# Patient Record
Sex: FEMALE | Race: WHITE | Employment: FULL TIME | URBAN - METROPOLITAN AREA
[De-identification: names, ages, dates, MRNs, and addresses within clinical notes are randomized per-mention and may not be internally consistent; named-entity substitution may affect disease eponyms.]

---

## 2017-05-03 PROBLEM — E78.5 DYSLIPIDEMIA: Status: ACTIVE | Noted: 2017-05-03

## 2017-10-28 ENCOUNTER — HOSPITAL ENCOUNTER (INPATIENT)
Age: 52
LOS: 3 days | Discharge: HOME | DRG: 176 | End: 2017-10-31
Attending: HOSPITALIST | Admitting: INTERNAL MEDICINE
Payer: COMMERCIAL

## 2017-10-28 PROBLEM — I26.99 ACUTE PULMONARY EMBOLISM (HCC): Status: ACTIVE | Noted: 2017-10-28

## 2017-10-28 PROBLEM — N80.9 ENDOMETRIOSIS: Chronic | Status: ACTIVE | Noted: 2017-10-28

## 2017-10-28 LAB
ALBUMIN SERPL-MCNC: 3.3 G/DL (ref 3.5–5)
ALBUMIN/GLOB SERPL: 0.8 {RATIO} (ref 1.2–3.5)
ALP SERPL-CCNC: 88 U/L (ref 50–136)
ALT SERPL-CCNC: 30 U/L (ref 12–65)
ANION GAP SERPL CALC-SCNC: 10 MMOL/L (ref 7–16)
APTT PPP: 29.4 SEC (ref 23.5–31.7)
ARTERIAL PATENCY WRIST A: POSITIVE
AST SERPL-CCNC: 22 U/L (ref 15–37)
BASE EXCESS BLDA CALC-SCNC: 2.5 MMOL/L (ref 0–3)
BASOPHILS # BLD: 0 K/UL (ref 0–0.2)
BASOPHILS NFR BLD: 0 % (ref 0–2)
BDY SITE: ABNORMAL
BILIRUB SERPL-MCNC: 0.3 MG/DL (ref 0.2–1.1)
BUN SERPL-MCNC: 9 MG/DL (ref 6–23)
CALCIUM SERPL-MCNC: 8.8 MG/DL (ref 8.3–10.4)
CHLORIDE SERPL-SCNC: 104 MMOL/L (ref 98–107)
CO2 SERPL-SCNC: 27 MMOL/L (ref 21–32)
COHGB MFR BLD: 0.9 % (ref 0.5–1.5)
CREAT SERPL-MCNC: 0.77 MG/DL (ref 0.6–1)
DIFFERENTIAL METHOD BLD: ABNORMAL
DO-HGB BLD-MCNC: 7 % (ref 0–5)
EOSINOPHIL # BLD: 0.1 K/UL (ref 0–0.8)
EOSINOPHIL NFR BLD: 1 % (ref 0.5–7.8)
ERYTHROCYTE [DISTWIDTH] IN BLOOD BY AUTOMATED COUNT: 13.1 % (ref 11.9–14.6)
GLOBULIN SER CALC-MCNC: 4.3 G/DL (ref 2.3–3.5)
GLUCOSE SERPL-MCNC: 124 MG/DL (ref 65–100)
HCO3 BLDA-SCNC: 24 MMOL/L (ref 22–26)
HCT VFR BLD AUTO: 38.4 % (ref 35.8–46.3)
HGB BLD-MCNC: 13.4 G/DL (ref 11.7–15.4)
HGB BLDMV-MCNC: 13.3 GM/DL (ref 11.7–15)
IMM GRANULOCYTES # BLD: 0 K/UL (ref 0–0.5)
IMM GRANULOCYTES NFR BLD: 0 % (ref 0–5)
INR PPP: 1 (ref 0.9–1.2)
LYMPHOCYTES # BLD: 3.5 K/UL (ref 0.5–4.6)
LYMPHOCYTES NFR BLD: 32 % (ref 13–44)
MCH RBC QN AUTO: 31 PG (ref 26.1–32.9)
MCHC RBC AUTO-ENTMCNC: 34.9 G/DL (ref 31.4–35)
MCV RBC AUTO: 88.9 FL (ref 79.6–97.8)
METHGB MFR BLD: 0 % (ref 0–1.5)
MONOCYTES # BLD: 0.8 K/UL (ref 0.1–1.3)
MONOCYTES NFR BLD: 7 % (ref 4–12)
NEUTS SEG # BLD: 6.4 K/UL (ref 1.7–8.2)
NEUTS SEG NFR BLD: 60 % (ref 43–78)
OXYHGB MFR BLDA: 92.4 % (ref 94–97)
PCO2 BLDA: 29 MMHG (ref 35–45)
PH BLDA: 7.54 [PH] (ref 7.35–7.45)
PLATELET # BLD AUTO: 292 K/UL (ref 150–450)
PMV BLD AUTO: 9.8 FL (ref 10.8–14.1)
PO2 BLDA: 59 MMHG (ref 80–105)
POTASSIUM SERPL-SCNC: 3.1 MMOL/L (ref 3.5–5.1)
PROT SERPL-MCNC: 7.6 G/DL (ref 6.3–8.2)
PROTHROMBIN TIME: 11 SEC (ref 9.6–12)
RBC # BLD AUTO: 4.32 M/UL (ref 4.05–5.25)
SAO2 % BLD: 93 % (ref 92–98.5)
SODIUM SERPL-SCNC: 141 MMOL/L (ref 136–145)
VENTILATION MODE VENT: ABNORMAL
WBC # BLD AUTO: 10.7 K/UL (ref 4.3–11.1)

## 2017-10-28 PROCEDURE — 74011250637 HC RX REV CODE- 250/637: Performed by: INTERNAL MEDICINE

## 2017-10-28 PROCEDURE — 36415 COLL VENOUS BLD VENIPUNCTURE: CPT | Performed by: INTERNAL MEDICINE

## 2017-10-28 PROCEDURE — 80053 COMPREHEN METABOLIC PANEL: CPT | Performed by: INTERNAL MEDICINE

## 2017-10-28 PROCEDURE — 74011636637 HC RX REV CODE- 636/637: Performed by: INTERNAL MEDICINE

## 2017-10-28 PROCEDURE — 85025 COMPLETE CBC W/AUTO DIFF WBC: CPT | Performed by: INTERNAL MEDICINE

## 2017-10-28 PROCEDURE — 82803 BLOOD GASES ANY COMBINATION: CPT

## 2017-10-28 PROCEDURE — 99218 HC RM OBSERVATION: CPT

## 2017-10-28 PROCEDURE — 74011000250 HC RX REV CODE- 250: Performed by: INTERNAL MEDICINE

## 2017-10-28 PROCEDURE — 36600 WITHDRAWAL OF ARTERIAL BLOOD: CPT

## 2017-10-28 PROCEDURE — 94640 AIRWAY INHALATION TREATMENT: CPT

## 2017-10-28 PROCEDURE — 85730 THROMBOPLASTIN TIME PARTIAL: CPT | Performed by: INTERNAL MEDICINE

## 2017-10-28 PROCEDURE — 85610 PROTHROMBIN TIME: CPT | Performed by: INTERNAL MEDICINE

## 2017-10-28 PROCEDURE — 93005 ELECTROCARDIOGRAM TRACING: CPT | Performed by: INTERNAL MEDICINE

## 2017-10-28 PROCEDURE — 65270000029 HC RM PRIVATE

## 2017-10-28 RX ORDER — SODIUM CHLORIDE 0.9 % (FLUSH) 0.9 %
5-10 SYRINGE (ML) INJECTION AS NEEDED
Status: DISCONTINUED | OUTPATIENT
Start: 2017-10-28 | End: 2017-10-31 | Stop reason: HOSPADM

## 2017-10-28 RX ORDER — PREDNISONE 20 MG/1
20 TABLET ORAL
Status: DISCONTINUED | OUTPATIENT
Start: 2017-10-29 | End: 2017-10-29

## 2017-10-28 RX ORDER — MONTELUKAST SODIUM 10 MG/1
10 TABLET ORAL
Status: DISCONTINUED | OUTPATIENT
Start: 2017-10-28 | End: 2017-10-31 | Stop reason: HOSPADM

## 2017-10-28 RX ORDER — PREDNISONE 10 MG/1
10 TABLET ORAL
Status: DISCONTINUED | OUTPATIENT
Start: 2017-10-29 | End: 2017-10-29

## 2017-10-28 RX ORDER — PREDNISONE 20 MG/1
20 TABLET ORAL
Status: DISPENSED | OUTPATIENT
Start: 2017-10-28 | End: 2017-10-29

## 2017-10-28 RX ORDER — BACLOFEN 10 MG/1
10 TABLET ORAL 3 TIMES DAILY
Status: DISCONTINUED | OUTPATIENT
Start: 2017-10-28 | End: 2017-10-31 | Stop reason: HOSPADM

## 2017-10-28 RX ORDER — ONDANSETRON 4 MG/1
8 TABLET, ORALLY DISINTEGRATING ORAL
Status: DISCONTINUED | OUTPATIENT
Start: 2017-10-28 | End: 2017-10-31 | Stop reason: HOSPADM

## 2017-10-28 RX ORDER — PREDNISONE 10 MG/1
10 TABLET ORAL
Status: COMPLETED | OUTPATIENT
Start: 2017-10-28 | End: 2017-10-28

## 2017-10-28 RX ORDER — BUDESONIDE 0.5 MG/2ML
500 INHALANT ORAL
Status: DISCONTINUED | OUTPATIENT
Start: 2017-10-28 | End: 2017-10-29

## 2017-10-28 RX ORDER — PREDNISONE 20 MG/1
20 TABLET ORAL
Status: COMPLETED | OUTPATIENT
Start: 2017-10-28 | End: 2017-10-28

## 2017-10-28 RX ORDER — FLUTICASONE PROPIONATE 50 MCG
2 SPRAY, SUSPENSION (ML) NASAL DAILY
Status: DISCONTINUED | OUTPATIENT
Start: 2017-10-29 | End: 2017-10-29 | Stop reason: SDUPTHER

## 2017-10-28 RX ORDER — ESCITALOPRAM OXALATE 10 MG/1
10 TABLET ORAL DAILY
Status: DISCONTINUED | OUTPATIENT
Start: 2017-10-29 | End: 2017-10-31 | Stop reason: HOSPADM

## 2017-10-28 RX ORDER — PREDNISONE 10 MG/1
10 TABLET ORAL
Status: DISCONTINUED | OUTPATIENT
Start: 2017-11-01 | End: 2017-10-29

## 2017-10-28 RX ORDER — NAPROXEN 250 MG/1
500 TABLET ORAL 2 TIMES DAILY WITH MEALS
Status: DISCONTINUED | OUTPATIENT
Start: 2017-10-28 | End: 2017-10-29

## 2017-10-28 RX ORDER — PREDNISONE 10 MG/1
10 TABLET ORAL
Status: DISCONTINUED | OUTPATIENT
Start: 2017-11-05 | End: 2017-10-29

## 2017-10-28 RX ORDER — ALBUTEROL SULFATE 90 UG/1
2 AEROSOL, METERED RESPIRATORY (INHALATION)
Status: DISCONTINUED | OUTPATIENT
Start: 2017-10-28 | End: 2017-10-29

## 2017-10-28 RX ORDER — SODIUM CHLORIDE 0.9 % (FLUSH) 0.9 %
5-10 SYRINGE (ML) INJECTION EVERY 8 HOURS
Status: DISCONTINUED | OUTPATIENT
Start: 2017-10-28 | End: 2017-10-31 | Stop reason: HOSPADM

## 2017-10-28 RX ORDER — LORATADINE 10 MG/1
10 TABLET ORAL DAILY
Status: DISCONTINUED | OUTPATIENT
Start: 2017-10-29 | End: 2017-10-29

## 2017-10-28 RX ADMIN — BACLOFEN 10 MG: 10 TABLET ORAL at 19:02

## 2017-10-28 RX ADMIN — Medication 5 ML: at 21:18

## 2017-10-28 RX ADMIN — PREDNISONE 10 MG: 10 TABLET ORAL at 19:03

## 2017-10-28 RX ADMIN — NAPROXEN 500 MG: 250 TABLET ORAL at 19:01

## 2017-10-28 RX ADMIN — BUDESONIDE 500 MCG: 0.5 INHALANT RESPIRATORY (INHALATION) at 19:55

## 2017-10-28 RX ADMIN — BACLOFEN 10 MG: 10 TABLET ORAL at 21:18

## 2017-10-28 RX ADMIN — PREDNISONE 20 MG: 20 TABLET ORAL at 19:15

## 2017-10-28 RX ADMIN — MONTELUKAST SODIUM 10 MG: 10 TABLET, FILM COATED ORAL at 21:18

## 2017-10-28 RX ADMIN — PREDNISONE 20 MG: 20 TABLET ORAL at 19:02

## 2017-10-28 RX ADMIN — APIXABAN 10 MG: 5 TABLET, FILM COATED ORAL at 19:02

## 2017-10-28 RX ADMIN — Medication 10 ML: at 19:04

## 2017-10-28 NOTE — PROGRESS NOTES
Reviewed notes of patient for spiritual concerns. Staff caring for patient. Family present.     Luis Craig,  Staff   C: [phone] /  [email or web address]

## 2017-10-28 NOTE — IP AVS SNAPSHOT
[facility] 
 
 
 [address] [facility] [address] [facility] 
[phone] Patient: Quinton Jones MRN: [id number] :1965 About your hospitalization You were admitted on:  2017 You last received care in the:  [facility] 8 MED SURG You were discharged on:  2017 Why you were hospitalized Your primary diagnosis was:  Not on File Your diagnoses also included:  Acute Pulmonary Embolism (Hcc), Dyslipidemia, Htn (Hypertension), Endometriosis Things You Need To Do (next 8 weeks) Follow up with Halley Javed MD  
[facility] to see to if appt time was available. Phone:  [phone] Where:  [address] [address] [address] Follow up with [facility] Message left they will call patient back with new patient appointment Phone:  [phone] Where:  [address] [address]  Extended Office Visit with Celena Vargas MD at  2:15 PM  
Where:  [address] [facility] ([facility])  New Patient with Halley Javed MD at  9:00 AM  
Where: [facility]) Discharge Orders None A check vasquez indicates which time of day the medication should be taken. My Medications STOP taking these medications   
 azilsartan med-chlorthalidone 40-12.5 mg Tab Commonly known as:  EDARBYCLOR  
   
  
 loratadine 10 mg tablet Commonly known as:  CLARITIN  
   
  
 MICROGESTIN FE  (28) 1 mg-20 mcg (21)/75 mg (7) Tab Generic drug:  norethindrone-ethinyl estradiol  
   
  
 naproxen 500 mg tablet Commonly known as:  NAPROSYN  
   
  
 norethindrone-ethinyl estradiol 1-20 mg-mcg Tab Commonly known as:  MICROGESTIN /20 TAKE these medications as instructed Instructions Each Dose to Equal  
 Morning Noon Evening Bedtime  
 albuterol 90 mcg/actuation inhaler Commonly known as:  PROVENTIL HFA, VENTOLIN HFA, PROAIR HFA Your next dose is: Take on as needed schedule Take 2 Puffs by inhalation every six (6) hours as needed for Wheezing. 2 Puff * apixaban 5 mg tablet Commonly known as:  Blaine Francesco Your next dose is: This evening Take 2 Tabs by mouth two (2) times a day for 8 doses. On 11/4/17 take 10 mg in the morning, switch to 5 mg in the evening 10 mg  
    
  
   
   
  
   
  
 * apixaban 5 mg tablet Commonly known as:  Blainedemar Maya Start taking on:  11/4/2017 Take 1 Tab by mouth two (2) times a day. Start using this dose on 11/4/17 at evening 5 mg  
    
   
   
   
  
 atenolol 50 mg tablet Commonly known as:  TENORMIN Your next dose is:  Tomorrow Morning Take 1 Tab by mouth daily. 50 mg  
    
  
   
   
   
  
 baclofen 10 mg tablet Commonly known as:  LIORESAL  
   
 1-2 po TID prn muscle spasm - causes sleepiness  
     
   
   
   
  
 escitalopram oxalate 10 mg tablet Commonly known as:  Ronnie Diamond Your next dose is:  Tomorrow Morning 
  
   
 take 1 tablet by mouth once daily  
     
  
   
   
   
  
 fexofenadine 180 mg tablet Commonly known as:  [place] Kingman Your next dose is:  Tomorrow Morning Take 1 Tab by mouth daily. 180 mg  
    
  
   
   
   
  
 fluticasone 50 mcg/actuation nasal spray Commonly known as:  Maryann Ferguson Your next dose is:  Tomorrow Morning 2 Sprays by Both Nostrils route daily. 2 Spray  
    
  
   
   
   
  
 losartan 25 mg tablet Commonly known as:  COZAAR Your next dose is:  Tomorrow Morning Take 1 Tab by mouth daily. 25 mg  
    
  
   
   
   
  
 montelukast 10 mg tablet Commonly known as:  SINGULAIR Your next dose is:  Tomorrow Morning 1 po qd for allergies and asthma ondansetron hcl 8 mg tablet Commonly known as:  ZOFRAN (AS HYDROCHLORIDE) Take 8 mg by mouth every eight (8) hours as needed for Nausea. raNITIdine 300 mg tablet Commonly known as:  ZANTAC Your next dose is: This evening 1 po qhs - bid * Notice: This list has 2 medication(s) that are the same as other medications prescribed for you. Read the directions carefully, and ask your doctor or other care provider to review them with you. Where to Get Your Medications Information on where to get these meds will be given to you by the nurse or doctor. ! Ask your nurse or doctor about these medications  
  apixaban 5 mg tablet  
 apixaban 5 mg tablet  
 atenolol 50 mg tablet  
 losartan 25 mg tablet Discharge Instructions Pulmonary Embolism: Care Instructions Your Care Instructions Pulmonary embolism is the sudden blockage of an artery in the lung. Blood clots in the deep veins of the leg or pelvis (deep vein thrombosis, or DVT) are the most common cause. These blood clots can travel to the lungs. Pulmonary embolism can be very serious. Because you have had one pulmonary embolism, you are at greater risk for having another one. But you can take steps to prevent another pulmonary embolism by following your doctor's instructions. You will probably take a prescription blood-thinning medicine to prevent blood clots. A blood thinner can stop a blood clot from growing larger and prevent new clots from forming. Follow-up care is a key part of your treatment and safety. Be sure to make and go to all appointments, and call your doctor if you are having problems. It's also a good idea to know your test results and keep a list of the medicines you take. How can you care for yourself at home? · Take your medicines exactly as prescribed.  Call your doctor if you think you are having a problem with your medicine. You will get more details on the specific medicines your doctor prescribes. · If you are taking a blood thinner, be sure you get instructions about how to take your medicine safely. Blood thinners can cause serious bleeding problems. Preventing future pulmonary embolisms · Exercise. Keep blood moving in your legs to keep clots from forming. If you are traveling by car, stop every hour or so. Get out and walk around for a few minutes. If you are traveling by bus, train, or plane, get out of your seat and walk up and down the aisles every hour or so. You also can do leg exercises while you are seated. Pump your feet up and down by pulling your toes up toward your knees then pointing them down. · Get up out of bed as soon as possible after an illness or surgery. · Do not smoke. If you need help quitting, talk to your doctor about stop-smoking programs and medicines. These can increase your chances of quitting for good. · Check with your doctor before taking hormone or birth control pills. These may increase your risk of blot clots. · Ask your doctor about wearing compression stockings to help prevent blood clots in your legs. There are different types of stockings, and they need to fit right. So your doctor will recommend what you need. When should you call for help? Call 911 anytime you think you may need emergency care. For example, call if: 
? · You have shortness of breath. ? · You have chest pain. ? · You passed out (lost consciousness). ? · You cough up blood. ?Call your doctor now or seek immediate medical care if: 
? · You have new or worsening pain or swelling in your leg. ? Watch closely for changes in your health, and be sure to contact your doctor if: 
? · You do not get better as expected. Where can you learn more? Go to http://jack-khloe.info/.  
Enter P396 in the search box to learn more about \"Pulmonary Embolism: Care Instructions. \" Current as of: March 20, 2017 Content Version: 11.4 © 2594-9608 [employer]. Care instructions adapted under license by [employer] (which disclaims liability or warranty for this information). If you have questions about a medical condition or this instruction, always ask your healthcare professional. [address] any warranty or liability for your use of this information. Apixaban (By mouth) Apixaban (a-PIX-a-ban) Treats and prevents blood clots. This medicine is a blood thinner. Brand Name(s): Eliquis There may be other brand names for this medicine. When This Medicine Should Not Be Used: This medicine is not right for everyone. Do not use it if you had an allergic reaction to apixaban or you have active bleeding. How to Use This Medicine:  
Tablet · Your doctor will tell you how much medicine to use. Do not use more than directed. · If you are not able to swallow the tablets whole, they may be crushed and mixed in water, 5% dextrose in water (D5W), apple juice, or applesauce. The crushed tablets may be mixed with 60 mL of water or D5W dose and given through a nasogastric tube (NGT). · This medicine should come with a Medication Guide. Ask your pharmacist for a copy if you do not have one. · Missed dose: Take a dose as soon as you remember. If it is almost time for your next dose, wait until then and take a regular dose. Do not take extra medicine to make up for a missed dose. · Store the medicine in a closed container at room temperature, away from heat, moisture, and direct light. Drugs and Foods to Avoid: Ask your doctor or pharmacist before using any other medicine, including over-the-counter medicines, vitamins, and herbal products. · Some medicines can affect how apixaban works. Tell your doctor if you are using any of the following: ¨ Carbamazepine, clarithromycin, itraconazole, ketoconazole, phenytoin, rifampin, ritonavir, Michel's wort ¨ Blood thinner (including clopidogrel, heparin, prasugrel, warfarin) ¨ Medicine to treat depression ¨ NSAID pain or arthritis medicine (including aspirin, celecoxib, diclofenac, ibuprofen, naproxen) Warnings While Using This Medicine: · Tell your doctor if you are pregnant or breastfeeding, or if you have kidney disease, liver disease, bleeding problems, or an artificial heart valve. · Do not stop using this medicine suddenly without asking your doctor. You might have a higher risk of stroke for a short time after you stop using this medicine. · This medicine increases your risk for bleeding that can become serious if not controlled. You may also bruise easily, and it may take longer than usual for bleeding to stop. · This medicine may increase your risk for blood clots in your spine or back if you undergo an epidural or spinal puncture. This could lead to paralysis. Tell your doctor if you ever had spine problems or back surgery. · Tell any doctor or dentist who treats you that you are using this medicine. With your doctor's supervision, you may need to stop using this medicine several days before you have surgery or medical tests. · Your doctor will do lab tests at regular visits to check on the effects of this medicine. Keep all appointments. · Keep all medicine out of the reach of children. Never share your medicine with anyone. Possible Side Effects While Using This Medicine:  
Call your doctor right away if you notice any of these side effects: · Allergic reaction: Itching or hives, swelling in your face or hands, swelling or tingling in your mouth or throat, chest tightness, trouble breathing · Change in how much or how often you urinate, red or pink urine · Chest pain, trouble breathing · Coughing up blood, vomiting blood or material that looks like coffee grounds · Numbness, tingling, or muscle weakness in your legs or feet · Red or black, tarry stools · Unusual bleeding, bruising, or weakness If you notice other side effects that you think are caused by this medicine, tell your doctor. Call your doctor for medical advice about side effects. You may report side effects to FDA at 6-348-FDA-0994 © 2017 [address] Information is for End User's use only and may not be sold, redistributed or otherwise used for commercial purposes. The above information is an  only. It is not intended as medical advice for individual conditions or treatments. Talk to your doctor, nurse or pharmacist before following any medical regimen to see if it is safe and effective for you. DISCHARGE SUMMARY from Nurse PATIENT INSTRUCTIONS: 
 
 
F-face looks uneven A-arms unable to move or move unevenly S-speech slurred or non-existent T-time-call 911 as soon as signs and symptoms begin-DO NOT go Back to bed or wait to see if you get better-TIME IS BRAIN. Warning Signs of HEART ATTACK Call 911 if you have these symptoms: 
? Chest discomfort. Most heart attacks involve discomfort in the center of the chest that lasts more than a few minutes, or that goes away and comes back. It can feel like uncomfortable pressure, squeezing, fullness, or pain. ? Discomfort in other areas of the upper body. Symptoms can include pain or discomfort in one or both arms, the back, neck, jaw, or stomach. ? Shortness of breath with or without chest discomfort. ? Other signs may include breaking out in a cold sweat, nausea, or lightheadedness. Don't wait more than five minutes to call [address]! Fast action can save your life. Calling 911 is almost always the fastest way to get lifesaving treatment.  Emergency Medical Services staff can begin treatment when they arrive  up to an hour sooner than if someone gets to the hospital by car. The discharge information has been reviewed with the patient. The patient verbalized understanding. Discharge medications reviewed with the patient and appropriate educational materials and side effects teaching were provided. ___________________________________________________________________________________________________________________________________ Introducing [facility]! Chrissy Richardson introduces "[employer]" patient portal. Now you can access parts of your medical record, email your doctor's office, and request medication refills online. 1. In your internet browser, go to [email or web address] 2. Click on the First Time User? Click Here link in the Sign In box. You will see the New Member Sign Up page. 3. Enter your "[employer]" Access Code exactly as it appears below. You will not need to use this code after youve completed the sign-up process. If you do not sign up before the expiration date, you must request a new code. · "[employer]" Access Code: [email or web address] Expires: 11/1/2017 11:58 AM 
 
4. Enter the last four digits of your Social Security Number (xxxx) and Date of Birth (mm/dd/yyyy) as indicated and click Submit. You will be taken to the next sign-up page. 5. Create a Cryptmint[employer] ID. This will be your "[employer]" login ID and cannot be changed, so think of one that is secure and easy to remember. 6. Create a [employer] password. You can change your password at any time. 7. Enter your Password Reset Question and Answer. This can be used at a later time if you forget your password. 8. Enter your e-mail address. You will receive e-mail notification when new information is available in [address]. 9. Click Sign Up. You can now view and download portions of your medical record. 10. Click the Download Summary menu link to download a portable copy of your medical information. If you have questions, please visit the Frequently Asked Questions section of the MyChart website. Remember, [employer] is NOT to be used for urgent needs. For medical emergencies, dial 911. Now available from your iPhone and Android! Unresulted Labs-Please follow up with your PCP about these lab tests Order Current Status CARDIOLIPIN AB, IGA In process FACTOR V LEIDEN MUTATION Preliminary result LUPUS ANTICOAG PANEL Preliminary result PROTHROMBIN [id number] MUTATION Preliminary result Providers Seen During Your Hospitalization Provider Specialty Primary office phone Guru Melgar MD Internal Medicine [phone] Immunizations Administered for This Admission Name Date Influenza Vaccine (Quad) PF  Deferred () Your Primary Care Physician (PCP) Primary Care Physician Office Phone Office Fax Pastor Lozano [phone] You are allergic to the following No active allergies Recent Documentation Height Weight Breastfeeding? BMI OB Status Smoking Status 1.626 m 90.2 kg No 34.12 kg/m2 Medically Induced Former Smoker Emergency Contacts Name Discharge Info Relation Home Work Mobile Galindo Fisher  Life Partner [7] [phone] Patient Belongings The following personal items are in your possession at time of discharge: 
  Dental Appliances: None  Visual Aid: None      Home Medications: Kept at bedside   Jewelry: None  Clothing: At bedside, Pajamas, Pants, Shirt    Other Valuables: Cell Phone  Personal Items Sent to Safe: none Please provide this summary of care documentation to your next provider. Signatures-by signing, you are acknowledging that this After Visit Summary has been reviewed with you and you have received a copy. Patient Signature:  ____________________________________________________________ Date:  ____________________________________________________________  
  
Althia Kras Provider Signature:  ____________________________________________________________ Date:  ____________________________________________________________

## 2017-10-28 NOTE — PROGRESS NOTES
Patient direct admit to room 823 from Emergency MD. Patient ambulated to room without difficulty or distress. Assessment completed and documented, see docflow. Patient A/Ox3, oriented to room, staff and surroundings. Patient resting quietly in bed. Family at bedside. NAD noted at present. Respirations even and non labored on RA. Verbalized understanding to call for needs. Call light within reach. Will continue to monitor.

## 2017-10-28 NOTE — H&P
HOSPITALIST H&P/CONSULT  NAME:  Megan Cooper   Age:  50 y.o.  :   1965   MRN:   [id number]  PCP: Tejinder Armendariz MD  Consulting MD:  Treatment Team: Attending Provider: Daisy Beckman MD  HPI:   Chief complaint :  ALCALA    53yo presented to the Hospital as direct admission with increased ALCALA. PT states that this began severas month prior to admissin . Dillan Ridgely Dyspnea was intermittent lasting minutes. Last week flew to [facility] hr flight  And returned home went bed then developed severe dyspnea with movement. Was seen at urgent care had  CTA with dx of B/L PE given lovenox    Complete ROS done and is as stated in HPI or otherwise negative  Past Medical History:   Diagnosis Date    Gallstones 12    Hypertension     controlled with meds      Past Surgical History:   Procedure Laterality Date    HX BREAST LUMPECTOMY      benign    HX GYN      laparoscopies for endometriosis- x 4    HX LAP CHOLECYSTECTOMY        Prior to Admission Medications   Prescriptions Last Dose Informant Patient Reported? Taking? MICROGESTIN FE , 28, 1-20 mg-mcg per tablet   Yes No   albuterol (PROVENTIL HFA, VENTOLIN HFA, PROAIR HFA) 90 mcg/actuation inhaler   No No   Sig: Take 2 Puffs by inhalation every six (6) hours as needed for Wheezing. azilsartan med-chlorthalidone (EDARBYCLOR) 40-12.5 mg tab   No No   Sig: Take 1 Tab by mouth daily. azithromycin (ZITHROMAX Z-ALEKSANDR) 250 mg tablet   No No   Sig: Take two tablets today then one tablet daily   baclofen (LIORESAL) 10 mg tablet   No No   Si-2 po TID prn muscle spasm - causes sleepiness   ciprofloxacin HCl (CIPRO) 500 mg tablet   No No   Sig: Take 1 Tab by mouth two (2) times a day. escitalopram oxalate (LEXAPRO) 10 mg tablet   No No   Sig: take 1 tablet by mouth once daily   fexofenadine (ALLEGRA) 180 mg tablet   No No   Sig: Take 1 Tab by mouth daily. fluticasone (FLONASE) 50 mcg/actuation nasal spray   No No   Si Sprays by Both Nostrils route daily. loratadine (CLARITIN) 10 mg tablet   No No   Sig: Take 1 Tab by mouth daily. For allergies   mometasone (ASMANEX TWISTHALER) 110 mcg (30 doses) aepb inhaler   No No   Sig: Take 1 Puff by inhalation daily. mometasone (ASMANEX TWISTHALER) 220 mcg (120 doses) aepb inhaler   No No   Sig: Take 1 Puff by inhalation every evening.   montelukast (SINGULAIR) 10 mg tablet   No No   Si po qd for allergies and asthma   norethindrone-ethinyl estradiol (MICROGESTIN ) 1-20 mg-mcg tab   Yes No   ondansetron hcl (ZOFRAN, AS HYDROCHLORIDE,) 8 mg tablet   No No   Sig: Take 8 mg by mouth every eight (8) hours as needed for Nausea. predniSONE (DELTASONE) 10 mg tablet   No No   Si po every day x4d then 1 po QD   raNITIdine (ZANTAC) 300 mg tablet   No No   Si po qhs - bid   ranitidine (ZANTAC) 150 mg tablet   No No   Sig: take 1 to 2 tablets by mouth twice a day      Facility-Administered Medications: None     No Known Allergies   Social History   Substance Use Topics    Smoking status: Former Smoker    Smokeless tobacco: Never Used    Alcohol use 1.0 oz/week     2 Glasses of wine per week      Family History   Problem Relation Age of Onset    Hypertension Mother     Diabetes Father     Other Maternal Grandfather      lymphoma    Other Paternal Grandfather      brain tumor      Objective:     Visit Vitals    BP (!) 149/92 (BP 1 Location: Left arm, BP Patient Position: At rest)    Pulse (!) 110    Temp 99.4 °F (37.4 °C)    Resp 18    SpO2 96%      Temp (24hrs), Av.4 °F (37.4 °C), Min:99.4 °F (37.4 °C), Max:99.4 °F (37.4 °C)    Oxygen Therapy  O2 Sat (%): 96 % (10/28/17 1600)  Physical Exam:  General:    Alert, cooperative, no distress, appears stated age. Head:   Normocephalic, without obvious abnormality, atraumatic. Nose:  Nares normal. No drainage or sinus tenderness. Lungs:   Clear to auscultation bilaterally. No Wheezing or Rhonchi. No rales.   Heart:   Regular rate and rhythm,  no murmur, rub or gallop. Abdomen:   Soft, non-tender. Not distended. Bowel sounds normal.   Extremities: No cyanosis. No edema. No clubbing  Skin:     Texture, turgor normal. No rashes or lesions. Not Jaundiced  Neurologic: Alert and oriented x 3, no focal deficits   Data Review:   No results found for this or any previous visit (from the past 24 hour(s)). Imaging /Procedures /Studies     Assessment and Plan:      Active Hospital Problems    Diagnosis Date Noted    Acute pulmonary embolism ([facility] [address] 10/28/2017    Endometriosis 10/28/2017    Dyslipidemia 05/03/2017    HTN (hypertension) 04/18/2013       PLAN  · eliquis 10mg po BID x 7 days then 5mg po BiD x 6 mos  · Radiology to read disc   · ABG,CMP CBC    · D/c birth control pills  · Consult gyn   · Resume home meds    Code Status: full    Anticipated discharge:     Signed By: Manish Velasco MD     October 28, 2017

## 2017-10-29 ENCOUNTER — APPOINTMENT (OUTPATIENT)
Dept: ULTRASOUND IMAGING | Age: 52
DRG: 176 | End: 2017-10-29
Attending: HOSPITALIST
Payer: COMMERCIAL

## 2017-10-29 LAB
ATRIAL RATE: 96 BPM
CALCULATED P AXIS, ECG09: 65 DEGREES
CALCULATED R AXIS, ECG10: -5 DEGREES
CALCULATED T AXIS, ECG11: 26 DEGREES
DIAGNOSIS, 93000: NORMAL
P-R INTERVAL, ECG05: 166 MS
Q-T INTERVAL, ECG07: 404 MS
QRS DURATION, ECG06: 86 MS
QTC CALCULATION (BEZET), ECG08: 510 MS
VENTRICULAR RATE, ECG03: 96 BPM

## 2017-10-29 PROCEDURE — 74011000250 HC RX REV CODE- 250: Performed by: INTERNAL MEDICINE

## 2017-10-29 PROCEDURE — 74011250637 HC RX REV CODE- 250/637: Performed by: HOSPITALIST

## 2017-10-29 PROCEDURE — 74011250637 HC RX REV CODE- 250/637: Performed by: INTERNAL MEDICINE

## 2017-10-29 PROCEDURE — 94640 AIRWAY INHALATION TREATMENT: CPT

## 2017-10-29 PROCEDURE — 65660000000 HC RM CCU STEPDOWN

## 2017-10-29 PROCEDURE — 74011636637 HC RX REV CODE- 636/637: Performed by: HOSPITALIST

## 2017-10-29 PROCEDURE — 74011636637 HC RX REV CODE- 636/637: Performed by: INTERNAL MEDICINE

## 2017-10-29 PROCEDURE — 36415 COLL VENOUS BLD VENIPUNCTURE: CPT | Performed by: HOSPITALIST

## 2017-10-29 PROCEDURE — 81240 F2 GENE: CPT | Performed by: HOSPITALIST

## 2017-10-29 PROCEDURE — 81241 F5 GENE: CPT | Performed by: HOSPITALIST

## 2017-10-29 PROCEDURE — 86147 CARDIOLIPIN ANTIBODY EA IG: CPT | Performed by: HOSPITALIST

## 2017-10-29 PROCEDURE — 85610 PROTHROMBIN TIME: CPT | Performed by: HOSPITALIST

## 2017-10-29 RX ORDER — PREDNISONE 20 MG/1
20 TABLET ORAL DAILY
Status: DISCONTINUED | OUTPATIENT
Start: 2017-10-29 | End: 2017-10-31 | Stop reason: HOSPADM

## 2017-10-29 RX ORDER — POTASSIUM CHLORIDE 1.5 G/1.77G
40 POWDER, FOR SOLUTION ORAL
Status: COMPLETED | OUTPATIENT
Start: 2017-10-29 | End: 2017-10-29

## 2017-10-29 RX ORDER — FLUTICASONE PROPIONATE 50 MCG
2 SPRAY, SUSPENSION (ML) NASAL DAILY
Status: DISCONTINUED | OUTPATIENT
Start: 2017-10-29 | End: 2017-10-31 | Stop reason: HOSPADM

## 2017-10-29 RX ADMIN — BUDESONIDE 500 MCG: 0.5 INHALANT RESPIRATORY (INHALATION) at 08:25

## 2017-10-29 RX ADMIN — APIXABAN 10 MG: 5 TABLET, FILM COATED ORAL at 08:55

## 2017-10-29 RX ADMIN — NAPROXEN 500 MG: 250 TABLET ORAL at 08:56

## 2017-10-29 RX ADMIN — Medication 10 ML: at 17:30

## 2017-10-29 RX ADMIN — MONTELUKAST SODIUM 10 MG: 10 TABLET, FILM COATED ORAL at 22:03

## 2017-10-29 RX ADMIN — APIXABAN 10 MG: 5 TABLET, FILM COATED ORAL at 22:03

## 2017-10-29 RX ADMIN — BACLOFEN 10 MG: 10 TABLET ORAL at 08:55

## 2017-10-29 RX ADMIN — BACLOFEN 10 MG: 10 TABLET ORAL at 22:03

## 2017-10-29 RX ADMIN — BACLOFEN 10 MG: 10 TABLET ORAL at 17:29

## 2017-10-29 RX ADMIN — PREDNISONE 20 MG: 20 TABLET ORAL at 08:56

## 2017-10-29 RX ADMIN — Medication 2 SPRAY: at 12:46

## 2017-10-29 RX ADMIN — Medication 5 ML: at 22:08

## 2017-10-29 RX ADMIN — POTASSIUM CHLORIDE 40 MEQ: 1.5 POWDER, FOR SOLUTION ORAL at 17:30

## 2017-10-29 RX ADMIN — PREDNISONE 20 MG: 20 TABLET ORAL at 17:29

## 2017-10-29 RX ADMIN — ESCITALOPRAM OXALATE 10 MG: 10 TABLET ORAL at 08:55

## 2017-10-29 RX ADMIN — Medication 5 ML: at 06:45

## 2017-10-29 RX ADMIN — PREDNISONE 10 MG: 10 TABLET ORAL at 12:44

## 2017-10-29 RX ADMIN — LORATADINE 10 MG: 10 TABLET ORAL at 08:55

## 2017-10-29 NOTE — PROGRESS NOTES
HOSPITALIST  NAME:  Quinton Jones   Age:  50 y.o.  :   1965   MRN:   [id number]  PCP: Celena Vargas MD  Consulting MD:  Treatment Team: Attending Provider: Khadijah Maciel MD; Utilization Review: Clarisa Avery; Consulting Provider: Halley Javed MD  SUBJ     Chief complaint :  ALCALA  53yo presented to the Hospital as direct admission with increased ALCALA. PT states that this began severas month prior to admissin . Rohit Chela Dyspnea was intermittent lasting minutes. Last week flew to [facility] hr flight  And returned home went bed then developed severe dyspnea with movement. Was seen at urgent care had  CTA with dx of B/L PE given lovenox    Today, occasional mild chest pain and cough    Past Medical History:   Diagnosis Date    Gallstones 12    Hypertension     controlled with meds      Past Surgical History:   Procedure Laterality Date    HX BREAST LUMPECTOMY      benign    HX GYN      laparoscopies for endometriosis- x 4    HX LAP CHOLECYSTECTOMY        Prior to Admission Medications   Prescriptions Last Dose Informant Patient Reported? Taking? MICROGESTIN FE , 28, 1-20 mg-mcg per tablet   Yes No   albuterol (PROVENTIL HFA, VENTOLIN HFA, PROAIR HFA) 90 mcg/actuation inhaler   No No   Sig: Take 2 Puffs by inhalation every six (6) hours as needed for Wheezing. azilsartan med-chlorthalidone (EDARBYCLOR) 40-12.5 mg tab   No No   Sig: Take 1 Tab by mouth daily. azithromycin (ZITHROMAX Z-ALEKSANDR) 250 mg tablet   No No   Sig: Take two tablets today then one tablet daily   baclofen (LIORESAL) 10 mg tablet   No No   Si-2 po TID prn muscle spasm - causes sleepiness   ciprofloxacin HCl (CIPRO) 500 mg tablet   No No   Sig: Take 1 Tab by mouth two (2) times a day. escitalopram oxalate (LEXAPRO) 10 mg tablet   No No   Sig: take 1 tablet by mouth once daily   fexofenadine (ALLEGRA) 180 mg tablet   No No   Sig: Take 1 Tab by mouth daily.    fluticasone (FLONASE) 50 mcg/actuation nasal spray   No No   Sig: 2 Sprays by Both Nostrils route daily. loratadine (CLARITIN) 10 mg tablet   No No   Sig: Take 1 Tab by mouth daily. For allergies   mometasone (ASMANEX TWISTHALER) 110 mcg (30 doses) aepb inhaler   No No   Sig: Take 1 Puff by inhalation daily. mometasone (ASMANEX TWISTHALER) 220 mcg (120 doses) aepb inhaler   No No   Sig: Take 1 Puff by inhalation every evening.   montelukast (SINGULAIR) 10 mg tablet   No No   Si po qd for allergies and asthma   norethindrone-ethinyl estradiol (MICROGESTIN ) 1-20 mg-mcg tab   Yes No   ondansetron hcl (ZOFRAN, AS HYDROCHLORIDE,) 8 mg tablet   No No   Sig: Take 8 mg by mouth every eight (8) hours as needed for Nausea. predniSONE (DELTASONE) 10 mg tablet   No No   Si po every day x4d then 1 po QD   raNITIdine (ZANTAC) 300 mg tablet   No No   Si po qhs - bid   ranitidine (ZANTAC) 150 mg tablet   No No   Sig: take 1 to 2 tablets by mouth twice a day      Facility-Administered Medications: None     No Known Allergies   Social History   Substance Use Topics    Smoking status: Former Smoker    Smokeless tobacco: Never Used    Alcohol use 1.0 oz/week     2 Glasses of wine per week      Family History   Problem Relation Age of Onset    Hypertension Mother     Diabetes Father     Other Maternal Grandfather      lymphoma    Other Paternal Grandfather      brain tumor      Objective:     Visit Vitals    /68    Pulse 60    Temp 98.8 °F (37.1 °C)    Resp 18    Ht 5' 4\" (1.626 m)    Wt 90.2 kg (198 lb 12.8 oz)    SpO2 92%    BMI 34.12 kg/m2      Temp (24hrs), Av.6 °F (37 °C), Min:97.9 °F (36.6 °C), Max:99.4 °F (37.4 °C)    Oxygen Therapy  O2 Sat (%): 92 % (10/29/17 1156)  O2 Device: Room air (10/29/17 0858)  Physical Exam:  General:    Alert, cooperative, no distress, appears stated age. Lungs:   Clear to auscultation bilaterally. No Wheezing or Rhonchi. No rales. Heart:   Regular rate and rhythm,  no murmur, rub or gallop.   Abdomen:   Soft, non-tender. Not distended. Bowel sounds normal.   Extremities: No cyanosis. No edema. No clubbing  Skin:     Texture, turgor normal. No rashes or lesions. Not Jaundiced  Neurologic: Intact    Data Review:   Recent Results (from the past 24 hour(s))   METABOLIC PANEL, COMPREHENSIVE    Collection Time: 10/28/17  6:03 PM   Result Value Ref Range    Sodium 141 136 - 145 mmol/L    Potassium 3.1 (L) 3.5 - 5.1 mmol/L    Chloride 104 98 - 107 mmol/L    CO2 27 21 - 32 mmol/L    Anion gap 10 7 - 16 mmol/L    Glucose 124 (H) 65 - 100 mg/dL    BUN 9 6 - 23 MG/DL    Creatinine 0.77 0.6 - 1.0 MG/DL    GFR est AA >60 >60 ml/min/1.73m2    GFR est non-AA >60 >60 ml/min/1.73m2    Calcium 8.8 8.3 - 10.4 MG/DL    Bilirubin, total 0.3 0.2 - 1.1 MG/DL    ALT (SGPT) 30 12 - 65 U/L    AST (SGOT) 22 15 - 37 U/L    Alk. phosphatase 88 50 - 136 U/L    Protein, total 7.6 6.3 - 8.2 g/dL    Albumin 3.3 (L) 3.5 - 5.0 g/dL    Globulin 4.3 (H) 2.3 - 3.5 g/dL    A-G Ratio 0.8 (L) 1.2 - 3.5     CBC WITH AUTOMATED DIFF    Collection Time: 10/28/17  6:03 PM   Result Value Ref Range    WBC 10.7 4.3 - 11.1 K/uL    RBC 4.32 4.05 - 5.25 M/uL    HGB 13.4 11.7 - 15.4 g/dL    HCT 38.4 35.8 - 46.3 %    MCV 88.9 79.6 - 97.8 FL    MCH 31.0 26.1 - 32.9 PG    MCHC 34.9 31.4 - 35.0 g/dL    RDW 13.1 11.9 - 14.6 %    PLATELET 316 243 - 081 K/uL    MPV 9.8 (L) 10.8 - 14.1 FL    DF AUTOMATED      NEUTROPHILS 60 43 - 78 %    LYMPHOCYTES 32 13 - 44 %    MONOCYTES 7 4.0 - 12.0 %    EOSINOPHILS 1 0.5 - 7.8 %    BASOPHILS 0 0.0 - 2.0 %    IMMATURE GRANULOCYTES 0 0.0 - 5.0 %    ABS. NEUTROPHILS 6.4 1.7 - 8.2 K/UL    ABS. LYMPHOCYTES 3.5 0.5 - 4.6 K/UL    ABS. MONOCYTES 0.8 0.1 - 1.3 K/UL    ABS. EOSINOPHILS 0.1 0.0 - 0.8 K/UL    ABS. BASOPHILS 0.0 0.0 - 0.2 K/UL    ABS. IMM.  GRANS. 0.0 0.0 - 0.5 K/UL   PROTHROMBIN TIME + INR    Collection Time: 10/28/17  6:03 PM   Result Value Ref Range    Prothrombin time 11.0 9.6 - 12.0 sec    INR 1.0 0.9 - 1.2     PTT    Collection Time: 10/28/17  6:03 PM   Result Value Ref Range    aPTT 29.4 23.5 - 31.7 SEC   BLOOD GAS, ARTERIAL    Collection Time: 10/28/17  7:30 PM   Result Value Ref Range    pH 7.54 (H) 7.35 - 7.45      PCO2 29 (L) 35 - 45 mmHg    PO2 59 (L) 80 - 105 mmHg    BICARBONATE 24 22 - 26 mmol/L    BASE EXCESS 2.5 0 - 3 mmol/L    TOTAL HEMOGLOBIN 13.3 11.7 - 15.0 GM/DL    O2 SAT 93 92 - 98.5 %    Arterial O2 Hgb 92.4 (L) 94 - 97 %    CARBOXYHEMOGLOBIN 0.9 0.5 - 1.5 %    METHEMOGLOBIN 0.0 0.0 - 1.5 %    DEOXYHEMOGLOBIN 7 (H) 0.0 - 5.0 %    SITE RR     ALLENS TEST POSITIVE      MODE RA    EKG, 12 LEAD, INITIAL    Collection Time: 10/29/17  7:11 AM   Result Value Ref Range    Ventricular Rate 96 BPM    Atrial Rate 96 BPM    P-R Interval 166 ms    QRS Duration 86 ms    Q-T Interval 404 ms    QTC Calculation (Bezet) 510 ms    Calculated P Axis 65 degrees    Calculated R Axis -5 degrees    Calculated T Axis 26 degrees    Diagnosis       Sinus rhythm with occasional Premature ventricular complexes  Minimal voltage criteria for LVH, may be normal variant  Nonspecific T wave abnormality  Prolonged QT  Abnormal ECG  When compared with ECG of 29-OCT-2017 07:10,  ECG not available  Confirmed by BRINA OLSON ([id number]) on 10/29/2017 10:26:19 AM       Imaging Linsey Schulz /Studies     Assessment and Plan: Active Hospital Problems    Diagnosis Date Noted    Acute pulmonary embolism ([facility] [address] 10/28/2017    Endometriosis 10/28/2017    Dyslipidemia 05/03/2017    HTN (hypertension) 04/18/2013       PLAN  · eliquis 10mg po BID x 7 days then 5mg po BiD x 6 mos  Risk factors: Pt has been on hormonal pills for endometriosis and has recent travel to [place] during which she had calves pain. Follow Telemetry, Echo, legs doppler. Follow genetic work up (pt has cousin w/ Brett Padilla)  She is on prednisone apparently for wheezing on admission, she has hx of seasonal allergies and asthmatic bronchitis.     Signed By: Dino Wall MD     October 29, 2017

## 2017-10-29 NOTE — PROGRESS NOTES
Patient alert and oriented X4, on RA, admits to SOB and pain when lying flat and occasionally with exertion, patient RR even and unlabored, no needs voiced @ this time, call light within reach.

## 2017-10-29 NOTE — PROGRESS NOTES
Problem: Interdisciplinary Rounds  Goal: Interdisciplinary Rounds  Outcome: Progressing Towards Goal  Interdisciplinary team rounds were held 10/29/2017 with the following team members:Nursing and the patient. Plan of care discussed. See clinical pathway and/or care plan for interventions and desired outcomes.

## 2017-10-29 NOTE — PROGRESS NOTES
AM assessment completed and documented, see docflow. Patient awake in bed, Patient A/Ox4. NAD noted. Respirations even and non labored on RA. Family at bedside, call light within reach. Will continue to monitor.

## 2017-10-29 NOTE — PROGRESS NOTES
Patient resting quietly in bed without c/o pain or discomfort, on RA, RR even and unlabored, no needs voiced, call light within reach.

## 2017-10-29 NOTE — PROGRESS NOTES
Patient resting in bed with eyes closed, RR even and unlabored, no needs voiced, call light within reach.

## 2017-10-29 NOTE — PROGRESS NOTES
Reassessment changed. Patient now on remote tele. Patient resting in bed, family at bedside. NAD noted. Will continue to monitor.

## 2017-10-29 NOTE — PROGRESS NOTES
Patient had uneventful day. Patient states \"I am feeling better\". Patient less ALCALA when ambulating to bathroom. Patient currently resting in bed, watching TV. NAD noted at present. BSR to be given to oncoming RN.

## 2017-10-30 ENCOUNTER — APPOINTMENT (OUTPATIENT)
Dept: ULTRASOUND IMAGING | Age: 52
DRG: 176 | End: 2017-10-30
Attending: HOSPITALIST
Payer: COMMERCIAL

## 2017-10-30 LAB
ALBUMIN SERPL-MCNC: 3.2 G/DL (ref 3.5–5)
ALBUMIN/GLOB SERPL: 0.7 {RATIO} (ref 1.2–3.5)
ALP SERPL-CCNC: 88 U/L (ref 50–136)
ALT SERPL-CCNC: 36 U/L (ref 12–65)
ANION GAP SERPL CALC-SCNC: 10 MMOL/L (ref 7–16)
AST SERPL-CCNC: 27 U/L (ref 15–37)
BILIRUB SERPL-MCNC: 0.3 MG/DL (ref 0.2–1.1)
BUN SERPL-MCNC: 14 MG/DL (ref 6–23)
CALCIUM SERPL-MCNC: 9.1 MG/DL (ref 8.3–10.4)
CHLORIDE SERPL-SCNC: 107 MMOL/L (ref 98–107)
CO2 SERPL-SCNC: 26 MMOL/L (ref 21–32)
CREAT SERPL-MCNC: 0.73 MG/DL (ref 0.6–1)
ERYTHROCYTE [DISTWIDTH] IN BLOOD BY AUTOMATED COUNT: 13.2 % (ref 11.9–14.6)
GLOBULIN SER CALC-MCNC: 4.4 G/DL (ref 2.3–3.5)
GLUCOSE BLD STRIP.AUTO-MCNC: 122 MG/DL (ref 65–100)
GLUCOSE BLD STRIP.AUTO-MCNC: 126 MG/DL (ref 65–100)
GLUCOSE BLD STRIP.AUTO-MCNC: 143 MG/DL (ref 65–100)
GLUCOSE SERPL-MCNC: 126 MG/DL (ref 65–100)
HCT VFR BLD AUTO: 40.7 % (ref 35.8–46.3)
HGB BLD-MCNC: 13.8 G/DL (ref 11.7–15.4)
MCH RBC QN AUTO: 30.7 PG (ref 26.1–32.9)
MCHC RBC AUTO-ENTMCNC: 33.9 G/DL (ref 31.4–35)
MCV RBC AUTO: 90.6 FL (ref 79.6–97.8)
PLATELET # BLD AUTO: 368 K/UL (ref 150–450)
PMV BLD AUTO: 9.9 FL (ref 10.8–14.1)
POTASSIUM SERPL-SCNC: 3.6 MMOL/L (ref 3.5–5.1)
PROT SERPL-MCNC: 7.6 G/DL (ref 6.3–8.2)
RBC # BLD AUTO: 4.49 M/UL (ref 4.05–5.25)
SODIUM SERPL-SCNC: 143 MMOL/L (ref 136–145)
WBC # BLD AUTO: 13.9 K/UL (ref 4.3–11.1)

## 2017-10-30 PROCEDURE — 74011250637 HC RX REV CODE- 250/637: Performed by: INTERNAL MEDICINE

## 2017-10-30 PROCEDURE — 80053 COMPREHEN METABOLIC PANEL: CPT | Performed by: INTERNAL MEDICINE

## 2017-10-30 PROCEDURE — 36415 COLL VENOUS BLD VENIPUNCTURE: CPT | Performed by: INTERNAL MEDICINE

## 2017-10-30 PROCEDURE — 85027 COMPLETE CBC AUTOMATED: CPT | Performed by: INTERNAL MEDICINE

## 2017-10-30 PROCEDURE — 82962 GLUCOSE BLOOD TEST: CPT

## 2017-10-30 PROCEDURE — 93970 EXTREMITY STUDY: CPT

## 2017-10-30 PROCEDURE — 74011250636 HC RX REV CODE- 250/636: Performed by: HOSPITALIST

## 2017-10-30 PROCEDURE — 74011000250 HC RX REV CODE- 250: Performed by: HOSPITALIST

## 2017-10-30 PROCEDURE — 74011636637 HC RX REV CODE- 636/637: Performed by: HOSPITALIST

## 2017-10-30 PROCEDURE — 65660000000 HC RM CCU STEPDOWN

## 2017-10-30 PROCEDURE — C8929 TTE W OR WO FOL WCON,DOPPLER: HCPCS

## 2017-10-30 RX ADMIN — Medication 5 ML: at 16:55

## 2017-10-30 RX ADMIN — BACLOFEN 10 MG: 10 TABLET ORAL at 09:54

## 2017-10-30 RX ADMIN — Medication 2 SPRAY: at 09:54

## 2017-10-30 RX ADMIN — BACLOFEN 10 MG: 10 TABLET ORAL at 22:09

## 2017-10-30 RX ADMIN — APIXABAN 10 MG: 5 TABLET, FILM COATED ORAL at 09:54

## 2017-10-30 RX ADMIN — APIXABAN 10 MG: 5 TABLET, FILM COATED ORAL at 22:09

## 2017-10-30 RX ADMIN — ESCITALOPRAM OXALATE 10 MG: 10 TABLET ORAL at 09:54

## 2017-10-30 RX ADMIN — PREDNISONE 20 MG: 20 TABLET ORAL at 09:54

## 2017-10-30 RX ADMIN — MONTELUKAST SODIUM 10 MG: 10 TABLET, FILM COATED ORAL at 22:09

## 2017-10-30 RX ADMIN — Medication 10 ML: at 06:02

## 2017-10-30 RX ADMIN — Medication 10 ML: at 22:10

## 2017-10-30 RX ADMIN — PERFLUTREN 1 ML: 6.52 INJECTION, SUSPENSION INTRAVENOUS at 09:00

## 2017-10-30 RX ADMIN — BACLOFEN 10 MG: 10 TABLET ORAL at 16:54

## 2017-10-30 NOTE — PROGRESS NOTES
Patient resting quietly throughout night, RR even and unlabored, no needs voiced, call light within reach.

## 2017-10-30 NOTE — PROGRESS NOTES
Patient sitting up in bed in room, alert and oriented X4, admits to SOB with exertion however states it has improved, patient without c/o pain or discomfort @ this time, call light within reach.

## 2017-10-30 NOTE — PROGRESS NOTES
Reviewed case with hospitalist--none cancer issuea reviewed in chart, will plan follow up visit in office to review clinical status, hx endometriosis and multiple procedures.     Discontinue ocps    Cont with pcp    anticoag per hospitalist

## 2017-10-30 NOTE — PROGRESS NOTES
Alert and oriented  Denies discomfort  Says still a little short of breath at rest  Room air sats in 90s

## 2017-10-30 NOTE — PROGRESS NOTES
HOSPITALIST  NAME:  Damon Chan   Age:  50 y.o.  :   1965   MRN:   [id number]  PCP: Jacque Prado MD  Consulting MD:  Treatment Team: Attending Provider: Josie Robin MD; Consulting Provider: Griffin Philip MD  SUBJ     Female patient admitted with bilateral PE. Stable. Still tachycardic. ECHO done this morning. Normal LVEF with no evidence of R ventricular strain. US of the lower extremities showed DVT of the R posterior tibial vein. Past Medical History:   Diagnosis Date    Gallstones 12    Hypertension     controlled with meds      Past Surgical History:   Procedure Laterality Date    HX BREAST LUMPECTOMY      benign    HX GYN      laparoscopies for endometriosis- x 4    HX LAP CHOLECYSTECTOMY        Prior to Admission Medications   Prescriptions Last Dose Informant Patient Reported? Taking? MICROGESTIN FE , , 1-20 mg-mcg per tablet   Yes No   albuterol (PROVENTIL HFA, VENTOLIN HFA, PROAIR HFA) 90 mcg/actuation inhaler   No No   Sig: Take 2 Puffs by inhalation every six (6) hours as needed for Wheezing. azilsartan med-chlorthalidone (EDARBYCLOR) 40-12.5 mg tab   No No   Sig: Take 1 Tab by mouth daily. azithromycin (ZITHROMAX Z-ALEKSANDR) 250 mg tablet   No No   Sig: Take two tablets today then one tablet daily   baclofen (LIORESAL) 10 mg tablet   No No   Si-2 po TID prn muscle spasm - causes sleepiness   ciprofloxacin HCl (CIPRO) 500 mg tablet   No No   Sig: Take 1 Tab by mouth two (2) times a day. escitalopram oxalate (LEXAPRO) 10 mg tablet   No No   Sig: take 1 tablet by mouth once daily   fexofenadine (ALLEGRA) 180 mg tablet   No No   Sig: Take 1 Tab by mouth daily. fluticasone (FLONASE) 50 mcg/actuation nasal spray   No No   Si Sprays by Both Nostrils route daily. loratadine (CLARITIN) 10 mg tablet   No No   Sig: Take 1 Tab by mouth daily.  For allergies   mometasone (ASMANEX TWISTHALER) 110 mcg (30 doses) aepb inhaler   No No   Sig: Take 1 Puff by inhalation daily. mometasone (ASMANEX TWISTHALER) 220 mcg (120 doses) aepb inhaler   No No   Sig: Take 1 Puff by inhalation every evening.   montelukast (SINGULAIR) 10 mg tablet   No No   Si po qd for allergies and asthma   norethindrone-ethinyl estradiol (MICROGESTIN ) 1-20 mg-mcg tab   Yes No   ondansetron hcl (ZOFRAN, AS HYDROCHLORIDE,) 8 mg tablet   No No   Sig: Take 8 mg by mouth every eight (8) hours as needed for Nausea. predniSONE (DELTASONE) 10 mg tablet   No No   Si po every day x4d then 1 po QD   raNITIdine (ZANTAC) 300 mg tablet   No No   Si po qhs - bid   ranitidine (ZANTAC) 150 mg tablet   No No   Sig: take 1 to 2 tablets by mouth twice a day      Facility-Administered Medications: None     No Known Allergies   Social History   Substance Use Topics    Smoking status: Former Smoker    Smokeless tobacco: Never Used    Alcohol use 1.0 oz/week     2 Glasses of wine per week      Family History   Problem Relation Age of Onset    Hypertension Mother     Diabetes Father     Other Maternal Grandfather      lymphoma    Other Paternal Grandfather      brain tumor      Objective:     Visit Vitals    /74    Pulse 77    Temp 98.9 °F (37.2 °C)    Resp 17    Ht 5' 4\" (1.626 m)    Wt 90.2 kg (198 lb 12.8 oz)    LMP 2017 (LMP Unknown)    SpO2 94%    Breastfeeding No    BMI 34.12 kg/m2      Temp (24hrs), Av.3 °F (36.8 °C), Min:97.7 °F (36.5 °C), Max:98.9 °F (37.2 °C)    Oxygen Therapy  O2 Sat (%): 94 % (10/30/17 1100)  O2 Device: Room air (10/29/17 0825)  Physical Exam:  General:    Alert, cooperative, no distress, appears stated age. Lungs:   Clear to auscultation bilaterally. No Wheezing or Rhonchi. No rales. Heart:   Regular rate and rhythm,  no murmur, rub or gallop. Abdomen:   Soft, non-tender. Not distended. Bowel sounds normal.   Extremities: No cyanosis. No edema. No clubbing  Skin:     Texture, turgor normal. No rashes or lesions.   Not Jaundiced  Neurologic: Intact    Data Review:   Recent Results (from the past 24 hour(s))   LUPUS ANTICOAG PANEL    Collection Time: 10/29/17  9:35 PM   Result Value Ref Range    Lupus Anticoag Panel PENDING    FACTOR V LEIDEN MUTATION    Collection Time: 10/29/17  9:35 PM   Result Value Ref Range    Factor V Leiden Mutation PENDING %   PROTHROMBIN K06520H MUTATION    Collection Time: 10/29/17  9:35 PM   Result Value Ref Range    Prothrombin T9089K Mutation PENDING    GLUCOSE, POC    Collection Time: 10/30/17  5:32 AM   Result Value Ref Range    Glucose (POC) 126 (H) 65 - 100 mg/dL   CBC W/O DIFF    Collection Time: 10/30/17 10:25 AM   Result Value Ref Range    WBC 13.9 (H) 4.3 - 11.1 K/uL    RBC 4.49 4.05 - 5.25 M/uL    HGB 13.8 11.7 - 15.4 g/dL    HCT 40.7 35.8 - 46.3 %    MCV 90.6 79.6 - 97.8 FL    MCH 30.7 26.1 - 32.9 PG    MCHC 33.9 31.4 - 35.0 g/dL    RDW 13.2 11.9 - 14.6 %    PLATELET 685 421 - 826 K/uL    MPV 9.9 (L) 10.8 - 88.4 FL   METABOLIC PANEL, COMPREHENSIVE    Collection Time: 10/30/17 10:25 AM   Result Value Ref Range    Sodium 143 136 - 145 mmol/L    Potassium 3.6 3.5 - 5.1 mmol/L    Chloride 107 98 - 107 mmol/L    CO2 26 21 - 32 mmol/L    Anion gap 10 7 - 16 mmol/L    Glucose 126 (H) 65 - 100 mg/dL    BUN 14 6 - 23 MG/DL    Creatinine 0.73 0.6 - 1.0 MG/DL    GFR est AA >60 >60 ml/min/1.73m2    GFR est non-AA >60 >60 ml/min/1.73m2    Calcium 9.1 8.3 - 10.4 MG/DL    Bilirubin, total 0.3 0.2 - 1.1 MG/DL    ALT (SGPT) 36 12 - 65 U/L    AST (SGOT) 27 15 - 37 U/L    Alk. phosphatase 88 50 - 136 U/L    Protein, total 7.6 6.3 - 8.2 g/dL    Albumin 3.2 (L) 3.5 - 5.0 g/dL    Globulin 4.4 (H) 2.3 - 3.5 g/dL    A-G Ratio 0.7 (L) 1.2 - 3.5     GLUCOSE, POC    Collection Time: 10/30/17 10:50 AM   Result Value Ref Range    Glucose (POC) 122 (H) 65 - 100 mg/dL     Imaging /Procedures /Studies     Assessment and Plan:      Active Hospital Problems    Diagnosis Date Noted    Acute pulmonary embolism ([facility] [address] 10/28/2017  Endometriosis 10/28/2017    Dyslipidemia 05/03/2017    HTN (hypertension) 04/18/2013       PLAN  · eliquis 10mg po BID x 7 days then 5mg po BiD x 6 mos  · Was using oral contraceptives for endometriosis. Case was discussed with oncology GYN today. Hormones stopped. She will be seen by GYN as an outpatient.    · ECHO with normal LVEF and no evidence of pulmonary hypertension   · Lower ext US showed DVT of the R lower extremity       Signed By: Christy Murillo MD     October 30, 2017

## 2017-10-31 VITALS
HEART RATE: 85 BPM | WEIGHT: 198.8 LBS | OXYGEN SATURATION: 95 % | BODY MASS INDEX: 33.94 KG/M2 | TEMPERATURE: 98 F | HEIGHT: 64 IN | RESPIRATION RATE: 20 BRPM | DIASTOLIC BLOOD PRESSURE: 74 MMHG | SYSTOLIC BLOOD PRESSURE: 127 MMHG

## 2017-10-31 LAB
ANION GAP SERPL CALC-SCNC: 10 MMOL/L (ref 7–16)
BUN SERPL-MCNC: 19 MG/DL (ref 6–23)
CALCIUM SERPL-MCNC: 8.7 MG/DL (ref 8.3–10.4)
CHLORIDE SERPL-SCNC: 106 MMOL/L (ref 98–107)
CO2 SERPL-SCNC: 25 MMOL/L (ref 21–32)
CREAT SERPL-MCNC: 0.79 MG/DL (ref 0.6–1)
ERYTHROCYTE [DISTWIDTH] IN BLOOD BY AUTOMATED COUNT: 13.3 % (ref 11.9–14.6)
GLUCOSE SERPL-MCNC: 71 MG/DL (ref 65–100)
HCT VFR BLD AUTO: 41.1 % (ref 35.8–46.3)
HGB BLD-MCNC: 13.6 G/DL (ref 11.7–15.4)
MCH RBC QN AUTO: 30.8 PG (ref 26.1–32.9)
MCHC RBC AUTO-ENTMCNC: 33.1 G/DL (ref 31.4–35)
MCV RBC AUTO: 93 FL (ref 79.6–97.8)
PLATELET # BLD AUTO: 361 K/UL (ref 150–450)
PMV BLD AUTO: 9.8 FL (ref 10.8–14.1)
POTASSIUM SERPL-SCNC: 3.7 MMOL/L (ref 3.5–5.1)
RBC # BLD AUTO: 4.42 M/UL (ref 4.05–5.25)
SODIUM SERPL-SCNC: 141 MMOL/L (ref 136–145)
WBC # BLD AUTO: 12.6 K/UL (ref 4.3–11.1)

## 2017-10-31 PROCEDURE — 74011636637 HC RX REV CODE- 636/637: Performed by: HOSPITALIST

## 2017-10-31 PROCEDURE — 36415 COLL VENOUS BLD VENIPUNCTURE: CPT | Performed by: INTERNAL MEDICINE

## 2017-10-31 PROCEDURE — 80048 BASIC METABOLIC PNL TOTAL CA: CPT | Performed by: INTERNAL MEDICINE

## 2017-10-31 PROCEDURE — 85027 COMPLETE CBC AUTOMATED: CPT | Performed by: INTERNAL MEDICINE

## 2017-10-31 PROCEDURE — 74011250637 HC RX REV CODE- 250/637: Performed by: INTERNAL MEDICINE

## 2017-10-31 RX ORDER — ATENOLOL 50 MG/1
50 TABLET ORAL DAILY
Qty: 30 TAB | Refills: 0 | Status: SHIPPED | OUTPATIENT
Start: 2017-10-31 | End: 2017-11-07 | Stop reason: SDUPTHER

## 2017-10-31 RX ORDER — LOSARTAN POTASSIUM 25 MG/1
25 TABLET ORAL DAILY
Qty: 30 TAB | Refills: 0 | Status: SHIPPED | OUTPATIENT
Start: 2017-10-31 | End: 2017-11-07 | Stop reason: SDUPTHER

## 2017-10-31 RX ADMIN — APIXABAN 10 MG: 5 TABLET, FILM COATED ORAL at 08:18

## 2017-10-31 RX ADMIN — Medication 2 SPRAY: at 08:20

## 2017-10-31 RX ADMIN — BACLOFEN 10 MG: 10 TABLET ORAL at 08:19

## 2017-10-31 RX ADMIN — PREDNISONE 20 MG: 20 TABLET ORAL at 08:19

## 2017-10-31 RX ADMIN — ESCITALOPRAM OXALATE 10 MG: 10 TABLET ORAL at 08:19

## 2017-10-31 RX ADMIN — Medication 10 ML: at 05:23

## 2017-10-31 NOTE — PROGRESS NOTES
Pt alert and oriented, resting in bed, family at bedside for support, breathing even and unlabored, denies pain or distress, call light within reach.

## 2017-10-31 NOTE — DISCHARGE SUMMARY
Discharge Summary     Patient: Romana Gleason MRN: [id number]  SSN: [id number]    YOB: 1965  Age: 50 y.o. Sex: female       Admit Date: 10/28/2017    Discharge Date: 10/31/2017      Admission Diagnoses: bi lateral PE  Acute pulmonary embolism [facility])    Discharge Diagnoses:   Problem List as of 10/31/2017  Date Reviewed: 8/1/2017          Codes Class Noted - Resolved    Acute pulmonary embolism ([address] ICD-10-CM: I26.99  ICD-9-CM: 415.19  10/28/2017 - Present        Endometriosis (Chronic) ICD-10-CM: N80.9  ICD-9-CM: 617.9  10/28/2017 - Present        Dyslipidemia ICD-10-CM: E78.5  ICD-9-CM: 272.4  5/3/2017 - Present        Allergic rhinitis ICD-10-CM: J30.9  ICD-9-CM: 477.9  3/31/2016 - Present        Esophagitis ICD-10-CM: K20.9  ICD-9-CM: 530.10  5/17/2014 - Present        Malaise and fatigue ICD-10-CM: R53.81, R53.83  ICD-9-CM: 780.79  5/17/2014 - Present        HTN (hypertension) ICD-10-CM: I10  ICD-9-CM: 401.9  4/18/2013 - Present               Discharge Condition: [place] Course: This is a 54yo female patient with a PMH of HTN and endometriosis who  presented to the Hospital as direct admission from an urgent care center where she presented with increased ALCALA. PT stated that this began several months ago but was exacerbated after she flew back and forth to [place]. She takes oral estrogens for a history of endometriosis. Was seen at urgent care had  CTA with dx of B/L PE given lovenox and transferred to our hospital. Her EKG showed frequent PVCs. A venous duplex of the lower extremities showed DVT of the R lower extremity. An ECHO showed Normal LVEF and No strain on the R ventricle. She kept having PVCs on the telemetry monitoring and was started on a BB ( atenolol). Oncology/GYN was consulted and recommended to stop the estrogens and Dr Rosa Alejandre will see the patient in the clinic once she is discharged. Genetic testing for any coagulation abnormality has been sent.  It will take several days for those results to be back. Patient will follow up with pulmonary and with her PCP. Her regular BP medication was switched to losartan 25 mg po daily + atenolol 50 mg po daily. She was discharged on oral anticoagulation with Eliquis. She was being treated with oral steroids for asthma/bronchitis before being admitted. Steroids stopped as it was considered SOB was due to PE. General:                    Alert, cooperative, no distress, appears stated age. Lungs:                       Clear to auscultation bilaterally. No Wheezing or Rhonchi. No rales. Heart:                                  Regular rate and rhythm,  no murmur, rub or gallop. Abdomen:                  Soft, non-tender. Not distended. Bowel sounds normal.   Extremities:               No cyanosis. No edema. No clubbing  Skin:                                    Texture, turgor normal. No rashes or lesions. Not Jaundiced  Neurologic:                Intact    Consults: None    Significant Diagnostic Studies: see hospital course     Disposition: home    Discharge Medications:   No current facility-administered medications for this encounter. Current Outpatient Prescriptions:     losartan (COZAAR) 25 mg tablet, Take 1 Tab by mouth daily. , Disp: 30 Tab, Rfl: 0    atenolol (TENORMIN) 50 mg tablet, Take 1 Tab by mouth daily. , Disp: 30 Tab, Rfl: 0    apixaban (ELIQUIS) 5 mg tablet, Take 2 Tabs by mouth two (2) times a day for 8 doses. On 11/4/17 take 10 mg in the morning, switch to 5 mg in the evening, Disp: 16 Tab, Rfl: 0    [START ON 11/4/2017] apixaban (ELIQUIS) 5 mg tablet, Take 1 Tab by mouth two (2) times a day.  Start using this dose on 11/4/17 at evening, Disp: 60 Tab, Rfl: 1    raNITIdine (ZANTAC) 300 mg tablet, 1 po qhs - bid, Disp: 180 Tab, Rfl: 3    escitalopram oxalate (LEXAPRO) 10 mg tablet, take 1 tablet by mouth once daily, Disp: 90 Tab, Rfl: 9    montelukast (SINGULAIR) 10 mg tablet, 1 po qd for allergies and asthma, Disp: 30 Tab, Rfl: 11    baclofen (LIORESAL) 10 mg tablet, 1-2 po TID prn muscle spasm - causes sleepiness, Disp: 100 Tab, Rfl: 5    fluticasone (FLONASE) 50 mcg/actuation nasal spray, 2 Sprays by Both Nostrils route daily. , Disp: 3 Bottle, Rfl: 11    fexofenadine (ALLEGRA) 180 mg tablet, Take 1 Tab by mouth daily. , Disp: 90 Tab, Rfl: 3    albuterol (PROVENTIL HFA, VENTOLIN HFA, PROAIR HFA) 90 mcg/actuation inhaler, Take 2 Puffs by inhalation every six (6) hours as needed for Wheezing., Disp: 3 Inhaler, Rfl: 3    ondansetron hcl (ZOFRAN, AS HYDROCHLORIDE,) 8 mg tablet, Take 8 mg by mouth every eight (8) hours as needed for Nausea., Disp: 35 Tab, Rfl: 5      Activity: Activity as tolerated  Diet: Regular Diet  Wound Care: None needed    Follow-up Appointments   Procedures    FOLLOW UP VISIT Appointment in: Two Weeks With dr Thais Julien -- my office should be calling with an appoinment time     With dr Thais Julien -- my office should be calling with an appoinment time     Standing Status:   Standing     Number of Occurrences:   1     Order Specific Question:   Appointment in     Answer: Two Weeks    FOLLOW UP VISIT Appointment in: Two Weeks Pulmonary ( case discussed with Dr Daniel Ovalles. Patient had PE. They will see pt as an outpatient)     Pulmonary ( case discussed with Dr Daniel Ovalles. Patient had PE. They will see pt as an outpatient)     Standing Status:   Standing     Number of Occurrences:   1     Standing Expiration Date:   11/1/2017     Order Specific Question:   Appointment in     Answer: Two Weeks    FOLLOW UP VISIT Appointment in: One Week PCP     PCP     Standing Status:   Standing     Number of Occurrences:   1     Standing Expiration Date:   11/1/2017     Order Specific Question:   Appointment in     Answer:    One Week    FOLLOW UP VISIT Appointment in: One Week Dr Humaira Kelley ( GYN/Oncology)     Dr Humaira Kelley ( GYN/Oncology)     Standing Status:   Standing     Number of Occurrences:   1     Standing Expiration Date:   11/1/2017     Order Specific Question:   Appointment in     Answer:    One Week       Signed By: Pati Velez MD     October 31, 2017

## 2017-10-31 NOTE — DISCHARGE INSTRUCTIONS
Pulmonary Embolism: Care Instructions  Your Care Instructions    Pulmonary embolism is the sudden blockage of an artery in the lung. Blood clots in the deep veins of the leg or pelvis (deep vein thrombosis, or DVT) are the most common cause. These blood clots can travel to the lungs. Pulmonary embolism can be very serious. Because you have had one pulmonary embolism, you are at greater risk for having another one. But you can take steps to prevent another pulmonary embolism by following your doctor's instructions. You will probably take a prescription blood-thinning medicine to prevent blood clots. A blood thinner can stop a blood clot from growing larger and prevent new clots from forming. Follow-up care is a key part of your treatment and safety. Be sure to make and go to all appointments, and call your doctor if you are having problems. It's also a good idea to know your test results and keep a list of the medicines you take. How can you care for yourself at home? · Take your medicines exactly as prescribed. Call your doctor if you think you are having a problem with your medicine. You will get more details on the specific medicines your doctor prescribes. · If you are taking a blood thinner, be sure you get instructions about how to take your medicine safely. Blood thinners can cause serious bleeding problems. Preventing future pulmonary embolisms  · Exercise. Keep blood moving in your legs to keep clots from forming. If you are traveling by car, stop every hour or so. Get out and walk around for a few minutes. If you are traveling by bus, train, or plane, get out of your seat and walk up and down the aisles every hour or so. You also can do leg exercises while you are seated. Pump your feet up and down by pulling your toes up toward your knees then pointing them down. · Get up out of bed as soon as possible after an illness or surgery. · Do not smoke.  If you need help quitting, talk to your doctor about stop-smoking programs and medicines. These can increase your chances of quitting for good. · Check with your doctor before taking hormone or birth control pills. These may increase your risk of blot clots. · Ask your doctor about wearing compression stockings to help prevent blood clots in your legs. There are different types of stockings, and they need to fit right. So your doctor will recommend what you need. When should you call for help? Call 911 anytime you think you may need emergency care. For example, call if:  ? · You have shortness of breath. ? · You have chest pain. ? · You passed out (lost consciousness). ? · You cough up blood. ?Call your doctor now or seek immediate medical care if:  ? · You have new or worsening pain or swelling in your leg. ? Watch closely for changes in your health, and be sure to contact your doctor if:  ? · You do not get better as expected. Where can you learn more? Go to http://jack-khloe.info/. Enter V873 in the search box to learn more about \"Pulmonary Embolism: Care Instructions. \"  Current as of: March 20, 2017  Content Version: 11.4  © 2202-6527 [employer]. Care instructions adapted under license by [employer] (which disclaims liability or warranty for this information). If you have questions about a medical condition or this instruction, always ask your healthcare professional. [address] any warranty or liability for your use of this information. Apixaban (By mouth)   Apixaban (a-PIX-a-ban)  Treats and prevents blood clots. This medicine is a blood thinner. Brand Name(s): Eliquis   There may be other brand names for this medicine. When This Medicine Should Not Be Used: This medicine is not right for everyone. Do not use it if you had an allergic reaction to apixaban or you have active bleeding.   How to Use This Medicine:   Tablet  · Your doctor will tell you how much medicine to use. Do not use more than directed. · If you are not able to swallow the tablets whole, they may be crushed and mixed in water, 5% dextrose in water (D5W), apple juice, or applesauce. The crushed tablets may be mixed with 60 mL of water or D5W dose and given through a nasogastric tube (NGT). · This medicine should come with a Medication Guide. Ask your pharmacist for a copy if you do not have one. · Missed dose: Take a dose as soon as you remember. If it is almost time for your next dose, wait until then and take a regular dose. Do not take extra medicine to make up for a missed dose. · Store the medicine in a closed container at room temperature, away from heat, moisture, and direct light. Drugs and Foods to Avoid:   Ask your doctor or pharmacist before using any other medicine, including over-the-counter medicines, vitamins, and herbal products. · Some medicines can affect how apixaban works. Tell your doctor if you are using any of the following:   ¨ Carbamazepine, clarithromycin, itraconazole, ketoconazole, phenytoin, rifampin, ritonavir, Michel's wort  ¨ Blood thinner (including clopidogrel, heparin, prasugrel, warfarin)  ¨ Medicine to treat depression  ¨ NSAID pain or arthritis medicine (including aspirin, celecoxib, diclofenac, ibuprofen, naproxen)  Warnings While Using This Medicine:   · Tell your doctor if you are pregnant or breastfeeding, or if you have kidney disease, liver disease, bleeding problems, or an artificial heart valve. · Do not stop using this medicine suddenly without asking your doctor. You might have a higher risk of stroke for a short time after you stop using this medicine. · This medicine increases your risk for bleeding that can become serious if not controlled. You may also bruise easily, and it may take longer than usual for bleeding to stop.   · This medicine may increase your risk for blood clots in your spine or back if you undergo an epidural or spinal puncture. This could lead to paralysis. Tell your doctor if you ever had spine problems or back surgery. · Tell any doctor or dentist who treats you that you are using this medicine. With your doctor's supervision, you may need to stop using this medicine several days before you have surgery or medical tests. · Your doctor will do lab tests at regular visits to check on the effects of this medicine. Keep all appointments. · Keep all medicine out of the reach of children. Never share your medicine with anyone. Possible Side Effects While Using This Medicine:   Call your doctor right away if you notice any of these side effects:  · Allergic reaction: Itching or hives, swelling in your face or hands, swelling or tingling in your mouth or throat, chest tightness, trouble breathing  · Change in how much or how often you urinate, red or pink urine  · Chest pain, trouble breathing  · Coughing up blood, vomiting blood or material that looks like coffee grounds  · Numbness, tingling, or muscle weakness in your legs or feet  · Red or black, tarry stools  · Unusual bleeding, bruising, or weakness  If you notice other side effects that you think are caused by this medicine, tell your doctor. Call your doctor for medical advice about side effects. You may report side effects to FDA at 5-676-FDA-3495  © 2017 [facility] Information is for End User's use only and may not be sold, redistributed or otherwise used for commercial purposes. The above information is an  only. It is not intended as medical advice for individual conditions or treatments. Talk to your doctor, nurse or pharmacist before following any medical regimen to see if it is safe and effective for you.     DISCHARGE SUMMARY from Nurse    PATIENT INSTRUCTIONS:    After general anesthesia or intravenous sedation, for 24 hours or while taking prescription Narcotics:  · Limit your activities  · Do not drive and operate hazardous machinery  · Do not make important personal or business decisions  · Do  not drink alcoholic beverages  · If you have not urinated within 8 hours after discharge, please contact your surgeon on call. Report the following to your surgeon:  · Excessive pain, swelling, redness or odor of or around the surgical area  · Temperature over 100.5  · Nausea and vomiting lasting longer than 4 hours or if unable to take medications  · Any signs of decreased circulation or nerve impairment to extremity: change in color, persistent  numbness, tingling, coldness or increase pain  · Any questions    What to do at Home:  Recommended activity: Activity as tolerated,     *  Please give a list of your current medications to your Primary Care Provider. *  Please update this list whenever your medications are discontinued, doses are      changed, or new medications (including over-the-counter products) are added. *  Please carry medication information at all times in case of emergency situations. These are general instructions for a healthy lifestyle:    No smoking/ No tobacco products/ Avoid exposure to second hand smoke  Surgeon General's Warning:  Quitting smoking now greatly reduces serious risk to your health. Obesity, smoking, and sedentary lifestyle greatly increases your risk for illness    A healthy diet, regular physical exercise & weight monitoring are important for maintaining a healthy lifestyle    You may be retaining fluid if you have a history of heart failure or if you experience any of the following symptoms:  Weight gain of 3 pounds or more overnight or 5 pounds in a week, increased swelling in our hands or feet or shortness of breath while lying flat in bed. Please call your doctor as soon as you notice any of these symptoms; do not wait until your next office visit.     Recognize signs and symptoms of STROKE:    F-face looks uneven    A-arms unable to move or move unevenly    S-speech slurred or non-existent    T-time-call 911 as soon as signs and symptoms begin-DO NOT go       Back to bed or wait to see if you get better-TIME IS BRAIN. Warning Signs of HEART ATTACK     Call 911 if you have these symptoms:   Chest discomfort. Most heart attacks involve discomfort in the center of the chest that lasts more than a few minutes, or that goes away and comes back. It can feel like uncomfortable pressure, squeezing, fullness, or pain.  Discomfort in other areas of the upper body. Symptoms can include pain or discomfort in one or both arms, the back, neck, jaw, or stomach.  Shortness of breath with or without chest discomfort.  Other signs may include breaking out in a cold sweat, nausea, or lightheadedness. Don't wait more than five minutes to call 911 - MINUTES MATTER! Fast action can save your life. Calling 911 is almost always the fastest way to get lifesaving treatment. Emergency Medical Services staff can begin treatment when they arrive -- up to an hour sooner than if someone gets to the hospital by car. The discharge information has been reviewed with the patient. The patient verbalized understanding. Discharge medications reviewed with the patient and appropriate educational materials and side effects teaching were provided.   ___________________________________________________________________________________________________________________________________

## 2017-11-01 LAB — CARDIOLIPIN IGA SER IA-ACNC: <9 APL U/ML (ref 0–11)

## 2017-11-02 LAB
FACTOR V LEIDEN MUTATION, XMF5LT: NORMAL %
LUPUS ANTICOAG PANEL, XMLUPT: NORMAL
PROTHROMBIN G2021A MUTATION, XMPTMT: NORMAL

## 2017-11-14 PROBLEM — R06.83 SNORING: Status: ACTIVE | Noted: 2017-11-14

## 2017-11-14 PROBLEM — I82.431 ACUTE DEEP VEIN THROMBOSIS (DVT) OF POPLITEAL VEIN OF RIGHT LOWER EXTREMITY (HCC): Chronic | Status: ACTIVE | Noted: 2017-11-14

## 2017-12-07 ENCOUNTER — HOSPITAL ENCOUNTER (OUTPATIENT)
Dept: SLEEP MEDICINE | Age: 52
Discharge: HOME | End: 2017-12-07
Payer: COMMERCIAL

## 2017-12-07 PROCEDURE — 95810 POLYSOM 6/> YRS 4/> PARAM: CPT

## 2018-02-08 ENCOUNTER — HOSPITAL ENCOUNTER (OUTPATIENT)
Dept: LAB | Age: 53
Discharge: HOME | End: 2018-02-08
Payer: COMMERCIAL

## 2018-02-08 DIAGNOSIS — N80.9 ENDOMETRIOSIS: ICD-10-CM

## 2018-02-08 LAB
ESTRADIOL SERPL-MCNC: 12.57 PG/ML
FSH SERPL-ACNC: 66 MIU/ML
LH SERPL-ACNC: 44.8 MIU/ML

## 2018-02-08 PROCEDURE — 83002 ASSAY OF GONADOTROPIN (LH): CPT | Performed by: OBSTETRICS & GYNECOLOGY

## 2018-02-08 PROCEDURE — 83001 ASSAY OF GONADOTROPIN (FSH): CPT | Performed by: OBSTETRICS & GYNECOLOGY

## 2018-02-08 PROCEDURE — 36415 COLL VENOUS BLD VENIPUNCTURE: CPT | Performed by: OBSTETRICS & GYNECOLOGY

## 2018-02-08 PROCEDURE — 82670 ASSAY OF TOTAL ESTRADIOL: CPT | Performed by: OBSTETRICS & GYNECOLOGY

## 2018-02-14 PROBLEM — G47.33 OSA (OBSTRUCTIVE SLEEP APNEA): Chronic | Status: ACTIVE | Noted: 2017-11-14

## 2018-09-06 ENCOUNTER — APPOINTMENT (RX ONLY)
Dept: URBAN - METROPOLITAN AREA CLINIC 349 | Facility: CLINIC | Age: 53
Setting detail: DERMATOLOGY
End: 2018-09-06

## 2018-09-06 DIAGNOSIS — Z80.8 FAMILY HISTORY OF MALIGNANT NEOPLASM OF OTHER ORGANS OR SYSTEMS: ICD-10-CM

## 2018-09-06 DIAGNOSIS — Z71.89 OTHER SPECIFIED COUNSELING: ICD-10-CM

## 2018-09-06 DIAGNOSIS — L82.1 OTHER SEBORRHEIC KERATOSIS: ICD-10-CM

## 2018-09-06 DIAGNOSIS — D22 MELANOCYTIC NEVI: ICD-10-CM

## 2018-09-06 PROBLEM — D22.5 MELANOCYTIC NEVI OF TRUNK: Status: ACTIVE | Noted: 2018-09-06

## 2018-09-06 PROBLEM — D22.72 MELANOCYTIC NEVI OF LEFT LOWER LIMB, INCLUDING HIP: Status: ACTIVE | Noted: 2018-09-06

## 2018-09-06 PROBLEM — D22.71 MELANOCYTIC NEVI OF RIGHT LOWER LIMB, INCLUDING HIP: Status: ACTIVE | Noted: 2018-09-06

## 2018-09-06 PROCEDURE — ? OTHER

## 2018-09-06 PROCEDURE — ? COUNSELING

## 2018-09-06 PROCEDURE — 99242 OFF/OP CONSLTJ NEW/EST SF 20: CPT

## 2018-09-06 ASSESSMENT — LOCATION SIMPLE DESCRIPTION DERM
LOCATION SIMPLE: RIGHT UPPER BACK
LOCATION SIMPLE: RIGHT THIGH
LOCATION SIMPLE: LEFT CALF
LOCATION SIMPLE: LEFT UPPER BACK
LOCATION SIMPLE: LEFT LOWER BACK
LOCATION SIMPLE: GROIN
LOCATION SIMPLE: LEFT THIGH
LOCATION SIMPLE: ABDOMEN

## 2018-09-06 ASSESSMENT — LOCATION DETAILED DESCRIPTION DERM
LOCATION DETAILED: RIGHT INFERIOR UPPER BACK
LOCATION DETAILED: RIGHT ANTERIOR DISTAL THIGH
LOCATION DETAILED: LEFT SUPRAPUBIC SKIN
LOCATION DETAILED: LEFT ANTERIOR PROXIMAL THIGH
LOCATION DETAILED: LEFT PROXIMAL CALF
LOCATION DETAILED: LEFT SUPERIOR MEDIAL LOWER BACK
LOCATION DETAILED: LEFT SUPERIOR UPPER BACK
LOCATION DETAILED: EPIGASTRIC SKIN

## 2018-09-06 ASSESSMENT — LOCATION ZONE DERM
LOCATION ZONE: LEG
LOCATION ZONE: TRUNK

## 2018-09-06 NOTE — PROCEDURE: OTHER
Other (Free Text): Grandfather
Note Text (......Xxx Chief Complaint.): This diagnosis correlates with the
Detail Level: Zone

## 2019-09-05 ENCOUNTER — APPOINTMENT (RX ONLY)
Dept: URBAN - METROPOLITAN AREA CLINIC 349 | Facility: CLINIC | Age: 54
Setting detail: DERMATOLOGY
End: 2019-09-05

## 2019-09-05 DIAGNOSIS — Z80.8 FAMILY HISTORY OF MALIGNANT NEOPLASM OF OTHER ORGANS OR SYSTEMS: ICD-10-CM

## 2019-09-05 DIAGNOSIS — L72.8 OTHER FOLLICULAR CYSTS OF THE SKIN AND SUBCUTANEOUS TISSUE: ICD-10-CM

## 2019-09-05 DIAGNOSIS — D22 MELANOCYTIC NEVI: ICD-10-CM

## 2019-09-05 DIAGNOSIS — Z71.89 OTHER SPECIFIED COUNSELING: ICD-10-CM

## 2019-09-05 DIAGNOSIS — L82.1 OTHER SEBORRHEIC KERATOSIS: ICD-10-CM

## 2019-09-05 DIAGNOSIS — L91.8 OTHER HYPERTROPHIC DISORDERS OF THE SKIN: ICD-10-CM

## 2019-09-05 PROBLEM — D22.71 MELANOCYTIC NEVI OF RIGHT LOWER LIMB, INCLUDING HIP: Status: ACTIVE | Noted: 2019-09-05

## 2019-09-05 PROBLEM — D22.72 MELANOCYTIC NEVI OF LEFT LOWER LIMB, INCLUDING HIP: Status: ACTIVE | Noted: 2019-09-05

## 2019-09-05 PROBLEM — D22.5 MELANOCYTIC NEVI OF TRUNK: Status: ACTIVE | Noted: 2019-09-05

## 2019-09-05 PROCEDURE — ? COUNSELING

## 2019-09-05 PROCEDURE — 99213 OFFICE O/P EST LOW 20 MIN: CPT | Mod: 25

## 2019-09-05 PROCEDURE — ? INTRALESIONAL KENALOG

## 2019-09-05 PROCEDURE — 11900 INJECT SKIN LESIONS </W 7: CPT

## 2019-09-05 PROCEDURE — ? OTHER

## 2019-09-05 PROCEDURE — ? PRESCRIPTION

## 2019-09-05 PROCEDURE — ? RECOMMENDATIONS

## 2019-09-05 RX ORDER — DOXYCYCLINE HYCLATE 100 MG/1
TABLET, COATED ORAL
Qty: 30 | Refills: 0 | Status: ERX | COMMUNITY
Start: 2019-09-05

## 2019-09-05 RX ADMIN — DOXYCYCLINE HYCLATE: 100 TABLET, COATED ORAL at 00:00

## 2019-09-05 ASSESSMENT — LOCATION DETAILED DESCRIPTION DERM
LOCATION DETAILED: UPPER STERNUM
LOCATION DETAILED: LEFT ANTERIOR PROXIMAL THIGH
LOCATION DETAILED: LEFT SUPRAPUBIC SKIN
LOCATION DETAILED: LEFT CENTRAL LATERAL NECK
LOCATION DETAILED: RIGHT LOWER CUTANEOUS LIP
LOCATION DETAILED: EPIGASTRIC SKIN
LOCATION DETAILED: LEFT PROXIMAL CALF
LOCATION DETAILED: RIGHT ANTERIOR DISTAL THIGH
LOCATION DETAILED: LEFT SUPERIOR MEDIAL LOWER BACK
LOCATION DETAILED: RIGHT INFERIOR UPPER BACK
LOCATION DETAILED: LEFT SUPERIOR UPPER BACK

## 2019-09-05 ASSESSMENT — LOCATION SIMPLE DESCRIPTION DERM
LOCATION SIMPLE: LEFT LOWER BACK
LOCATION SIMPLE: LEFT CALF
LOCATION SIMPLE: RIGHT LIP
LOCATION SIMPLE: NECK
LOCATION SIMPLE: CHEST
LOCATION SIMPLE: LEFT THIGH
LOCATION SIMPLE: RIGHT UPPER BACK
LOCATION SIMPLE: GROIN
LOCATION SIMPLE: LEFT UPPER BACK
LOCATION SIMPLE: RIGHT THIGH
LOCATION SIMPLE: ABDOMEN

## 2019-09-05 ASSESSMENT — LOCATION ZONE DERM
LOCATION ZONE: TRUNK
LOCATION ZONE: LIP
LOCATION ZONE: LEG
LOCATION ZONE: NECK

## 2019-09-05 NOTE — PROCEDURE: RECOMMENDATIONS
Detail Level: Zone
Recommendations (Free Text): Doxycyline 100 mg daily x 1 wk for flares\\nConsider Spironolactone in future if she continues to flare \\nPanoxyl wash daily

## 2019-09-05 NOTE — PROCEDURE: OTHER
Note Text (......Xxx Chief Complaint.): This diagnosis correlates with the
Detail Level: Zone
Other (Free Text): Grandfather

## 2019-09-05 NOTE — PROCEDURE: INTRALESIONAL KENALOG
Kenalog Preparation: Kenalog
Medical Necessity Clause: This procedure was medically necessary because the lesions that were treated were:
Total Volume Injected (Ccs- Only Use Numbers And Decimals): .2
X Size Of Lesion In Cm (Optional): 0
Include Z78.9 (Other Specified Conditions Influencing Health Status) As An Associated Diagnosis?: No
Consent: The risks of atrophy were reviewed with the patient.
Administered By (Optional): GUILLAUME Claire
Detail Level: Detailed
Concentration Of Solution Injected (Mg/Ml): 2.5

## 2020-06-14 ENCOUNTER — HOSPITAL ENCOUNTER (OUTPATIENT)
Age: 55
Setting detail: OBSERVATION
Discharge: HOME | End: 2020-06-15
Attending: INTERNAL MEDICINE | Admitting: INTERNAL MEDICINE
Payer: COMMERCIAL

## 2020-06-14 PROBLEM — R06.00 DYSPNEA: Status: ACTIVE | Noted: 2020-06-14

## 2020-06-14 LAB — TROPONIN-HIGH SENSITIVITY: 3 PG/ML (ref 0–14)

## 2020-06-14 PROCEDURE — 84484 ASSAY OF TROPONIN QUANT: CPT

## 2020-06-14 PROCEDURE — 99218 HC RM OBSERVATION: CPT

## 2020-06-14 PROCEDURE — 74011250637 HC RX REV CODE- 250/637: Performed by: NURSE PRACTITIONER

## 2020-06-14 PROCEDURE — 74011250636 HC RX REV CODE- 250/636: Performed by: NURSE PRACTITIONER

## 2020-06-14 PROCEDURE — 93005 ELECTROCARDIOGRAM TRACING: CPT | Performed by: NURSE PRACTITIONER

## 2020-06-14 PROCEDURE — 36591 DRAW BLOOD OFF VENOUS DEVICE: CPT

## 2020-06-14 RX ORDER — ESCITALOPRAM OXALATE 10 MG/1
10 TABLET ORAL DAILY
Status: DISCONTINUED | OUTPATIENT
Start: 2020-06-15 | End: 2020-06-15 | Stop reason: HOSPADM

## 2020-06-14 RX ORDER — MONTELUKAST SODIUM 10 MG/1
10 TABLET ORAL
Status: DISCONTINUED | OUTPATIENT
Start: 2020-06-14 | End: 2020-06-15 | Stop reason: HOSPADM

## 2020-06-14 RX ORDER — MAG HYDROX/ALUMINUM HYD/SIMETH 200-200-20
30 SUSPENSION, ORAL (FINAL DOSE FORM) ORAL
Status: DISCONTINUED | OUTPATIENT
Start: 2020-06-14 | End: 2020-06-15 | Stop reason: HOSPADM

## 2020-06-14 RX ORDER — IPRATROPIUM BROMIDE AND ALBUTEROL SULFATE 2.5; .5 MG/3ML; MG/3ML
3 SOLUTION RESPIRATORY (INHALATION)
Status: DISCONTINUED | OUTPATIENT
Start: 2020-06-14 | End: 2020-06-15 | Stop reason: HOSPADM

## 2020-06-14 RX ORDER — ACETAMINOPHEN 325 MG/1
650 TABLET ORAL
Status: DISCONTINUED | OUTPATIENT
Start: 2020-06-14 | End: 2020-06-15 | Stop reason: HOSPADM

## 2020-06-14 RX ORDER — BACLOFEN 10 MG/1
10 TABLET ORAL
Status: DISCONTINUED | OUTPATIENT
Start: 2020-06-14 | End: 2020-06-15 | Stop reason: HOSPADM

## 2020-06-14 RX ORDER — DIPHENHYDRAMINE HCL 25 MG
25 CAPSULE ORAL
Status: DISCONTINUED | OUTPATIENT
Start: 2020-06-14 | End: 2020-06-15 | Stop reason: HOSPADM

## 2020-06-14 RX ORDER — MORPHINE SULFATE 2 MG/ML
2 INJECTION, SOLUTION INTRAMUSCULAR; INTRAVENOUS
Status: DISCONTINUED | OUTPATIENT
Start: 2020-06-14 | End: 2020-06-15 | Stop reason: HOSPADM

## 2020-06-14 RX ORDER — SODIUM CHLORIDE 0.9 % (FLUSH) 0.9 %
5-40 SYRINGE (ML) INJECTION EVERY 8 HOURS
Status: DISCONTINUED | OUTPATIENT
Start: 2020-06-14 | End: 2020-06-15 | Stop reason: HOSPADM

## 2020-06-14 RX ORDER — LOSARTAN POTASSIUM 25 MG/1
25 TABLET ORAL DAILY
Status: DISCONTINUED | OUTPATIENT
Start: 2020-06-15 | End: 2020-06-15 | Stop reason: HOSPADM

## 2020-06-14 RX ORDER — SODIUM CHLORIDE 0.9 % (FLUSH) 0.9 %
5-40 SYRINGE (ML) INJECTION AS NEEDED
Status: DISCONTINUED | OUTPATIENT
Start: 2020-06-14 | End: 2020-06-15 | Stop reason: HOSPADM

## 2020-06-14 RX ORDER — ONDANSETRON 2 MG/ML
4 INJECTION INTRAMUSCULAR; INTRAVENOUS
Status: DISCONTINUED | OUTPATIENT
Start: 2020-06-14 | End: 2020-06-15 | Stop reason: HOSPADM

## 2020-06-14 RX ORDER — ATENOLOL 50 MG/1
50 TABLET ORAL DAILY
Status: DISCONTINUED | OUTPATIENT
Start: 2020-06-15 | End: 2020-06-15 | Stop reason: HOSPADM

## 2020-06-14 RX ORDER — NALOXONE HYDROCHLORIDE 0.4 MG/ML
0.4 INJECTION, SOLUTION INTRAMUSCULAR; INTRAVENOUS; SUBCUTANEOUS AS NEEDED
Status: DISCONTINUED | OUTPATIENT
Start: 2020-06-14 | End: 2020-06-15 | Stop reason: HOSPADM

## 2020-06-14 RX ORDER — FLUTICASONE PROPIONATE 50 MCG
2 SPRAY, SUSPENSION (ML) NASAL DAILY
Status: DISCONTINUED | OUTPATIENT
Start: 2020-06-15 | End: 2020-06-15 | Stop reason: HOSPADM

## 2020-06-14 RX ORDER — SODIUM CHLORIDE 9 MG/ML
75 INJECTION, SOLUTION INTRAVENOUS CONTINUOUS
Status: DISCONTINUED | OUTPATIENT
Start: 2020-06-14 | End: 2020-06-15 | Stop reason: HOSPADM

## 2020-06-14 RX ORDER — NITROGLYCERIN 0.4 MG/1
0.4 TABLET SUBLINGUAL
Status: DISCONTINUED | OUTPATIENT
Start: 2020-06-14 | End: 2020-06-15 | Stop reason: HOSPADM

## 2020-06-14 RX ORDER — GUAIFENESIN 100 MG/5ML
81 LIQUID (ML) ORAL DAILY
Status: DISCONTINUED | OUTPATIENT
Start: 2020-06-15 | End: 2020-06-15 | Stop reason: HOSPADM

## 2020-06-14 RX ADMIN — SODIUM CHLORIDE 75 ML/HR: 9 INJECTION, SOLUTION INTRAVENOUS at 21:25

## 2020-06-14 RX ADMIN — MONTELUKAST 10 MG: 10 TABLET, FILM COATED ORAL at 21:25

## 2020-06-14 RX ADMIN — Medication 10 ML: at 21:25

## 2020-06-15 VITALS
TEMPERATURE: 98.5 F | RESPIRATION RATE: 20 BRPM | BODY MASS INDEX: 32.97 KG/M2 | HEIGHT: 64 IN | HEART RATE: 76 BPM | OXYGEN SATURATION: 97 % | WEIGHT: 193.1 LBS | DIASTOLIC BLOOD PRESSURE: 68 MMHG | SYSTOLIC BLOOD PRESSURE: 123 MMHG

## 2020-06-15 LAB
25(OH)D3 SERPL-MCNC: 30.5 NG/ML (ref 30–100)
ANION GAP SERPL CALC-SCNC: 7 MMOL/L (ref 7–16)
ATRIAL RATE: 68 BPM
BUN SERPL-MCNC: 13 MG/DL (ref 6–23)
CALCIUM SERPL-MCNC: 8.1 MG/DL (ref 8.3–10.4)
CALCULATED P AXIS, ECG09: 66 DEGREES
CALCULATED R AXIS, ECG10: 14 DEGREES
CALCULATED T AXIS, ECG11: 58 DEGREES
CHLORIDE SERPL-SCNC: 113 MMOL/L (ref 98–107)
CHOLEST SERPL-MCNC: 150 MG/DL
CO2 SERPL-SCNC: 24 MMOL/L (ref 21–32)
CREAT SERPL-MCNC: 0.71 MG/DL (ref 0.6–1)
DIAGNOSIS, 93000: NORMAL
ERYTHROCYTE [DISTWIDTH] IN BLOOD BY AUTOMATED COUNT: 12.6 % (ref 11.9–14.6)
EST. AVERAGE GLUCOSE BLD GHB EST-MCNC: 103 MG/DL
GLUCOSE SERPL-MCNC: 88 MG/DL (ref 65–100)
HBA1C MFR BLD: 5.2 % (ref 4.8–6)
HCT VFR BLD AUTO: 37.2 % (ref 35.8–46.3)
HDLC SERPL-MCNC: 50 MG/DL (ref 40–60)
HDLC SERPL: 3 {RATIO}
HGB BLD-MCNC: 12.1 G/DL (ref 11.7–15.4)
LDLC SERPL CALC-MCNC: 85 MG/DL
LIPID PROFILE,FLP: NORMAL
MCH RBC QN AUTO: 30.6 PG (ref 26.1–32.9)
MCHC RBC AUTO-ENTMCNC: 32.5 G/DL (ref 31.4–35)
MCV RBC AUTO: 93.9 FL (ref 79.6–97.8)
NRBC # BLD: 0 K/UL (ref 0–0.2)
P-R INTERVAL, ECG05: 178 MS
PLATELET # BLD AUTO: 243 K/UL (ref 150–450)
PMV BLD AUTO: 10.2 FL (ref 9.4–12.3)
POTASSIUM SERPL-SCNC: 3.7 MMOL/L (ref 3.5–5.1)
Q-T INTERVAL, ECG07: 482 MS
QRS DURATION, ECG06: 84 MS
QTC CALCULATION (BEZET), ECG08: 512 MS
RBC # BLD AUTO: 3.96 M/UL (ref 4.05–5.2)
SODIUM SERPL-SCNC: 144 MMOL/L (ref 136–145)
TRIGL SERPL-MCNC: 75 MG/DL (ref 35–150)
TROPONIN-HIGH SENSITIVITY: 3.7 PG/ML (ref 0–14)
TSH SERPL DL<=0.005 MIU/L-ACNC: 1.21 UIU/ML (ref 0.36–3.74)
VENTRICULAR RATE, ECG03: 68 BPM
VLDLC SERPL CALC-MCNC: 15 MG/DL (ref 6–23)
WBC # BLD AUTO: 7.9 K/UL (ref 4.3–11.1)

## 2020-06-15 PROCEDURE — 74011250636 HC RX REV CODE- 250/636: Performed by: INTERNAL MEDICINE

## 2020-06-15 PROCEDURE — 80048 BASIC METABOLIC PNL TOTAL CA: CPT

## 2020-06-15 PROCEDURE — 77030016699 HC CATH ANGI DX INFN1 CARD -A

## 2020-06-15 PROCEDURE — 77030015766

## 2020-06-15 PROCEDURE — 77030029997 HC DEV COM RDL R BND TELE -B

## 2020-06-15 PROCEDURE — 80061 LIPID PANEL: CPT

## 2020-06-15 PROCEDURE — C1894 INTRO/SHEATH, NON-LASER: HCPCS

## 2020-06-15 PROCEDURE — C1769 GUIDE WIRE: HCPCS

## 2020-06-15 PROCEDURE — 83036 HEMOGLOBIN GLYCOSYLATED A1C: CPT

## 2020-06-15 PROCEDURE — 74011250637 HC RX REV CODE- 250/637: Performed by: NURSE PRACTITIONER

## 2020-06-15 PROCEDURE — 84484 ASSAY OF TROPONIN QUANT: CPT

## 2020-06-15 PROCEDURE — 85027 COMPLETE CBC AUTOMATED: CPT

## 2020-06-15 PROCEDURE — 99218 HC RM OBSERVATION: CPT

## 2020-06-15 PROCEDURE — 82306 VITAMIN D 25 HYDROXY: CPT

## 2020-06-15 PROCEDURE — 96374 THER/PROPH/DIAG INJ IV PUSH: CPT

## 2020-06-15 PROCEDURE — 74011250636 HC RX REV CODE- 250/636: Performed by: NURSE PRACTITIONER

## 2020-06-15 PROCEDURE — 84443 ASSAY THYROID STIM HORMONE: CPT

## 2020-06-15 PROCEDURE — 99152 MOD SED SAME PHYS/QHP 5/>YRS: CPT

## 2020-06-15 PROCEDURE — 36415 COLL VENOUS BLD VENIPUNCTURE: CPT

## 2020-06-15 PROCEDURE — 74011000250 HC RX REV CODE- 250: Performed by: INTERNAL MEDICINE

## 2020-06-15 PROCEDURE — 74011636320 HC RX REV CODE- 636/320: Performed by: INTERNAL MEDICINE

## 2020-06-15 PROCEDURE — 93458 L HRT ARTERY/VENTRICLE ANGIO: CPT

## 2020-06-15 RX ORDER — FENTANYL CITRATE 50 UG/ML
25-50 INJECTION, SOLUTION INTRAMUSCULAR; INTRAVENOUS
Status: DISCONTINUED | OUTPATIENT
Start: 2020-06-15 | End: 2020-06-15 | Stop reason: HOSPADM

## 2020-06-15 RX ORDER — HEPARIN SODIUM 10000 [USP'U]/ML
1000-10000 INJECTION, SOLUTION INTRAVENOUS; SUBCUTANEOUS
Status: DISCONTINUED | OUTPATIENT
Start: 2020-06-15 | End: 2020-06-15 | Stop reason: HOSPADM

## 2020-06-15 RX ORDER — LIDOCAINE HYDROCHLORIDE 10 MG/ML
10-30 INJECTION, SOLUTION EPIDURAL; INFILTRATION; INTRACAUDAL; PERINEURAL ONCE
Status: COMPLETED | OUTPATIENT
Start: 2020-06-15 | End: 2020-06-15

## 2020-06-15 RX ORDER — MIDAZOLAM HYDROCHLORIDE 1 MG/ML
.5-2 INJECTION, SOLUTION INTRAMUSCULAR; INTRAVENOUS
Status: DISCONTINUED | OUTPATIENT
Start: 2020-06-15 | End: 2020-06-15 | Stop reason: HOSPADM

## 2020-06-15 RX ORDER — HEPARIN SODIUM 200 [USP'U]/100ML
3 INJECTION, SOLUTION INTRAVENOUS CONTINUOUS
Status: DISCONTINUED | OUTPATIENT
Start: 2020-06-15 | End: 2020-06-15 | Stop reason: HOSPADM

## 2020-06-15 RX ADMIN — ESCITALOPRAM OXALATE 10 MG: 10 TABLET ORAL at 08:04

## 2020-06-15 RX ADMIN — NITROGLYCERIN 1 INCH: 20 OINTMENT TOPICAL at 12:40

## 2020-06-15 RX ADMIN — Medication 10 ML: at 06:37

## 2020-06-15 RX ADMIN — LIDOCAINE HYDROCHLORIDE 4 ML: 10 INJECTION, SOLUTION EPIDURAL; INFILTRATION; INTRACAUDAL; PERINEURAL at 15:53

## 2020-06-15 RX ADMIN — NITROGLYCERIN 1 INCH: 20 OINTMENT TOPICAL at 06:37

## 2020-06-15 RX ADMIN — HEPARIN SODIUM 3 UNITS/HR: 200 INJECTION, SOLUTION INTRAVENOUS at 15:53

## 2020-06-15 RX ADMIN — ATENOLOL 50 MG: 50 TABLET ORAL at 08:04

## 2020-06-15 RX ADMIN — MIDAZOLAM 2 MG: 1 INJECTION INTRAMUSCULAR; INTRAVENOUS at 15:53

## 2020-06-15 RX ADMIN — NITROGLYCERIN 1 INCH: 20 OINTMENT TOPICAL at 00:12

## 2020-06-15 RX ADMIN — FENTANYL CITRATE 25 MCG: 50 INJECTION, SOLUTION INTRAMUSCULAR; INTRAVENOUS at 15:53

## 2020-06-15 RX ADMIN — IOPAMIDOL 100 ML: 755 INJECTION, SOLUTION INTRAVENOUS at 16:09

## 2020-06-15 RX ADMIN — ACETAMINOPHEN 650 MG: 325 TABLET, FILM COATED ORAL at 12:43

## 2020-06-15 RX ADMIN — ASPIRIN 81 MG 81 MG: 81 TABLET ORAL at 08:04

## 2020-06-15 RX ADMIN — HEPARIN SODIUM 2 ML: 10000 INJECTION, SOLUTION INTRAVENOUS; SUBCUTANEOUS at 16:00

## 2020-06-15 RX ADMIN — SODIUM CHLORIDE 75 ML/HR: 9 INJECTION, SOLUTION INTRAVENOUS at 10:06

## 2020-06-15 RX ADMIN — LOSARTAN POTASSIUM 25 MG: 25 TABLET, FILM COATED ORAL at 08:04

## 2020-06-15 RX ADMIN — ONDANSETRON 4 MG: 2 INJECTION INTRAMUSCULAR; INTRAVENOUS at 12:43

## 2020-06-15 NOTE — PROCEDURES
Brief Cardiac Procedure Note    Patient: Quita Rosario MRN: [id number]  SSN: [id number]    YOB: 1965  Age: 50 y.o. Sex: female      Date of Procedure: 6/15/2020     Pre-procedure Diagnosis: Atypical Angina    Post-procedure Diagnosis: Non-cardiac Chest Pain    Reason for Procedure: ACS < or = 24 Hours    Procedure: Left Heart Catheterization    Brief Description of Procedure: lhc via right radial, tr    Performed By: Rahul Landeros MD     Assistants: none    Anesthesia: Moderate Sedation    Estimated Blood Loss: Less than 10 mL      Specimens: None    Implants: None    Findings: mild cad- nl lvef    Complications: None    Recommendations: Continue medical therapy.     Signed By: Rahul Landeros MD     Julieta 15, 2020

## 2020-06-15 NOTE — PROGRESS NOTES
Discharge instructions reviewed with patient. Prescriptions given for no new meds and med info sheets provided for all new medications. Opportunity for questions provided. patient voiced understanding of all discharge instructions.

## 2020-06-15 NOTE — PROGRESS NOTES
Radial compression band removed at 1820 after slowly reducing air from 12 cc to zero as per hospital protocol. No bleeding or hematoma noted. 2 x 2 gauze with tegaderm placed over puncture site. The affected extremity is warm and dry to the touch. Frequent vital signs printed and placed on bedside chart. Patient instructed to call if any bleeding noted on gauze. Patient verbalized understanding the nursing instructions.

## 2020-06-15 NOTE — PROGRESS NOTES
Care Management Interventions  PCP Verified by CM: Yes  Last Visit to PCP: 05/14/20  Mode of Transport at Discharge:  Other (see comment)([facility] [phone]  )  Transition of Care Consult (CM Consult): Discharge Planning  Discharge Durable Medical Equipment: No  Occupational Therapy Consult: No  Current Support Network: Lives with Spouse, Own Home  Confirm Follow Up Transport: Family  Discharge Location  Discharge Placement: Home

## 2020-06-15 NOTE — PROGRESS NOTES
TRANSFER - IN REPORT:    Verbal report received from [id number] on [employer] being received from [id number] [place] [address] for routine progression of care. Report consisted of patients Situation, Background, Assessment and Recommendations(SBAR). Information from the following report(s) SBAR and Recent Results was reviewed. Opportunity for questions and clarification was provided. Assessment completed upon patients arrival to unit and care assumed. Patient received to room 302. Patient connected to monitor and assessment completed. Plan of care reviewed. Patient oriented to room and call light. Patient aware to use call light to communicate any chest pain or needs. Admission skin assessment completed with second RN and reveals the following:   Heels and sacrum C/D/I   Scattered scars throughout. Slight BLE swelling   Overall skin is clear and intact.

## 2020-06-15 NOTE — DISCHARGE INSTRUCTIONS
Patient Education        Angiogram: What to Expect at Home  Your Recovery  An angiogram is an X-ray test that uses dye and a camera to take pictures of the blood flow in an artery or a vein. The doctor inserted a thin, flexible tube (catheter) into a blood vessel in your groin. In some cases, the catheter is placed in a blood vessel in the arm. An angiogram is done for many reasons. For example, you may have an angiogram to find the source of bleeding, such as an ulcer. Or it may be done to look for blocked blood vessels in your lungs. After an angiogram, your groin or arm may have a bruise and feel sore for a day or two. You can do light activities around the house but nothing strenuous for several days. Your doctor may give you specific instructions on when you can do your normal activities again, such as driving and going back to work. This care sheet gives you a general idea about how long it will take for you to recover. But each person recovers at a different pace. Follow the steps below to feel better as quickly as possible. How can you care for yourself at home? Activity  · Do not do strenuous exercise and do not lift, pull, or push anything heavy until your doctor says it is okay. This may be for a day or two. You can walk around the house and do light activity, such as cooking. · If the catheter was placed in your groin, try not to walk up stairs for the first couple of days. · If the catheter was placed in your arm near your wrist, do not bend your wrist deeply for the first couple of days. Be careful using your hand to get into and out of a chair or bed. · If your doctor recommends it, get more exercise. Walking is a good choice. Bit by bit, increase the amount you walk every day. Try for at least 30 minutes on most days of the week. Diet  · Drink plenty of fluids to help your body flush out the dye.  If you have kidney, heart, or liver disease and have to limit fluids, talk with your doctor before you increase the amount of fluids you drink. · You can eat your normal diet. If your stomach is upset, try bland, low-fat foods like plain rice, broiled chicken, toast, and yogurt. Medicines  · Be safe with medicines. Read and follow all instructions on the label. ? If the doctor gave you a prescription medicine for pain, take it as prescribed. ? If you are not taking a prescription pain medicine, ask your doctor if you can take an over-the-counter medicine. · If you take aspirin or some other blood thinner, ask your doctor if and when to start taking it again. Make sure that you understand exactly what your doctor wants you to do. · Your doctor will tell you if and when you can restart your medicines. He or she will also give you instructions about taking any new medicines. Care of the catheter site  · You will have a dressing over the cut (incision). A dressing helps the incision heal and protects it. Your doctor will tell you how to take care of this. · Put ice or a cold pack on the area for 10 to 20 minutes at a time to help with soreness or swelling. Put a thin cloth between the ice and your skin. · You may shower 24 to 48 hours after the procedure, if your doctor okays it. Pat the incision dry. · Do not soak the catheter site until it is healed. Don't take a bath for 1 week, or until your doctor tells you it is okay. · Watch for bleeding from the site. A small amount of blood (up to the size of a quarter) on the bandage can be normal.  · If you are bleeding, lie down and press on the area for 15 minutes to try to make it stop. If the bleeding does not stop, call your doctor or seek immediate medical care. Follow-up care is a key part of your treatment and safety. Be sure to make and go to all appointments, and call your doctor if you are having problems. It's also a good idea to know your test results and keep a list of the medicines you take. When should you call for help?    [id number] anytime you think you may need emergency care. For example, call if:  · You passed out (lost consciousness). · You have severe trouble breathing. · You have sudden chest pain and shortness of breath, or you cough up blood. Call your doctor now or seek immediate medical care if:  · You are bleeding from the area where the catheter was put in your artery. · You have a fast-growing, painful lump at the catheter site. · You have signs of infection, such as:  ? Increased pain, swelling, warmth, or redness. ? Red streaks leading from the incision. ? Pus draining from the incision. ? A fever. Watch closely for any changes in your health, and be sure to contact your doctor if:  · You don't get better as expected. Where can you learn more? Go to http://jack-khloe.info/  Enter [id number] in the search box to learn more about \"Angiogram: What to Expect at Home. \"  Current as of: December 9, 2019               Content Version: 12.5  © 8120-4346 [employer]. Care instructions adapted under license by [employer] (which disclaims liability or warranty for this information). If you have questions about a medical condition or this instruction, always ask your healthcare professional. [address] any warranty or liability for your use of this information.

## 2020-06-15 NOTE — ROUTINE PROCESS
Verbal bedside report given to Denny Whatley oncoming RN. Patient's situation, background, assessment and recommendations provided. Opportunity for questions provided. Oncoming RN assumed care of patient. Saline IV drip verified at bedside with oncoming RN.

## 2020-06-15 NOTE — PROGRESS NOTES
TRANSFER - OUT REPORT:    Verbal report given to tele RN(name) on [employer]  being transferred to tele(unit) for routine progression of care       Report consisted of patients Situation, Background, Assessment and   Recommendations(SBAR). Information from the following report(s) SBAR, Kardex, Procedure Summary and MAR was reviewed with the receiving nurse. Lines:   Peripheral IV 06/14/20 Left;Posterior Hand (Active)   Site Assessment Clean, dry, & intact 6/15/2020 12:37 PM   Phlebitis Assessment 0 6/15/2020 12:37 PM   Infiltration Assessment 0 6/15/2020 12:37 PM   Dressing Status Clean, dry, & intact 6/15/2020 12:37 PM   Dressing Type Transparent 6/15/2020 12:37 PM   Hub Color/Line Status Patent; Infusing 6/15/2020  4:41 AM   Alcohol Cap Used No 6/15/2020  4:41 AM       Peripheral IV 06/14/20 Anterior;Proximal;Right Forearm (Active)   Site Assessment Clean, dry, & intact 6/15/2020 12:37 PM   Phlebitis Assessment 0 6/15/2020 12:37 PM   Infiltration Assessment 0 6/15/2020 12:37 PM   Dressing Status Clean, dry, & intact 6/15/2020 12:37 PM   Dressing Type Transparent 6/15/2020 12:37 PM   Hub Color/Line Status Patent 6/15/2020  4:41 AM   Alcohol Cap Used No 6/15/2020  4:41 AM        Opportunity for questions and clarification was provided.       Patient transported with:   [facility] with Dr. Darcie Riggs  Right Radial Access  No intervention  Versed 2mg  Fentanyl 25mcg  TR Band in place @ 9043 with 12ml in band

## 2020-06-15 NOTE — DISCHARGE SUMMARY
[facility] Cardiology Discharge Summary     Patient ID:  Maegan Trinh  [id number]  51 y.o.  1965    Admit date: 6/14/2020    Discharge date:  06/15/20     Admitting Physician: Antonieta Buenrostro MD     Discharge Physician: Dragan Olguin NP/Dr. Jens Barone    Admission Diagnoses: Dyspnea [R06.00]    Discharge Diagnoses:   Patient Active Problem List    Diagnosis Date Noted    Dyspnea 06/14/2020    Acute deep vein thrombosis (DVT) of popliteal vein of right lower extremity ([facility] [address] 11/14/2017    LACHELLE (obstructive sleep apnea) 11/14/2017    Acute pulmonary embolism ([address] 10/28/2017    Endometriosis 10/28/2017    Dyslipidemia 05/03/2017    Allergic rhinitis 03/31/2016    Esophagitis 05/17/2014    Malaise and fatigue 05/17/2014    HTN (hypertension) 04/18/2013       Cardiology Procedures this admission:  Diagnostic left heart catheterization  Consults: None    Hospital Course: Patient was seen at  with  complaints of chest pain and was transferred to [facility] for further care. Patient underwent cardiac catheterization by Dr. Jens Barone on 6/15/2020. Patient was found to have mild CAD. Patient tolerated the procedure well and was taken to the telemetry floor for recovery. The afternoon post procedure, the  patient was up feeling well without any complaints of chest pain or shortness of breath. Patient's right radial cath site was clean, dry and intact without hematoma or bruit. Patient's labs were WNL. Patient was seen and examined by Dr. Jens Barone and determined stable and ready for discharge. Patient was instructed on the importance of medication compliance and follow up appointments. The patient will follow up with [facility] [address] Cardiology Dr. Jono Aldridge on  7/15/2020 at 200 am in [facility]. DISPOSITION: The patient is being discharged home in stable condition on a low saturated fat, low cholesterol and low salt diet.  The patient is instructed to advance activities as tolerated to the limit of fatigue or shortness of breath. The patient is instructed to avoid all heavy lifting for 5 days. The patient is instructed to watch the cath site for bleeding/oozing; if seen, the patient is instructed to apply firm pressure with a clean cloth and call [facility] Cardiology at [phone]. The patient is instructed to watch for signs of infection which include: increasing area of redness, fever/hot to touch or purulent drainage at the catheterization site. The patient is instructed not to soak in a bathtub for 7-10 days, but is cleared to shower. The patient is instructed to call the office or return to the ER for immediate evaluation for any shortness of breath or chest pain not relieved by NTG. Discharge Exam:   Visit Vitals  /72   Pulse 62   Temp 98.5 °F (36.9 °C)   Resp 16   Ht 5' 4\" (1.626 m)   Wt 87.6 kg (193 lb 1.6 oz)   SpO2 98%   BMI 33.15 kg/m²     Patient has been seen by Dr. Nesha Link: see his progress note for exam details.     Recent Results (from the past 24 hour(s))   TROPONIN-HIGH SENSITIVITY    Collection Time: 06/14/20  8:31 PM   Result Value Ref Range    Troponin-High Sensitivity 3.0 0 - 14 pg/mL   TROPONIN-HIGH SENSITIVITY    Collection Time: 06/15/20  1:00 AM   Result Value Ref Range    Troponin-High Sensitivity 3.7 0 - 14 pg/mL   LIPID PANEL    Collection Time: 06/15/20  3:40 AM   Result Value Ref Range    LIPID PROFILE          Cholesterol, total 150 <200 MG/DL    Triglyceride 75 35 - 150 MG/DL    HDL Cholesterol 50 40 - 60 MG/DL    LDL, calculated 85 <100 MG/DL    VLDL, calculated 15 6.0 - 23.0 MG/DL    CHOL/HDL Ratio 3.0     METABOLIC PANEL, BASIC    Collection Time: 06/15/20  3:40 AM   Result Value Ref Range    Sodium 144 136 - 145 mmol/L    Potassium 3.7 3.5 - 5.1 mmol/L    Chloride 113 (H) 98 - 107 mmol/L    CO2 24 21 - 32 mmol/L    Anion gap 7 7 - 16 mmol/L    Glucose 88 65 - 100 mg/dL    BUN 13 6 - 23 MG/DL    Creatinine 0.71 0.6 - 1.0 MG/DL    GFR est AA >60 >60 ml/min/1.73m2    GFR est non-AA >60 >60 ml/min/1.73m2    Calcium 8.1 (L) 8.3 - 10.4 MG/DL   HEMOGLOBIN A1C WITH EAG    Collection Time: 06/15/20  3:40 AM   Result Value Ref Range    Hemoglobin A1c 5.2 4.8 - 6.0 %    Est. average glucose 103 mg/dL   TSH 3RD GENERATION    Collection Time: 06/15/20  3:40 AM   Result Value Ref Range    TSH 1.210 0.358 - 3.740 uIU/mL   CBC W/O DIFF    Collection Time: 06/15/20  3:40 AM   Result Value Ref Range    WBC 7.9 4.3 - 11.1 K/uL    RBC 3.96 (L) 4.05 - 5.2 M/uL    HGB 12.1 11.7 - 15.4 g/dL    HCT 37.2 35.8 - 46.3 %    MCV 93.9 79.6 - 97.8 FL    MCH 30.6 26.1 - 32.9 PG    MCHC 32.5 31.4 - 35.0 g/dL    RDW 12.6 11.9 - 14.6 %    PLATELET 505 857 - 729 K/uL    MPV 10.2 9.4 - 12.3 FL    ABSOLUTE NRBC 0.00 0.0 - 0.2 K/uL   VITAMIN D, 25 HYDROXY    Collection Time: 06/15/20  3:40 AM   Result Value Ref Range    Vitamin D 25-Hydroxy 30.5 30.0 - 100.0 ng/mL   EKG, 12 LEAD, INITIAL    Collection Time: 06/15/20  7:47 AM   Result Value Ref Range    Ventricular Rate 68 BPM    Atrial Rate 68 BPM    P-R Interval 178 ms    QRS Duration 84 ms    Q-T Interval 482 ms    QTC Calculation (Bezet) 512 ms    Calculated P Axis 66 degrees    Calculated R Axis 14 degrees    Calculated T Axis 58 degrees    Diagnosis       Sinus rhythm with frequent Premature ventricular complexes  Nonspecific ST abnormality  Prolonged QT  Abnormal ECG  When compared with ECG of 29-OCT-2017 07:11,  T wave inversion no longer evident in Anterior leads  Confirmed by Gracia Velasco MD (), JACQUE CATALAN ([id number]) on 6/15/2020 9:09:07 AM           Patient Instructions:   Current Discharge Medication List      CONTINUE these medications which have NOT CHANGED    Details   baclofen (LIORESAL) 10 mg tablet 1-2 po TID prn muscle spasm - causes sleepiness  Qty: 100 Tab, Refills: 5    Associated Diagnoses: Right shoulder pain, unspecified chronicity      albuterol (PROVENTIL HFA, VENTOLIN HFA, PROAIR HFA) 90 mcg/actuation inhaler Take 1 Puff by inhalation every six (6) hours as needed for Wheezing. Qty: 1 Inhaler, Refills: 2    Associated Diagnoses: ALCALA (dyspnea on exertion)      losartan (COZAAR) 25 mg tablet Take 1 Tab by mouth daily. Qty: 90 Tab, Refills: 3    Associated Diagnoses: Essential hypertension      atenolol (TENORMIN) 50 mg tablet Take 1 Tab by mouth daily. Qty: 90 Tab, Refills: 3    Associated Diagnoses: Essential hypertension      escitalopram oxalate (LEXAPRO) 10 mg tablet take 1 tablet by mouth once daily  Qty: 90 Tab, Refills: 5    Associated Diagnoses: Malaise and fatigue      montelukast (SINGULAIR) 10 mg tablet TAKE ONE TABLET BY MOUTH ONCE DAILY FOR ALLERGIES AND ASTHMA  Qty: 90 Tab, Refills: 3    Comments: Please consider 90 day supplies to promote better adherence  Associated Diagnoses: Non-seasonal allergic rhinitis      fluticasone propionate (Flonase) 50 mcg/actuation nasal spray 2 Sprays by Both Nostrils route daily. Qty: 3 Bottle, Refills: 11    Associated Diagnoses: Cough      azithromycin (Zithromax Z-José) 250 mg tablet Take two tablets today then one tablet daily  Qty: 6 Tab, Refills: 0    Associated Diagnoses: Upper respiratory tract infection, unspecified type      ondansetron hcl (ZOFRAN) 8 mg tablet Take 8 mg by mouth every eight (8) hours as needed for Nausea.   Qty: 35 Tab, Refills: 5    Associated Diagnoses: Esophagitis               Signed:  BHAKTI Green  6/15/2020  4:12 PM

## 2020-06-15 NOTE — PROGRESS NOTES
Sleep Disorder Breathing Screen:     Patient reports symptoms of:   · Snoring   · Excessive daytime sleepiness with napping  · STOP-BAN  · [place] Score: 13  · Height: 5'4\"  · Weight: 193 lbs  · BMI: 33.1     Spoke with patient in regards to her previous NPSG and the need for an updated assessment. Also discussed how sleep disordered breathing may potentiate cardiac related co-morbidities. Patient was very receptive and agreed. Refer patient for sleep study based on above assessment.

## 2020-06-15 NOTE — PROGRESS NOTES
Care Management Interventions  PCP Verified by CM: Yes  Last Visit to PCP: 05/14/20  Mode of Transport at Discharge: Other (see comment)([facility] [phone]  )  Transition of Care Consult (CM Consult): Discharge Planning  Discharge Durable Medical Equipment: No  Occupational Therapy Consult: No  Current Support Network: Lives with Spouse, Own Home  Confirm Follow Up Transport: Family  Discharge Location  Discharge Placement: Home\    Pt admitted to 3rd floor [facility] for dyspnea and angina. CM met with pt to discuss CM needs & DCP. Pt is A&Ox4. Pt is indep at home with all ADLS. Pt lives with her spouse. Pt has no DME needs. Pt has no difficulty with obtaining medications or transport. DCP home with spouse. No further needs noted. CM to continue to monitor.

## 2020-06-15 NOTE — H&P
History of present illness:55 y.o. female history of provoked DVT/PE following transatlantic flight on oral contraceptive therapy previously. Patient completed anticoagulation therapy for 6 months. She was seen by oncology with hypercoagulable work-up negative. She presented to /14/2020 with several day history of chest discomfort patient described a heaviness in the center of her chest.  Symptoms worse with activity additionally she described acute onset left chest wall pain 6/13/2020. Additionally patient endorsed several month history of shortness of breath that was evaluated by her primary care physician with echocardiogram.  Breathlessness when taking part in minimal physical activities. Spirometry performed 2017 with mild restriction no obstructive disease identified    Cardiac history  1. 2017 spirometry mild restrictive defect  2. 2017 echocardiogram normal left ventricular systolic function with EF 50 to 55% RVSP not reported no major valvular disease  3. 2017 ECG sinus rhythm with occasional premature ventricular complexes  4. 2020 echocardiogram normal for age   11. 6/14/2020 CT PE - for pulmonary embolus  6. 6/14/2020 ECG sinus rhythm with frequent premature ventricular beats    Assessment  1. Angina uncontrolled patient with symptoms that are accelerating and have worsened over the past several days. She describes dull substernal pressure in the center of her chest worse with exertion alleviated by rest.  Further evaluation with cardiac catheterization 6/15/2020. IV fluids overnight. Renal function will be evaluated in the morning. 2. PVCs frequent ventricular ectopic beats high burden of PVCs which could be causative of her shortness of breath. In the absence of coronary disease PVCs may be treated therapeutically with antiarrhythmic drug therapy. Additional EP referral for catheter ablation may be reasonable as well  3.  Hypertension uncontrolled        Review of Systems Constitutional: Negative for fever. HENT: Negative for hearing loss. Eyes: Negative for blurred vision. Respiratory: Negative for cough. Cardiovascular: Positive for chest pain. Gastrointestinal: Negative for heartburn. Musculoskeletal: Negative for myalgias. Skin: Negative for rash. Neurological: Negative for dizziness. Endo/Heme/Allergies: Does not bruise/bleed easily. Psychiatric/Behavioral: Negative for depression. Past Medical History:   Diagnosis Date    Gallstones 12    Hypertension     controlled with meds     Past Surgical History:   Procedure Laterality Date    HX BREAST LUMPECTOMY      benign    HX GYN      laparoscopies for endometriosis- x 4    HX LAP CHOLECYSTECTOMY       Family History   Problem Relation Age of Onset    Hypertension Mother     Diabetes Father     Other Maternal Grandfather         lymphoma    Other Paternal Grandfather         brain tumor    Pulmonary Embolism Paternal Grandfather     Pulmonary Embolism Other      Social History     Socioeconomic History    Marital status:      Spouse name: Not on file    Number of children: Not on file    Years of education: Not on file    Highest education level: Not on file   Occupational History    Not on file   Social Needs    Financial resource strain: Not on file    Food insecurity     Worry: Not on file     Inability: Not on file    Transportation needs     Medical: Not on file     Non-medical: Not on file   Tobacco Use    Smoking status: Former Smoker     Packs/day: 1.00     Years: 20.00     Pack years: 20.00     Types: Cigarettes     Last attempt to quit: 2007     Years since quittin.4    Smokeless tobacco: Never Used   Substance and Sexual Activity    Alcohol use:  Yes     Alcohol/week: 1.7 standard drinks     Types: 2 Glasses of wine per week    Drug use: No    Sexual activity: Not on file   Lifestyle    Physical activity     Days per week: Not on file     Minutes per session: Not on file    Stress: Not on file   Relationships    Social connections     Talks on phone: Not on file     Gets together: Not on file     Attends [employer] service: Not on file     Active member of club or organization: Not on file     Attends meetings of clubs or organizations: Not on file     Relationship status: Not on file    Intimate partner violence     Fear of current or ex partner: Not on file     Emotionally abused: Not on file     Physically abused: Not on file     Forced sexual activity: Not on file   Other Topics Concern    Not on file   Social History Narrative     and lives alone. Works as a .  Has 2 dogs.      Current Facility-Administered Medications   Medication Dose Route Frequency    albuterol-ipratropium (DUO-NEB) 2.5 MG-0.5 MG/3 ML  3 mL Nebulization Q4H PRN    [START ON 6/15/2020] atenoloL (TENORMIN) tablet 50 mg  50 mg Oral DAILY    baclofen (LIORESAL) tablet 10 mg  10 mg Oral TID PRN    [START ON 6/15/2020] escitalopram oxalate (LEXAPRO) tablet 10 mg  10 mg Oral DAILY    [START ON 6/15/2020] fluticasone propionate (FLONASE) 50 mcg/actuation nasal spray 2 Spray  2 Spray Both Nostrils DAILY    [START ON 6/15/2020] losartan (COZAAR) tablet 25 mg  25 mg Oral DAILY    montelukast (SINGULAIR) tablet 10 mg  10 mg Oral QHS    sodium chloride (NS) flush 5-40 mL  5-40 mL IntraVENous Q8H    sodium chloride (NS) flush 5-40 mL  5-40 mL IntraVENous PRN    [START ON 6/15/2020] aspirin chewable tablet 81 mg  81 mg Oral DAILY    [START ON 6/15/2020] nitroglycerin (NITROBID) 2 % ointment 1 Inch  1 Inch Topical Q6H    nitroglycerin (NITROSTAT) tablet 0.4 mg  0.4 mg SubLINGual Q5MIN PRN    morphine injection 2 mg  2 mg IntraVENous Q4H PRN    acetaminophen (TYLENOL) tablet 650 mg  650 mg Oral Q4H PRN    naloxone (NARCAN) injection 0.4 mg  0.4 mg IntraVENous PRN    ondansetron (ZOFRAN) injection 4 mg  4 mg IntraVENous Q4H PRN    alum-mag hydroxide-simeth (MYLANTA) oral suspension 30 mL  30 mL Oral Q4H PRN    diphenhydrAMINE (BENADRYL) capsule 25 mg  25 mg Oral Q6H PRN     Visit Vitals  BP (!) 144/92 (BP 1 Location: Right arm, BP Patient Position: Sitting)   Pulse 63   Temp 99.3 °F (37.4 °C)   Resp 17   Ht 5' 4\" (1.626 m)   Wt 86.5 kg (190 lb 12.8 oz)   SpO2 96%   BMI 32.75 kg/m²     Physical Exam   Constitutional: She is oriented to person, place, and time. HENT:   Head: Normocephalic and atraumatic. Eyes: Pupils are equal, round, and reactive to light. Conjunctivae are normal.   Neck: Normal range of motion. No JVD present. Cardiovascular: Normal rate and normal heart sounds. Extrasystoles are present. No murmur heard. Pulmonary/Chest: Effort normal. She has no wheezes. Abdominal: She exhibits no distension. Musculoskeletal:         General: No edema. Neurological: She is alert and oriented to person, place, and time. No cranial nerve deficit. Skin: Skin is warm and dry. No rash noted. She is not diaphoretic. Psychiatric: She has a normal mood and affect.        Intake/Output Summary (Last 24 hours) at 6/14/2020 2052  Last data filed at 6/14/2020 1950  Gross per 24 hour   Intake 120 ml   Output 0 ml   Net 120 ml     Lab Results   Component Value Date/Time    Sodium 142 08/01/2019 09:04 AM    Potassium 4.4 08/01/2019 09:04 AM    Chloride 105 08/01/2019 09:04 AM    CO2 23 08/01/2019 09:04 AM    Anion gap 10 10/31/2017 06:34 AM    Glucose 75 08/01/2019 09:04 AM    BUN 10 08/01/2019 09:04 AM    Creatinine 0.72 08/01/2019 09:04 AM    BUN/Creatinine ratio 14 08/01/2019 09:04 AM    GFR est  08/01/2019 09:04 AM    GFR est non-AA 97 08/01/2019 09:04 AM    Calcium 9.5 08/01/2019 09:04 AM     Lab Results   Component Value Date/Time    WBC 12.6 (H) 10/31/2017 06:34 AM    HGB 13.6 10/31/2017 06:34 AM    HCT 41.1 10/31/2017 06:34 AM    PLATELET 959 86/19/0601 06:34 AM    MCV 93.0 10/31/2017 06:34 AM     No results found for: CPK, RCK1, RCK2, RCK3, RCK4, CKMB, CKNDX, CKND1, TROPT, TROIQ, BNPP, BNP  Lab Results   Component Value Date/Time    Hemoglobin A1c 5.4 08/01/2019 09:04 AM         Assessment:  No diagnosis found.     Recommendations:

## 2020-06-16 ENCOUNTER — PATIENT OUTREACH (OUTPATIENT)
Dept: CASE MANAGEMENT | Age: 55
End: 2020-06-16

## 2020-06-16 NOTE — PROGRESS NOTES
Patient contacted regarding recent discharge and COVID-19 risk. Discussed COVID-19 related testing which was not done at this time. Test results were not done. Patient informed of results, if available? no    LPN Care Coordinator  contacted the patient by telephone to perform post discharge assessment. Verified name and  with patient as identifiers. Patient has following risk factors of: asthma. LPN Care Coordinator  reviewed discharge instructions, medical action plan and red flags related to discharge diagnosis. Reviewed and educated them on any new and changed medications related to discharge diagnosis. Advised obtaining a 90-day supply of all daily and as-needed medications. Education provided regarding infection prevention, and signs and symptoms of COVID-19 and when to seek medical attention with patient who verbalized understanding. Discussed exposure protocols and quarantine from [address] you at higher risk for severe illness  and given an opportunity for questions and concerns. The patient agrees to contact the COVID-19 hotline [phone] or PCP office for questions related to their healthcare. N Care Coordinator  provided contact information for future reference. From CDC: Are you at higher risk for severe illness?  Wash your hands often.  Avoid close contact (6 feet, which is about two arm lengths) with people who are sick.  Put distance between yourself and other people if COVID-19 is spreading in your community.  Clean and disinfect frequently touched surfaces.  Avoid all cruise travel and non-essential air travel.  Call your healthcare professional if you have concerns about COVID-19 and your underlying condition or if you are sick.     For more information on steps you can take to protect yourself, see CDC's How to Protect Yourself      Patient/family/caregiver given information for Anitha Diane and agrees to enroll no  Patient's preferred e-mail: N/A  Patient's preferred phone [phone]  Based on Loop alert triggers, patient will be contacted by nurse care manager for worsening symptoms. Plan for follow-up call in 7-14 days based on severity of symptoms and risk factors.

## 2020-06-16 NOTE — PROCEDURES
[facility]  CARDIAC CATH    Name:  Vadim Lyons  MR#:  [id number]  :  1965  ACCOUNT #:  [de-identified]  DATE OF SERVICE:  06/15/2020    PROCEDURES PERFORMED:  Left heart catheterization via the right radial artery with a 5-Wallisian Tiger catheter and angled pigtail. TR band was placed with good hemostasis. PREOPERATIVE DIAGNOSIS:  Chest pain. POSTOPERATIVE DIAGNOSIS:  Noncardiac chest pain. SURGEON:  Tonya Jacobsen MD    ASSISTANT:  None. ESTIMATED BLOOD LOSS:  5 cc    SPECIMENS REMOVED:  None. COMPLICATIONS:  None. IMPLANTS:  None. ANESTHESIA:  Provided by Denise Resendez, beginning at 3493 and concluding at [phone]. A total of 2 mg Versed, 25 mcg fentanyl were given. Vital signs and saturations were stable throughout. FINDINGS:  Left main coronary artery is in normal anatomic position, free of significant disease. Bifurcates into LAD and circumflex systems. The LAD has tandem 30% proximal narrowings, 20% midvessel narrowing. The distal and apical vessel are free of significant disease. There is a high obtuse marginal branch that bifurcates early and covers the entire lateral wall. This is a large vessel, free of significant disease. The circumflex terminates distally in a small obtuse marginal branch with mild 10% to 20% midvessel irregularities. The right coronary artery is a dominant vessel, in normal anatomic position, diffusely diseased up to 20% to 30% throughout the mid and distal segments. The PDA has a 20% narrowing and the distal RCA has a 20% to 30% narrowing. Left ventriculogram reveals normal LV systolic function. Ejection fraction is 55%. Left ventricular end-diastolic pressure is 13 mmHg with an opening aortic pressure of 112/65. There is no gradient across the aortic valve. CONCLUSION:  1. Mild diffuse coronary atherosclerotic heart disease with no high-grade lesions. 2.  Preserved LV systolic function.   3.  TR band for hemostasis.       Favio Hoffman MD      [id number]  D:  06/15/2020 16:11  T:  06/16/2020 11:07  JOB #:  [id number]

## 2020-06-16 NOTE — PROGRESS NOTES
1st Attempt to contact patient for [facility] Covid 19 Risk call, no answer on mobile number with message left on V./M requesting a return call .  Will attempt to contact patient again within 24 hours

## 2020-06-30 ENCOUNTER — PATIENT OUTREACH (OUTPATIENT)
Dept: CASE MANAGEMENT | Age: 55
End: 2020-06-30

## 2020-06-30 NOTE — PROGRESS NOTES
Patient resolved from Transition of Care episode on 6/30/2020  Discussed COVID-19 related testing which was not done at this time. Test results were not done. Patient informed of results, if available? no     Patient/family has been provided the following resources and education related to COVID-19:                         Signs, symptoms and red flags related to COVID-19            CDC exposure and quarantine guidelines            [facility] exposure contact - [phone]            Contact for their local Department of Health                 Patient currently reports that she has no current COVID 19 related symptoms. No further outreach scheduled with this CTN/ACM/LPN/HC/ MA. Episode of Care resolved. Patient has this CTN/ACM/LPN/HC/MA contact information if future needs arise.

## 2020-09-14 ENCOUNTER — HOSPITAL ENCOUNTER (OUTPATIENT)
Dept: SLEEP MEDICINE | Age: 55
Discharge: HOME | End: 2020-09-14
Payer: COMMERCIAL

## 2020-09-14 PROCEDURE — 95806 SLEEP STUDY UNATT&RESP EFFT: CPT

## 2020-09-22 PROBLEM — I49.3 PVC (PREMATURE VENTRICULAR CONTRACTION): Status: ACTIVE | Noted: 2020-09-22

## 2020-09-22 PROBLEM — E66.9 OBESITY (BMI 30-39.9): Status: ACTIVE | Noted: 2020-09-22

## 2020-09-22 PROBLEM — R09.02 HYPOXEMIA: Status: ACTIVE | Noted: 2020-09-22

## 2020-10-07 ENCOUNTER — APPOINTMENT (RX ONLY)
Dept: URBAN - METROPOLITAN AREA CLINIC 349 | Facility: CLINIC | Age: 55
Setting detail: DERMATOLOGY
End: 2020-10-07

## 2020-10-07 DIAGNOSIS — D22 MELANOCYTIC NEVI: ICD-10-CM

## 2020-10-07 DIAGNOSIS — L70.0 ACNE VULGARIS: ICD-10-CM

## 2020-10-07 DIAGNOSIS — Z80.8 FAMILY HISTORY OF MALIGNANT NEOPLASM OF OTHER ORGANS OR SYSTEMS: ICD-10-CM

## 2020-10-07 DIAGNOSIS — Z71.89 OTHER SPECIFIED COUNSELING: ICD-10-CM

## 2020-10-07 DIAGNOSIS — L82.1 OTHER SEBORRHEIC KERATOSIS: ICD-10-CM

## 2020-10-07 PROBLEM — D22.72 MELANOCYTIC NEVI OF LEFT LOWER LIMB, INCLUDING HIP: Status: ACTIVE | Noted: 2020-10-07

## 2020-10-07 PROBLEM — D22.62 MELANOCYTIC NEVI OF LEFT UPPER LIMB, INCLUDING SHOULDER: Status: ACTIVE | Noted: 2020-10-07

## 2020-10-07 PROBLEM — D22.5 MELANOCYTIC NEVI OF TRUNK: Status: ACTIVE | Noted: 2020-10-07

## 2020-10-07 PROBLEM — D22.71 MELANOCYTIC NEVI OF RIGHT LOWER LIMB, INCLUDING HIP: Status: ACTIVE | Noted: 2020-10-07

## 2020-10-07 PROBLEM — D22.61 MELANOCYTIC NEVI OF RIGHT UPPER LIMB, INCLUDING SHOULDER: Status: ACTIVE | Noted: 2020-10-07

## 2020-10-07 PROCEDURE — ? PRESCRIPTION

## 2020-10-07 PROCEDURE — ? EDUCATIONAL RESOURCES PROVIDED

## 2020-10-07 PROCEDURE — 99213 OFFICE O/P EST LOW 20 MIN: CPT

## 2020-10-07 PROCEDURE — ? OTHER

## 2020-10-07 PROCEDURE — ? TREATMENT REGIMEN

## 2020-10-07 PROCEDURE — ? COUNSELING

## 2020-10-07 RX ORDER — TRIFAROTENE 50 UG/G
CREAM TOPICAL
Qty: 1 | Refills: 1 | Status: ERX | COMMUNITY
Start: 2020-10-07

## 2020-10-07 RX ORDER — DOXYCYCLINE HYCLATE 100 MG/1
CAPSULE, GELATIN COATED ORAL
Qty: 30 | Refills: 2 | Status: ERX | COMMUNITY
Start: 2020-10-07

## 2020-10-07 RX ADMIN — TRIFAROTENE: 50 CREAM TOPICAL at 00:00

## 2020-10-07 RX ADMIN — DOXYCYCLINE HYCLATE: 100 CAPSULE, GELATIN COATED ORAL at 00:00

## 2020-10-07 ASSESSMENT — LOCATION DETAILED DESCRIPTION DERM
LOCATION DETAILED: LEFT CENTRAL BUCCAL CHEEK
LOCATION DETAILED: RIGHT ANTERIOR DISTAL THIGH
LOCATION DETAILED: RIGHT PROXIMAL PRETIBIAL REGION
LOCATION DETAILED: PERIUMBILICAL SKIN
LOCATION DETAILED: SUPERIOR LUMBAR SPINE
LOCATION DETAILED: LEFT MEDIAL SUPERIOR CHEST
LOCATION DETAILED: LEFT VENTRAL PROXIMAL FOREARM
LOCATION DETAILED: RIGHT ANTERIOR DISTAL UPPER ARM
LOCATION DETAILED: LEFT INFERIOR UPPER BACK
LOCATION DETAILED: RIGHT INFERIOR LATERAL NECK
LOCATION DETAILED: LEFT MEDIAL UPPER BACK
LOCATION DETAILED: RIGHT DISTAL POSTERIOR THIGH
LOCATION DETAILED: INFERIOR THORACIC SPINE
LOCATION DETAILED: LEFT PROXIMAL PRETIBIAL REGION
LOCATION DETAILED: LEFT MID-UPPER BACK

## 2020-10-07 ASSESSMENT — LOCATION ZONE DERM
LOCATION ZONE: ARM
LOCATION ZONE: LEG
LOCATION ZONE: TRUNK
LOCATION ZONE: FACE
LOCATION ZONE: NECK

## 2020-10-07 ASSESSMENT — LOCATION SIMPLE DESCRIPTION DERM
LOCATION SIMPLE: RIGHT ANTERIOR NECK
LOCATION SIMPLE: LEFT FOREARM
LOCATION SIMPLE: LEFT CHEEK
LOCATION SIMPLE: RIGHT POSTERIOR THIGH
LOCATION SIMPLE: LOWER BACK
LOCATION SIMPLE: UPPER BACK
LOCATION SIMPLE: RIGHT UPPER ARM
LOCATION SIMPLE: CHEST
LOCATION SIMPLE: RIGHT PRETIBIAL REGION
LOCATION SIMPLE: ABDOMEN
LOCATION SIMPLE: LEFT PRETIBIAL REGION
LOCATION SIMPLE: LEFT UPPER BACK
LOCATION SIMPLE: RIGHT THIGH

## 2020-10-07 NOTE — PROCEDURE: TREATMENT REGIMEN
Initiate Treatment: Doxycycline 100mg take once daily for 1-2 weeks for flares \\nAklief apply to face once daily\\nMoisturize daily
Detail Level: Zone
Continue Regimen: Wash twice daily with cervae
Samples Given: Aklief\\nCervae cream

## 2020-10-07 NOTE — PROCEDURE: COUNSELING
Topical Clindamycin Pregnancy And Lactation Text: This medication is Pregnancy Category B and is considered safe during pregnancy. It is unknown if it is excreted in breast milk.
Bactrim Pregnancy And Lactation Text: This medication is Pregnancy Category D and is known to cause fetal risk.  It is also excreted in breast milk.
Spironolactone Pregnancy And Lactation Text: This medication can cause feminization of the male fetus and should be avoided during pregnancy. The active metabolite is also found in breast milk.
Tetracycline Counseling: Patient counseled regarding possible photosensitivity and increased risk for sunburn.  Patient instructed to avoid sunlight, if possible.  When exposed to sunlight, patients should wear protective clothing, sunglasses, and sunscreen.  The patient was instructed to call the office immediately if the following severe adverse effects occur:  hearing changes, easy bruising/bleeding, severe headache, or vision changes.  The patient verbalized understanding of the proper use and possible adverse effects of tetracycline.  All of the patient's questions and concerns were addressed. Patient understands to avoid pregnancy while on therapy due to potential birth defects.
Topical Retinoid Pregnancy And Lactation Text: This medication is Pregnancy Category C. It is unknown if this medication is excreted in breast milk.
Azithromycin Counseling:  I discussed with the patient the risks of azithromycin including but not limited to GI upset, allergic reaction, drug rash, diarrhea, and yeast infections.
Minocycline Counseling: Patient advised regarding possible photosensitivity and discoloration of the teeth, skin, lips, tongue and gums.  Patient instructed to avoid sunlight, if possible.  When exposed to sunlight, patients should wear protective clothing, sunglasses, and sunscreen.  The patient was instructed to call the office immediately if the following severe adverse effects occur:  hearing changes, easy bruising/bleeding, severe headache, or vision changes.  The patient verbalized understanding of the proper use and possible adverse effects of minocycline.  All of the patient's questions and concerns were addressed.
Azithromycin Pregnancy And Lactation Text: This medication is considered safe during pregnancy and is also secreted in breast milk.
Doxycycline Counseling:  Patient counseled regarding possible photosensitivity and increased risk for sunburn.  Patient instructed to avoid sunlight, if possible.  When exposed to sunlight, patients should wear protective clothing, sunglasses, and sunscreen.  The patient was instructed to call the office immediately if the following severe adverse effects occur:  hearing changes, easy bruising/bleeding, severe headache, or vision changes.  The patient verbalized understanding of the proper use and possible adverse effects of doxycycline.  All of the patient's questions and concerns were addressed.
Detail Level: Zone
Birth Control Pills Counseling: Birth Control Pill Counseling: I discussed with the patient the potential side effects of OCPs including but not limited to increased risk of stroke, heart attack, thrombophlebitis, deep venous thrombosis, hepatic adenomas, breast changes, GI upset, headaches, and depression.  The patient verbalized understanding of the proper use and possible adverse effects of OCPs. All of the patient's questions and concerns were addressed.
Benzoyl Peroxide Counseling: Patient counseled that medicine may cause skin irritation and bleach clothing.  In the event of skin irritation, the patient was advised to reduce the amount of the drug applied or use it less frequently.   The patient verbalized understanding of the proper use and possible adverse effects of benzoyl peroxide.  All of the patient's questions and concerns were addressed.
Tazorac Pregnancy And Lactation Text: This medication is not safe during pregnancy. It is unknown if this medication is excreted in breast milk.
High Dose Vitamin A Pregnancy And Lactation Text: High dose vitamin A therapy is contraindicated during pregnancy and breast feeding.
Topical Sulfur Applications Pregnancy And Lactation Text: This medication is Pregnancy Category C and has an unknown safety profile during pregnancy. It is unknown if this topical medication is excreted in breast milk.
Doxycycline Pregnancy And Lactation Text: This medication is Pregnancy Category D and not consider safe during pregnancy. It is also excreted in breast milk but is considered safe for shorter treatment courses.
Detail Level: Generalized
High Dose Vitamin A Counseling: Side effects reviewed, pt to contact office should one occur.
Topical Retinoid counseling:  Patient advised to apply a pea-sized amount only at bedtime and wait 30 minutes after washing their face before applying.  If too drying, patient may add a non-comedogenic moisturizer. The patient verbalized understanding of the proper use and possible adverse effects of retinoids.  All of the patient's questions and concerns were addressed.
Topical Sulfur Applications Counseling: Topical Sulfur Counseling: Patient counseled that this medication may cause skin irritation or allergic reactions.  In the event of skin irritation, the patient was advised to reduce the amount of the drug applied or use it less frequently.   The patient verbalized understanding of the proper use and possible adverse effects of topical sulfur application.  All of the patient's questions and concerns were addressed.
Dapsone Pregnancy And Lactation Text: This medication is Pregnancy Category C and is not considered safe during pregnancy or breast feeding.
Birth Control Pills Pregnancy And Lactation Text: This medication should be avoided if pregnant and for the first 30 days post-partum.
Topical Clindamycin Counseling: Patient counseled that this medication may cause skin irritation or allergic reactions.  In the event of skin irritation, the patient was advised to reduce the amount of the drug applied or use it less frequently.   The patient verbalized understanding of the proper use and possible adverse effects of clindamycin.  All of the patient's questions and concerns were addressed.
Sarecycline Pregnancy And Lactation Text: This medication is Pregnancy Category D and not consider safe during pregnancy. It is also excreted in breast milk.
Tazorac Counseling:  Patient advised that medication is irritating and drying.  Patient may need to apply sparingly and wash off after an hour before eventually leaving it on overnight.  The patient verbalized understanding of the proper use and possible adverse effects of tazorac.  All of the patient's questions and concerns were addressed.
Erythromycin Pregnancy And Lactation Text: This medication is Pregnancy Category B and is considered safe during pregnancy. It is also excreted in breast milk.
Benzoyl Peroxide Pregnancy And Lactation Text: This medication is Pregnancy Category C. It is unknown if benzoyl peroxide is excreted in breast milk.
Isotretinoin Counseling: Patient should get monthly blood tests, not donate blood, not drive at night if vision affected, not share medication, and not undergo elective surgery for 6 months after tx completed. Side effects reviewed, pt to contact office should one occur.
Include Pregnancy/Lactation Warning?: No
Bactrim Counseling:  I discussed with the patient the risks of sulfa antibiotics including but not limited to GI upset, allergic reaction, drug rash, diarrhea, dizziness, photosensitivity, and yeast infections.  Rarely, more serious reactions can occur including but not limited to aplastic anemia, agranulocytosis, methemoglobinemia, blood dyscrasias, liver or kidney failure, lung infiltrates or desquamative/blistering drug rashes.
Spironolactone Counseling: Patient advised regarding risks of diarrhea, abdominal pain, hyperkalemia, birth defects (for female patients), liver toxicity and renal toxicity. The patient may need blood work to monitor liver and kidney function and potassium levels while on therapy. The patient verbalized understanding of the proper use and possible adverse effects of spironolactone.  All of the patient's questions and concerns were addressed.
Dapsone Counseling: I discussed with the patient the risks of dapsone including but not limited to hemolytic anemia, agranulocytosis, rashes, methemoglobinemia, kidney failure, peripheral neuropathy, headaches, GI upset, and liver toxicity.  Patients who start dapsone require monitoring including baseline LFTs and weekly CBCs for the first month, then every month thereafter.  The patient verbalized understanding of the proper use and possible adverse effects of dapsone.  All of the patient's questions and concerns were addressed.
Erythromycin Counseling:  I discussed with the patient the risks of erythromycin including but not limited to GI upset, allergic reaction, drug rash, diarrhea, increase in liver enzymes, and yeast infections.
Sarecycline Counseling: Patient advised regarding possible photosensitivity and discoloration of the teeth, skin, lips, tongue and gums.  Patient instructed to avoid sunlight, if possible.  When exposed to sunlight, patients should wear protective clothing, sunglasses, and sunscreen.  The patient was instructed to call the office immediately if the following severe adverse effects occur:  hearing changes, easy bruising/bleeding, severe headache, or vision changes.  The patient verbalized understanding of the proper use and possible adverse effects of sarecycline.  All of the patient's questions and concerns were addressed.
Isotretinoin Pregnancy And Lactation Text: This medication is Pregnancy Category X and is considered extremely dangerous during pregnancy. It is unknown if it is excreted in breast milk.

## 2020-10-07 NOTE — PROCEDURE: OTHER
Note Text (......Xxx Chief Complaint.): This diagnosis correlates with the
Other (Free Text): Patient states her flares are painful. She no longer has a period so she denies worsening around her menstrual cycle. \\n\\nPatient is washing with cervae and denies any dryness.\\n\\nWe will prescribe an antibiotic to help with flares. Counseled on risks and side effects of antibiotics and retinoids.
Detail Level: Zone

## 2020-10-07 NOTE — HPI: PIMPLES (ACNE)
How Severe Is Your Acne?: mild
Is This A New Presentation, Or A Follow-Up?: Acne
Additional Comments (Use Complete Sentences): Patient is here to discuss acne. Patient states she has been struggling with breakouts ever since she had to start wearing a mask daily. Patient states she is washing with cervae daily.

## 2020-10-07 NOTE — HPI: SKIN LESION
How Severe Is Your Skin Lesion?: mild
Has Your Skin Lesion Been Treated?: not been treated
Is This A New Presentation, Or A Follow-Up?: Skin Lesion
Which Family Member (Optional)?: Grandfather
Additional History: Patient is here for her annual skin check. Patient denies any lesions of concern and denies any growing, bleeding or changing. Patient denies personal history of melanoma but has a family history of it.

## 2021-04-07 ENCOUNTER — APPOINTMENT (RX ONLY)
Dept: URBAN - METROPOLITAN AREA CLINIC 349 | Facility: CLINIC | Age: 56
Setting detail: DERMATOLOGY
End: 2021-04-07

## 2021-04-07 DIAGNOSIS — L70.0 ACNE VULGARIS: ICD-10-CM | Status: INADEQUATELY CONTROLLED

## 2021-04-07 PROCEDURE — ? COUNSELING

## 2021-04-07 PROCEDURE — 99214 OFFICE O/P EST MOD 30 MIN: CPT

## 2021-04-07 PROCEDURE — ? OTHER

## 2021-04-07 PROCEDURE — ? PRESCRIPTION MEDICATION MANAGEMENT

## 2021-04-07 PROCEDURE — ? PRESCRIPTION

## 2021-04-07 RX ORDER — MINOCYCLINE 40 MG/G
AEROSOL, FOAM TOPICAL
Qty: 1 | Refills: 2 | Status: ERX | COMMUNITY
Start: 2021-04-07

## 2021-04-07 RX ADMIN — MINOCYCLINE: 40 AEROSOL, FOAM TOPICAL at 00:00

## 2021-04-07 ASSESSMENT — LOCATION ZONE DERM: LOCATION ZONE: FACE

## 2021-04-07 ASSESSMENT — LOCATION SIMPLE DESCRIPTION DERM: LOCATION SIMPLE: LEFT CHEEK

## 2021-04-07 ASSESSMENT — LOCATION DETAILED DESCRIPTION DERM: LOCATION DETAILED: LEFT CENTRAL BUCCAL CHEEK

## 2021-04-07 NOTE — PROCEDURE: OTHER
Detail Level: Zone
Other (Free Text): Patient is here today for a follow up on her acne. She states it’s doing well but she is still dealing with frequent breakouts. She is taking the doxycycline once a month for major flares. \\n\\nPatient is on blood pressure medication
Note Text (......Xxx Chief Complaint.): This diagnosis correlates with the

## 2021-04-07 NOTE — PROCEDURE: PRESCRIPTION MEDICATION MANAGEMENT
Detail Level: Zone
Render In Strict Bullet Format?: No
Initiate Treatment: Amzeeq foam apply nightly to face
Continue Regimen: Doxycycline 100mg take once daily for 1-2 weeks for flares \\nAklief apply to face once daily\\nMoisturize daily. \\nWash twice daily with cervae.
Samples Given: Amzeeq foam

## 2021-04-07 NOTE — PROCEDURE: COUNSELING
Azithromycin Counseling:  I discussed with the patient the risks of azithromycin including but not limited to GI upset, allergic reaction, drug rash, diarrhea, and yeast infections.
Birth Control Pills Pregnancy And Lactation Text: This medication should be avoided if pregnant and for the first 30 days post-partum.
Benzoyl Peroxide Counseling: Patient counseled that medicine may cause skin irritation and bleach clothing.  In the event of skin irritation, the patient was advised to reduce the amount of the drug applied or use it less frequently.   The patient verbalized understanding of the proper use and possible adverse effects of benzoyl peroxide.  All of the patient's questions and concerns were addressed.
Tazorac Pregnancy And Lactation Text: This medication is not safe during pregnancy. It is unknown if this medication is excreted in breast milk.
High Dose Vitamin A Pregnancy And Lactation Text: High dose vitamin A therapy is contraindicated during pregnancy and breast feeding.
Birth Control Pills Counseling: Birth Control Pill Counseling: I discussed with the patient the potential side effects of OCPs including but not limited to increased risk of stroke, heart attack, thrombophlebitis, deep venous thrombosis, hepatic adenomas, breast changes, GI upset, headaches, and depression.  The patient verbalized understanding of the proper use and possible adverse effects of OCPs. All of the patient's questions and concerns were addressed.
Isotretinoin Counseling: Patient should get monthly blood tests, not donate blood, not drive at night if vision affected, not share medication, and not undergo elective surgery for 6 months after tx completed. Side effects reviewed, pt to contact office should one occur.
Topical Sulfur Applications Pregnancy And Lactation Text: This medication is Pregnancy Category C and has an unknown safety profile during pregnancy. It is unknown if this topical medication is excreted in breast milk.
High Dose Vitamin A Counseling: Side effects reviewed, pt to contact office should one occur.
Topical Retinoid counseling:  Patient advised to apply a pea-sized amount only at bedtime and wait 30 minutes after washing their face before applying.  If too drying, patient may add a non-comedogenic moisturizer. The patient verbalized understanding of the proper use and possible adverse effects of retinoids.  All of the patient's questions and concerns were addressed.
Topical Retinoid Pregnancy And Lactation Text: This medication is Pregnancy Category C. It is unknown if this medication is excreted in breast milk.
Azithromycin Pregnancy And Lactation Text: This medication is considered safe during pregnancy and is also secreted in breast milk.
Include Pregnancy/Lactation Warning?: No
Topical Clindamycin Counseling: Patient counseled that this medication may cause skin irritation or allergic reactions.  In the event of skin irritation, the patient was advised to reduce the amount of the drug applied or use it less frequently.   The patient verbalized understanding of the proper use and possible adverse effects of clindamycin.  All of the patient's questions and concerns were addressed.
Topical Sulfur Applications Counseling: Topical Sulfur Counseling: Patient counseled that this medication may cause skin irritation or allergic reactions.  In the event of skin irritation, the patient was advised to reduce the amount of the drug applied or use it less frequently.   The patient verbalized understanding of the proper use and possible adverse effects of topical sulfur application.  All of the patient's questions and concerns were addressed.
Detail Level: Zone
Sarecycline Pregnancy And Lactation Text: This medication is Pregnancy Category D and not consider safe during pregnancy. It is also excreted in breast milk.
Doxycycline Counseling:  Patient counseled regarding possible photosensitivity and increased risk for sunburn.  Patient instructed to avoid sunlight, if possible.  When exposed to sunlight, patients should wear protective clothing, sunglasses, and sunscreen.  The patient was instructed to call the office immediately if the following severe adverse effects occur:  hearing changes, easy bruising/bleeding, severe headache, or vision changes.  The patient verbalized understanding of the proper use and possible adverse effects of doxycycline.  All of the patient's questions and concerns were addressed.
Dapsone Pregnancy And Lactation Text: This medication is Pregnancy Category C and is not considered safe during pregnancy or breast feeding.
Bactrim Counseling:  I discussed with the patient the risks of sulfa antibiotics including but not limited to GI upset, allergic reaction, drug rash, diarrhea, dizziness, photosensitivity, and yeast infections.  Rarely, more serious reactions can occur including but not limited to aplastic anemia, agranulocytosis, methemoglobinemia, blood dyscrasias, liver or kidney failure, lung infiltrates or desquamative/blistering drug rashes.
Erythromycin Pregnancy And Lactation Text: This medication is Pregnancy Category B and is considered safe during pregnancy. It is also excreted in breast milk.
Tazorac Counseling:  Patient advised that medication is irritating and drying.  Patient may need to apply sparingly and wash off after an hour before eventually leaving it on overnight.  The patient verbalized understanding of the proper use and possible adverse effects of tazorac.  All of the patient's questions and concerns were addressed.
Benzoyl Peroxide Pregnancy And Lactation Text: This medication is Pregnancy Category C. It is unknown if benzoyl peroxide is excreted in breast milk.
Spironolactone Counseling: Patient advised regarding risks of diarrhea, abdominal pain, hyperkalemia, birth defects (for female patients), liver toxicity and renal toxicity. The patient may need blood work to monitor liver and kidney function and potassium levels while on therapy. The patient verbalized understanding of the proper use and possible adverse effects of spironolactone.  All of the patient's questions and concerns were addressed.
Erythromycin Counseling:  I discussed with the patient the risks of erythromycin including but not limited to GI upset, allergic reaction, drug rash, diarrhea, increase in liver enzymes, and yeast infections.
Topical Clindamycin Pregnancy And Lactation Text: This medication is Pregnancy Category B and is considered safe during pregnancy. It is unknown if it is excreted in breast milk.
Sarecycline Counseling: Patient advised regarding possible photosensitivity and discoloration of the teeth, skin, lips, tongue and gums.  Patient instructed to avoid sunlight, if possible.  When exposed to sunlight, patients should wear protective clothing, sunglasses, and sunscreen.  The patient was instructed to call the office immediately if the following severe adverse effects occur:  hearing changes, easy bruising/bleeding, severe headache, or vision changes.  The patient verbalized understanding of the proper use and possible adverse effects of sarecycline.  All of the patient's questions and concerns were addressed.
Isotretinoin Pregnancy And Lactation Text: This medication is Pregnancy Category X and is considered extremely dangerous during pregnancy. It is unknown if it is excreted in breast milk.
Minocycline Counseling: Patient advised regarding possible photosensitivity and discoloration of the teeth, skin, lips, tongue and gums.  Patient instructed to avoid sunlight, if possible.  When exposed to sunlight, patients should wear protective clothing, sunglasses, and sunscreen.  The patient was instructed to call the office immediately if the following severe adverse effects occur:  hearing changes, easy bruising/bleeding, severe headache, or vision changes.  The patient verbalized understanding of the proper use and possible adverse effects of minocycline.  All of the patient's questions and concerns were addressed.
Dapsone Counseling: I discussed with the patient the risks of dapsone including but not limited to hemolytic anemia, agranulocytosis, rashes, methemoglobinemia, kidney failure, peripheral neuropathy, headaches, GI upset, and liver toxicity.  Patients who start dapsone require monitoring including baseline LFTs and weekly CBCs for the first month, then every month thereafter.  The patient verbalized understanding of the proper use and possible adverse effects of dapsone.  All of the patient's questions and concerns were addressed.
Tetracycline Counseling: Patient counseled regarding possible photosensitivity and increased risk for sunburn.  Patient instructed to avoid sunlight, if possible.  When exposed to sunlight, patients should wear protective clothing, sunglasses, and sunscreen.  The patient was instructed to call the office immediately if the following severe adverse effects occur:  hearing changes, easy bruising/bleeding, severe headache, or vision changes.  The patient verbalized understanding of the proper use and possible adverse effects of tetracycline.  All of the patient's questions and concerns were addressed. Patient understands to avoid pregnancy while on therapy due to potential birth defects.
Bactrim Pregnancy And Lactation Text: This medication is Pregnancy Category D and is known to cause fetal risk.  It is also excreted in breast milk.
Doxycycline Pregnancy And Lactation Text: This medication is Pregnancy Category D and not consider safe during pregnancy. It is also excreted in breast milk but is considered safe for shorter treatment courses.
Spironolactone Pregnancy And Lactation Text: This medication can cause feminization of the male fetus and should be avoided during pregnancy. The active metabolite is also found in breast milk.

## 2021-10-07 ENCOUNTER — APPOINTMENT (RX ONLY)
Dept: URBAN - METROPOLITAN AREA CLINIC 349 | Facility: CLINIC | Age: 56
Setting detail: DERMATOLOGY
End: 2021-10-07

## 2021-10-07 DIAGNOSIS — L70.0 ACNE VULGARIS: ICD-10-CM | Status: IMPROVED

## 2021-10-07 DIAGNOSIS — Z80.8 FAMILY HISTORY OF MALIGNANT NEOPLASM OF OTHER ORGANS OR SYSTEMS: ICD-10-CM

## 2021-10-07 DIAGNOSIS — D22 MELANOCYTIC NEVI: ICD-10-CM

## 2021-10-07 DIAGNOSIS — Z12.83 ENCOUNTER FOR SCREENING FOR MALIGNANT NEOPLASM OF SKIN: ICD-10-CM

## 2021-10-07 DIAGNOSIS — Z71.89 OTHER SPECIFIED COUNSELING: ICD-10-CM

## 2021-10-07 DIAGNOSIS — L82.1 OTHER SEBORRHEIC KERATOSIS: ICD-10-CM

## 2021-10-07 PROBLEM — D22.72 MELANOCYTIC NEVI OF LEFT LOWER LIMB, INCLUDING HIP: Status: ACTIVE | Noted: 2021-10-07

## 2021-10-07 PROBLEM — D22.71 MELANOCYTIC NEVI OF RIGHT LOWER LIMB, INCLUDING HIP: Status: ACTIVE | Noted: 2021-10-07

## 2021-10-07 PROBLEM — D22.61 MELANOCYTIC NEVI OF RIGHT UPPER LIMB, INCLUDING SHOULDER: Status: ACTIVE | Noted: 2021-10-07

## 2021-10-07 PROBLEM — D22.5 MELANOCYTIC NEVI OF TRUNK: Status: ACTIVE | Noted: 2021-10-07

## 2021-10-07 PROBLEM — D22.62 MELANOCYTIC NEVI OF LEFT UPPER LIMB, INCLUDING SHOULDER: Status: ACTIVE | Noted: 2021-10-07

## 2021-10-07 PROCEDURE — ? SUNSCREEN RECOMMENDATIONS

## 2021-10-07 PROCEDURE — ? OTHER

## 2021-10-07 PROCEDURE — ? PRESCRIPTION

## 2021-10-07 PROCEDURE — 99213 OFFICE O/P EST LOW 20 MIN: CPT

## 2021-10-07 PROCEDURE — ? COUNSELING

## 2021-10-07 PROCEDURE — ? PRESCRIPTION MEDICATION MANAGEMENT

## 2021-10-07 RX ORDER — DOXYCYCLINE HYCLATE 100 MG/1
CAPSULE, GELATIN COATED ORAL
Qty: 30 | Refills: 2 | Status: ERX | COMMUNITY
Start: 2021-10-07

## 2021-10-07 RX ADMIN — DOXYCYCLINE HYCLATE: 100 CAPSULE, GELATIN COATED ORAL at 00:00

## 2021-10-07 ASSESSMENT — LOCATION DETAILED DESCRIPTION DERM
LOCATION DETAILED: LEFT MEDIAL UPPER BACK
LOCATION DETAILED: PERIUMBILICAL SKIN
LOCATION DETAILED: INFERIOR THORACIC SPINE
LOCATION DETAILED: LEFT VENTRAL PROXIMAL FOREARM
LOCATION DETAILED: RIGHT DISTAL POSTERIOR THIGH
LOCATION DETAILED: LEFT INFERIOR UPPER BACK
LOCATION DETAILED: RIGHT PROXIMAL PRETIBIAL REGION
LOCATION DETAILED: RIGHT INFERIOR LATERAL NECK
LOCATION DETAILED: LEFT MEDIAL SUPERIOR CHEST
LOCATION DETAILED: RIGHT ANTERIOR DISTAL UPPER ARM
LOCATION DETAILED: LEFT CENTRAL BUCCAL CHEEK
LOCATION DETAILED: SUPERIOR LUMBAR SPINE
LOCATION DETAILED: LEFT PROXIMAL PRETIBIAL REGION
LOCATION DETAILED: RIGHT ANTERIOR DISTAL THIGH

## 2021-10-07 ASSESSMENT — LOCATION ZONE DERM
LOCATION ZONE: TRUNK
LOCATION ZONE: FACE
LOCATION ZONE: ARM
LOCATION ZONE: NECK
LOCATION ZONE: LEG

## 2021-10-07 ASSESSMENT — LOCATION SIMPLE DESCRIPTION DERM
LOCATION SIMPLE: LOWER BACK
LOCATION SIMPLE: CHEST
LOCATION SIMPLE: ABDOMEN
LOCATION SIMPLE: RIGHT POSTERIOR THIGH
LOCATION SIMPLE: LEFT CHEEK
LOCATION SIMPLE: UPPER BACK
LOCATION SIMPLE: LEFT PRETIBIAL REGION
LOCATION SIMPLE: RIGHT UPPER ARM
LOCATION SIMPLE: LEFT UPPER BACK
LOCATION SIMPLE: RIGHT THIGH
LOCATION SIMPLE: LEFT FOREARM
LOCATION SIMPLE: RIGHT PRETIBIAL REGION
LOCATION SIMPLE: RIGHT ANTERIOR NECK

## 2021-10-07 NOTE — PROCEDURE: PRESCRIPTION MEDICATION MANAGEMENT
Continue Regimen: Doxycycline 100mg take once daily for 1-2 weeks for flares \\n\\nAklief apply to face once daily\\n\\nMoisturize daily. \\n\\nWash twice daily with cervae.\\n\\nAmzeeq foam apply nightly to face
Detail Level: Zone
Render In Strict Bullet Format?: No

## 2021-10-07 NOTE — PROCEDURE: OTHER
Detail Level: Zone
Note Text (......Xxx Chief Complaint.): This diagnosis correlates with the
Other (Free Text): Patient stated she has done well with current treatment regimen. Patient needs refills on doxycycline.

## 2021-10-07 NOTE — PROCEDURE: COUNSELING
Detail Level: Generalized
Isotretinoin Pregnancy And Lactation Text: This medication is Pregnancy Category X and is considered extremely dangerous during pregnancy. It is unknown if it is excreted in breast milk.
Minocycline Pregnancy And Lactation Text: This medication is Pregnancy Category D and not consider safe during pregnancy. It is also excreted in breast milk.
Azithromycin Counseling:  I discussed with the patient the risks of azithromycin including but not limited to GI upset, allergic reaction, drug rash, diarrhea, and yeast infections.
Tazorac Pregnancy And Lactation Text: This medication is not safe during pregnancy. It is unknown if this medication is excreted in breast milk.
Dapsone Counseling: I discussed with the patient the risks of dapsone including but not limited to hemolytic anemia, agranulocytosis, rashes, methemoglobinemia, kidney failure, peripheral neuropathy, headaches, GI upset, and liver toxicity.  Patients who start dapsone require monitoring including baseline LFTs and weekly CBCs for the first month, then every month thereafter.  The patient verbalized understanding of the proper use and possible adverse effects of dapsone.  All of the patient's questions and concerns were addressed.
Spironolactone Counseling: Patient advised regarding risks of diarrhea, abdominal pain, hyperkalemia, birth defects (for female patients), liver toxicity and renal toxicity. The patient may need blood work to monitor liver and kidney function and potassium levels while on therapy. The patient verbalized understanding of the proper use and possible adverse effects of spironolactone.  All of the patient's questions and concerns were addressed.
Spironolactone Pregnancy And Lactation Text: This medication can cause feminization of the male fetus and should be avoided during pregnancy. The active metabolite is also found in breast milk.
Minocycline Counseling: Patient advised regarding possible photosensitivity and discoloration of the teeth, skin, lips, tongue and gums.  Patient instructed to avoid sunlight, if possible.  When exposed to sunlight, patients should wear protective clothing, sunglasses, and sunscreen.  The patient was instructed to call the office immediately if the following severe adverse effects occur:  hearing changes, easy bruising/bleeding, severe headache, or vision changes.  The patient verbalized understanding of the proper use and possible adverse effects of minocycline.  All of the patient's questions and concerns were addressed.
Birth Control Pills Counseling: Birth Control Pill Counseling: I discussed with the patient the potential side effects of OCPs including but not limited to increased risk of stroke, heart attack, thrombophlebitis, deep venous thrombosis, hepatic adenomas, breast changes, GI upset, headaches, and depression.  The patient verbalized understanding of the proper use and possible adverse effects of OCPs. All of the patient's questions and concerns were addressed.
Topical Sulfur Applications Pregnancy And Lactation Text: This medication is Pregnancy Category C and has an unknown safety profile during pregnancy. It is unknown if this topical medication is excreted in breast milk.
Topical Clindamycin Pregnancy And Lactation Text: This medication is Pregnancy Category B and is considered safe during pregnancy. It is unknown if it is excreted in breast milk.
High Dose Vitamin A Pregnancy And Lactation Text: High dose vitamin A therapy is contraindicated during pregnancy and breast feeding.
Doxycycline Pregnancy And Lactation Text: This medication is Pregnancy Category D and not consider safe during pregnancy. It is also excreted in breast milk but is considered safe for shorter treatment courses.
High Dose Vitamin A Counseling: Side effects reviewed, pt to contact office should one occur.
Benzoyl Peroxide Counseling: Patient counseled that medicine may cause skin irritation and bleach clothing.  In the event of skin irritation, the patient was advised to reduce the amount of the drug applied or use it less frequently.   The patient verbalized understanding of the proper use and possible adverse effects of benzoyl peroxide.  All of the patient's questions and concerns were addressed.
Topical Retinoid counseling:  Patient advised to apply a pea-sized amount only at bedtime and wait 30 minutes after washing their face before applying.  If too drying, patient may add a non-comedogenic moisturizer. The patient verbalized understanding of the proper use and possible adverse effects of retinoids.  All of the patient's questions and concerns were addressed.
Dapsone Pregnancy And Lactation Text: This medication is Pregnancy Category C and is not considered safe during pregnancy or breast feeding.
Topical Clindamycin Counseling: Patient counseled that this medication may cause skin irritation or allergic reactions.  In the event of skin irritation, the patient was advised to reduce the amount of the drug applied or use it less frequently.   The patient verbalized understanding of the proper use and possible adverse effects of clindamycin.  All of the patient's questions and concerns were addressed.
Birth Control Pills Pregnancy And Lactation Text: This medication should be avoided if pregnant and for the first 30 days post-partum.
Detail Level: Zone
Isotretinoin Counseling: Patient should get monthly blood tests, not donate blood, not drive at night if vision affected, not share medication, and not undergo elective surgery for 6 months after tx completed. Side effects reviewed, pt to contact office should one occur.
Erythromycin Pregnancy And Lactation Text: This medication is Pregnancy Category B and is considered safe during pregnancy. It is also excreted in breast milk.
Benzoyl Peroxide Pregnancy And Lactation Text: This medication is Pregnancy Category C. It is unknown if benzoyl peroxide is excreted in breast milk.
Sarecycline Counseling: Patient advised regarding possible photosensitivity and discoloration of the teeth, skin, lips, tongue and gums.  Patient instructed to avoid sunlight, if possible.  When exposed to sunlight, patients should wear protective clothing, sunglasses, and sunscreen.  The patient was instructed to call the office immediately if the following severe adverse effects occur:  hearing changes, easy bruising/bleeding, severe headache, or vision changes.  The patient verbalized understanding of the proper use and possible adverse effects of sarecycline.  All of the patient's questions and concerns were addressed.
Tetracycline Counseling: Patient counseled regarding possible photosensitivity and increased risk for sunburn.  Patient instructed to avoid sunlight, if possible.  When exposed to sunlight, patients should wear protective clothing, sunglasses, and sunscreen.  The patient was instructed to call the office immediately if the following severe adverse effects occur:  hearing changes, easy bruising/bleeding, severe headache, or vision changes.  The patient verbalized understanding of the proper use and possible adverse effects of tetracycline.  All of the patient's questions and concerns were addressed. Patient understands to avoid pregnancy while on therapy due to potential birth defects.
Include Pregnancy/Lactation Warning?: No
Topical Retinoid Pregnancy And Lactation Text: This medication is Pregnancy Category C. It is unknown if this medication is excreted in breast milk.
Erythromycin Counseling:  I discussed with the patient the risks of erythromycin including but not limited to GI upset, allergic reaction, drug rash, diarrhea, increase in liver enzymes, and yeast infections.
Topical Sulfur Applications Counseling: Topical Sulfur Counseling: Patient counseled that this medication may cause skin irritation or allergic reactions.  In the event of skin irritation, the patient was advised to reduce the amount of the drug applied or use it less frequently.   The patient verbalized understanding of the proper use and possible adverse effects of topical sulfur application.  All of the patient's questions and concerns were addressed.
Bactrim Pregnancy And Lactation Text: This medication is Pregnancy Category D and is known to cause fetal risk.  It is also excreted in breast milk.
Azithromycin Pregnancy And Lactation Text: This medication is considered safe during pregnancy and is also secreted in breast milk.
Bactrim Counseling:  I discussed with the patient the risks of sulfa antibiotics including but not limited to GI upset, allergic reaction, drug rash, diarrhea, dizziness, photosensitivity, and yeast infections.  Rarely, more serious reactions can occur including but not limited to aplastic anemia, agranulocytosis, methemoglobinemia, blood dyscrasias, liver or kidney failure, lung infiltrates or desquamative/blistering drug rashes.
Tazorac Counseling:  Patient advised that medication is irritating and drying.  Patient may need to apply sparingly and wash off after an hour before eventually leaving it on overnight.  The patient verbalized understanding of the proper use and possible adverse effects of tazorac.  All of the patient's questions and concerns were addressed.
Doxycycline Counseling:  Patient counseled regarding possible photosensitivity and increased risk for sunburn.  Patient instructed to avoid sunlight, if possible.  When exposed to sunlight, patients should wear protective clothing, sunglasses, and sunscreen.  The patient was instructed to call the office immediately if the following severe adverse effects occur:  hearing changes, easy bruising/bleeding, severe headache, or vision changes.  The patient verbalized understanding of the proper use and possible adverse effects of doxycycline.  All of the patient's questions and concerns were addressed.

## 2021-10-07 NOTE — HPI: SKIN LESIONS
How Severe Is Your Skin Lesion?: mild
Have Your Skin Lesions Been Treated?: not been treated
Is This A New Presentation, Or A Follow-Up?: Skin Lesions
Which Family Member (Optional)?: Grandfather
Additional History: Patient denies anything new or changing that she is aware of.

## 2022-02-15 ENCOUNTER — HOSPITAL ENCOUNTER (OUTPATIENT)
Dept: LAB | Age: 57
Discharge: HOME | End: 2022-02-15
Payer: COMMERCIAL

## 2022-02-15 DIAGNOSIS — I49.3 PVC (PREMATURE VENTRICULAR CONTRACTION): ICD-10-CM

## 2022-02-15 LAB
ANION GAP SERPL CALC-SCNC: 5 MMOL/L (ref 7–16)
BASOPHILS # BLD: 0.1 K/UL (ref 0–0.2)
BASOPHILS NFR BLD: 1 % (ref 0–2)
BUN SERPL-MCNC: 10 MG/DL (ref 6–23)
CALCIUM SERPL-MCNC: 9.4 MG/DL (ref 8.3–10.4)
CHLORIDE SERPL-SCNC: 111 MMOL/L (ref 98–107)
CO2 SERPL-SCNC: 25 MMOL/L (ref 21–32)
CREAT SERPL-MCNC: 0.7 MG/DL (ref 0.6–1)
DIFFERENTIAL METHOD BLD: NORMAL
EOSINOPHIL # BLD: 0.1 K/UL (ref 0–0.8)
EOSINOPHIL NFR BLD: 1 % (ref 0.5–7.8)
ERYTHROCYTE [DISTWIDTH] IN BLOOD BY AUTOMATED COUNT: 12.7 % (ref 11.9–14.6)
GLUCOSE SERPL-MCNC: 89 MG/DL (ref 65–100)
HCT VFR BLD AUTO: 43.7 % (ref 35.8–46.3)
HGB BLD-MCNC: 14.2 G/DL (ref 11.7–15.4)
IMM GRANULOCYTES # BLD AUTO: 0 K/UL (ref 0–0.5)
IMM GRANULOCYTES NFR BLD AUTO: 0 % (ref 0–5)
LYMPHOCYTES # BLD: 2 K/UL (ref 0.5–4.6)
LYMPHOCYTES NFR BLD: 26 % (ref 13–44)
MAGNESIUM SERPL-MCNC: 2.3 MG/DL (ref 1.8–2.4)
MCH RBC QN AUTO: 29.9 PG (ref 26.1–32.9)
MCHC RBC AUTO-ENTMCNC: 32.5 G/DL (ref 31.4–35)
MCV RBC AUTO: 92 FL (ref 79.6–97.8)
MONOCYTES # BLD: 0.7 K/UL (ref 0.1–1.3)
MONOCYTES NFR BLD: 9 % (ref 4–12)
NEUTS SEG # BLD: 4.7 K/UL (ref 1.7–8.2)
NEUTS SEG NFR BLD: 62 % (ref 43–78)
NRBC # BLD: 0 K/UL (ref 0–0.2)
PLATELET # BLD AUTO: 288 K/UL (ref 150–450)
PMV BLD AUTO: 10 FL (ref 9.4–12.3)
POTASSIUM SERPL-SCNC: 4 MMOL/L (ref 3.5–5.1)
RBC # BLD AUTO: 4.75 M/UL (ref 4.05–5.2)
SODIUM SERPL-SCNC: 141 MMOL/L (ref 136–145)
WBC # BLD AUTO: 7.6 K/UL (ref 4.3–11.1)

## 2022-02-15 PROCEDURE — 85025 COMPLETE CBC W/AUTO DIFF WBC: CPT

## 2022-02-15 PROCEDURE — 36415 COLL VENOUS BLD VENIPUNCTURE: CPT

## 2022-02-15 PROCEDURE — 83735 ASSAY OF MAGNESIUM: CPT

## 2022-02-15 PROCEDURE — 80048 BASIC METABOLIC PNL TOTAL CA: CPT

## 2022-02-17 ENCOUNTER — ANESTHESIA EVENT (OUTPATIENT)
Dept: CARDIAC CATH/INVASIVE PROCEDURES | Age: 57
End: 2022-02-17
Payer: COMMERCIAL

## 2022-02-18 ENCOUNTER — ANESTHESIA (OUTPATIENT)
Dept: CARDIAC CATH/INVASIVE PROCEDURES | Age: 57
End: 2022-02-18
Payer: COMMERCIAL

## 2022-02-18 ENCOUNTER — HOSPITAL ENCOUNTER (OUTPATIENT)
Age: 57
Setting detail: OBSERVATION
Discharge: HOME | End: 2022-02-19
Attending: INTERNAL MEDICINE | Admitting: INTERNAL MEDICINE
Payer: COMMERCIAL

## 2022-02-18 DIAGNOSIS — I49.3 PVC (PREMATURE VENTRICULAR CONTRACTION): ICD-10-CM

## 2022-02-18 PROBLEM — R07.9 CHEST PAIN: Status: ACTIVE | Noted: 2022-02-18

## 2022-02-18 LAB
ACT BLD: 357 SECS (ref 70–128)
HCG UR QL: NEGATIVE

## 2022-02-18 PROCEDURE — C1894 INTRO/SHEATH, NON-LASER: HCPCS | Performed by: INTERNAL MEDICINE

## 2022-02-18 PROCEDURE — 77030027107 HC PTCH EXT REF CARTO3 J&J -F: Performed by: INTERNAL MEDICINE

## 2022-02-18 PROCEDURE — C1733 CATH, EP, OTHR THAN COOL-TIP: HCPCS | Performed by: INTERNAL MEDICINE

## 2022-02-18 PROCEDURE — 74011250636 HC RX REV CODE- 250/636: Performed by: INTERNAL MEDICINE

## 2022-02-18 PROCEDURE — 81025 URINE PREGNANCY TEST: CPT

## 2022-02-18 PROCEDURE — 74011000250 HC RX REV CODE- 250: Performed by: INTERNAL MEDICINE

## 2022-02-18 PROCEDURE — 93621 COMP EP EVL L PAC&REC C SINS: CPT | Performed by: INTERNAL MEDICINE

## 2022-02-18 PROCEDURE — 93662 INTRACARDIAC ECG (ICE): CPT | Performed by: INTERNAL MEDICINE

## 2022-02-18 PROCEDURE — C1732 CATH, EP, DIAG/ABL, 3D/VECT: HCPCS | Performed by: INTERNAL MEDICINE

## 2022-02-18 PROCEDURE — C1760 CLOSURE DEV, VASC: HCPCS | Performed by: INTERNAL MEDICINE

## 2022-02-18 PROCEDURE — 93654 COMPRE EP EVAL TX VT: CPT | Performed by: INTERNAL MEDICINE

## 2022-02-18 PROCEDURE — G0378 HOSPITAL OBSERVATION PER HR: HCPCS

## 2022-02-18 PROCEDURE — 74011250637 HC RX REV CODE- 250/637: Performed by: INTERNAL MEDICINE

## 2022-02-18 PROCEDURE — 76060000035 HC ANESTHESIA 2 TO 2.5 HR: Performed by: INTERNAL MEDICINE

## 2022-02-18 PROCEDURE — 74011250636 HC RX REV CODE- 250/636: Performed by: NURSE ANESTHETIST, CERTIFIED REGISTERED

## 2022-02-18 PROCEDURE — 77030013687 HC GD NDL BARD -B: Performed by: INTERNAL MEDICINE

## 2022-02-18 PROCEDURE — C1730 CATH, EP, 19 OR FEW ELECT: HCPCS | Performed by: INTERNAL MEDICINE

## 2022-02-18 PROCEDURE — 74011250637 HC RX REV CODE- 250/637: Performed by: NURSE PRACTITIONER

## 2022-02-18 PROCEDURE — 93005 ELECTROCARDIOGRAM TRACING: CPT | Performed by: INTERNAL MEDICINE

## 2022-02-18 PROCEDURE — C1759 CATH, INTRA ECHOCARDIOGRAPHY: HCPCS | Performed by: INTERNAL MEDICINE

## 2022-02-18 PROCEDURE — 77030035291 HC TBNG PMP SMARTABLATE J&J -B: Performed by: INTERNAL MEDICINE

## 2022-02-18 PROCEDURE — 74011000250 HC RX REV CODE- 250: Performed by: ANESTHESIOLOGY

## 2022-02-18 PROCEDURE — 85347 COAGULATION TIME ACTIVATED: CPT

## 2022-02-18 RX ORDER — LOSARTAN POTASSIUM 25 MG/1
25 TABLET ORAL
Status: DISCONTINUED | OUTPATIENT
Start: 2022-02-18 | End: 2022-02-18

## 2022-02-18 RX ORDER — SODIUM CHLORIDE, SODIUM LACTATE, POTASSIUM CHLORIDE, CALCIUM CHLORIDE 600; 310; 30; 20 MG/100ML; MG/100ML; MG/100ML; MG/100ML
75 INJECTION, SOLUTION INTRAVENOUS CONTINUOUS
Status: DISCONTINUED | OUTPATIENT
Start: 2022-02-18 | End: 2022-02-19 | Stop reason: HOSPADM

## 2022-02-18 RX ORDER — SODIUM CHLORIDE 0.9 % (FLUSH) 0.9 %
5-40 SYRINGE (ML) INJECTION EVERY 8 HOURS
Status: DISCONTINUED | OUTPATIENT
Start: 2022-02-18 | End: 2022-02-19 | Stop reason: HOSPADM

## 2022-02-18 RX ORDER — HYDROCODONE BITARTRATE AND ACETAMINOPHEN 5; 325 MG/1; MG/1
1 TABLET ORAL
Status: DISCONTINUED | OUTPATIENT
Start: 2022-02-18 | End: 2022-02-18

## 2022-02-18 RX ORDER — ROSUVASTATIN CALCIUM 10 MG/1
10 TABLET, COATED ORAL
Status: DISCONTINUED | OUTPATIENT
Start: 2022-02-18 | End: 2022-02-19 | Stop reason: HOSPADM

## 2022-02-18 RX ORDER — PROPOFOL 10 MG/ML
INJECTION, EMULSION INTRAVENOUS
Status: DISCONTINUED | OUTPATIENT
Start: 2022-02-18 | End: 2022-02-18 | Stop reason: HOSPADM

## 2022-02-18 RX ORDER — ACETAMINOPHEN 325 MG/1
650 TABLET ORAL
Status: DISCONTINUED | OUTPATIENT
Start: 2022-02-18 | End: 2022-02-19 | Stop reason: HOSPADM

## 2022-02-18 RX ORDER — MIDAZOLAM HYDROCHLORIDE 1 MG/ML
2 INJECTION, SOLUTION INTRAMUSCULAR; INTRAVENOUS
Status: DISCONTINUED | OUTPATIENT
Start: 2022-02-18 | End: 2022-02-19 | Stop reason: HOSPADM

## 2022-02-18 RX ORDER — LOSARTAN POTASSIUM 25 MG/1
25 TABLET ORAL DAILY
Status: DISCONTINUED | OUTPATIENT
Start: 2022-02-18 | End: 2022-02-19 | Stop reason: HOSPADM

## 2022-02-18 RX ORDER — NALOXONE HYDROCHLORIDE 0.4 MG/ML
0.2 INJECTION, SOLUTION INTRAMUSCULAR; INTRAVENOUS; SUBCUTANEOUS AS NEEDED
Status: DISCONTINUED | OUTPATIENT
Start: 2022-02-18 | End: 2022-02-19 | Stop reason: HOSPADM

## 2022-02-18 RX ORDER — ESCITALOPRAM OXALATE 10 MG/1
10 TABLET ORAL
Status: DISCONTINUED | OUTPATIENT
Start: 2022-02-18 | End: 2022-02-19 | Stop reason: HOSPADM

## 2022-02-18 RX ORDER — SODIUM CHLORIDE 0.9 % (FLUSH) 0.9 %
5-40 SYRINGE (ML) INJECTION AS NEEDED
Status: DISCONTINUED | OUTPATIENT
Start: 2022-02-18 | End: 2022-02-19 | Stop reason: HOSPADM

## 2022-02-18 RX ORDER — HYDROMORPHONE HYDROCHLORIDE 2 MG/ML
0.5 INJECTION, SOLUTION INTRAMUSCULAR; INTRAVENOUS; SUBCUTANEOUS
Status: DISCONTINUED | OUTPATIENT
Start: 2022-02-18 | End: 2022-02-19 | Stop reason: HOSPADM

## 2022-02-18 RX ORDER — OXYCODONE AND ACETAMINOPHEN 5; 325 MG/1; MG/1
1 TABLET ORAL AS NEEDED
Status: DISCONTINUED | OUTPATIENT
Start: 2022-02-18 | End: 2022-02-19 | Stop reason: HOSPADM

## 2022-02-18 RX ORDER — PROPOFOL 10 MG/ML
INJECTION, EMULSION INTRAVENOUS AS NEEDED
Status: DISCONTINUED | OUTPATIENT
Start: 2022-02-18 | End: 2022-02-18 | Stop reason: HOSPADM

## 2022-02-18 RX ORDER — SODIUM CHLORIDE, SODIUM LACTATE, POTASSIUM CHLORIDE, CALCIUM CHLORIDE 600; 310; 30; 20 MG/100ML; MG/100ML; MG/100ML; MG/100ML
75 INJECTION, SOLUTION INTRAVENOUS CONTINUOUS
Status: DISCONTINUED | OUTPATIENT
Start: 2022-02-18 | End: 2022-02-18

## 2022-02-18 RX ORDER — BUPROPION HYDROCHLORIDE 150 MG/1
150 TABLET, EXTENDED RELEASE ORAL 2 TIMES DAILY
Status: DISCONTINUED | OUTPATIENT
Start: 2022-02-18 | End: 2022-02-19 | Stop reason: HOSPADM

## 2022-02-18 RX ORDER — HYDROCODONE BITARTRATE AND ACETAMINOPHEN 5; 325 MG/1; MG/1
1 TABLET ORAL
Status: DISCONTINUED | OUTPATIENT
Start: 2022-02-18 | End: 2022-02-19 | Stop reason: HOSPADM

## 2022-02-18 RX ORDER — HYDRALAZINE HYDROCHLORIDE 20 MG/ML
10 INJECTION INTRAMUSCULAR; INTRAVENOUS
Status: DISCONTINUED | OUTPATIENT
Start: 2022-02-18 | End: 2022-02-19 | Stop reason: HOSPADM

## 2022-02-18 RX ORDER — HEPARIN SODIUM 1000 [USP'U]/ML
INJECTION, SOLUTION INTRAVENOUS; SUBCUTANEOUS AS NEEDED
Status: DISCONTINUED | OUTPATIENT
Start: 2022-02-18 | End: 2022-02-18 | Stop reason: HOSPADM

## 2022-02-18 RX ORDER — LIDOCAINE HYDROCHLORIDE 10 MG/ML
0.1 INJECTION INFILTRATION; PERINEURAL AS NEEDED
Status: DISCONTINUED | OUTPATIENT
Start: 2022-02-18 | End: 2022-02-19 | Stop reason: HOSPADM

## 2022-02-18 RX ORDER — VERAPAMIL HYDROCHLORIDE 180 MG/1
180 TABLET, EXTENDED RELEASE ORAL
Status: DISCONTINUED | OUTPATIENT
Start: 2022-02-18 | End: 2022-02-19 | Stop reason: HOSPADM

## 2022-02-18 RX ORDER — MONTELUKAST SODIUM 10 MG/1
10 TABLET ORAL
Status: DISCONTINUED | OUTPATIENT
Start: 2022-02-18 | End: 2022-02-19 | Stop reason: HOSPADM

## 2022-02-18 RX ADMIN — LOSARTAN POTASSIUM 25 MG: 25 TABLET, FILM COATED ORAL at 13:48

## 2022-02-18 RX ADMIN — BUPROPION HYDROCHLORIDE 150 MG: 150 TABLET, EXTENDED RELEASE ORAL at 17:44

## 2022-02-18 RX ADMIN — MONTELUKAST 10 MG: 10 TABLET, FILM COATED ORAL at 22:22

## 2022-02-18 RX ADMIN — HYDROCODONE BITARTRATE AND ACETAMINOPHEN 1 TABLET: 5; 325 TABLET ORAL at 10:38

## 2022-02-18 RX ADMIN — ROSUVASTATIN 10 MG: 10 TABLET, FILM COATED ORAL at 22:22

## 2022-02-18 RX ADMIN — PROPOFOL 20 MG: 10 INJECTION, EMULSION INTRAVENOUS at 07:30

## 2022-02-18 RX ADMIN — SODIUM CHLORIDE, PRESERVATIVE FREE 10 ML: 5 INJECTION INTRAVENOUS at 22:24

## 2022-02-18 RX ADMIN — PROPOFOL 30 MG: 10 INJECTION, EMULSION INTRAVENOUS at 07:51

## 2022-02-18 RX ADMIN — PROPOFOL 20 MG: 10 INJECTION, EMULSION INTRAVENOUS at 07:32

## 2022-02-18 RX ADMIN — HEPARIN SODIUM 15000 UNITS: 1000 INJECTION INTRAVENOUS; SUBCUTANEOUS at 08:50

## 2022-02-18 RX ADMIN — VERAPAMIL HYDROCHLORIDE 180 MG: 180 TABLET, FILM COATED, EXTENDED RELEASE ORAL at 22:22

## 2022-02-18 RX ADMIN — PROPOFOL 40 MG: 10 INJECTION, EMULSION INTRAVENOUS at 07:29

## 2022-02-18 RX ADMIN — SODIUM CHLORIDE, PRESERVATIVE FREE 10 ML: 5 INJECTION INTRAVENOUS at 13:36

## 2022-02-18 RX ADMIN — APIXABAN 5 MG: 5 TABLET, FILM COATED ORAL at 20:18

## 2022-02-18 RX ADMIN — ESCITALOPRAM OXALATE 10 MG: 10 TABLET ORAL at 22:22

## 2022-02-18 RX ADMIN — SODIUM CHLORIDE, PRESERVATIVE FREE 10 ML: 5 INJECTION INTRAVENOUS at 22:23

## 2022-02-18 RX ADMIN — PROPOFOL 160 MCG/KG/MIN: 10 INJECTION, EMULSION INTRAVENOUS at 07:29

## 2022-02-18 RX ADMIN — SODIUM CHLORIDE, SODIUM LACTATE, POTASSIUM CHLORIDE, AND CALCIUM CHLORIDE: 600; 310; 30; 20 INJECTION, SOLUTION INTRAVENOUS at 07:22

## 2022-02-18 RX ADMIN — PROPOFOL 20 MG: 10 INJECTION, EMULSION INTRAVENOUS at 07:42

## 2022-02-18 NOTE — ANESTHESIA POSTPROCEDURE EVALUATION
Procedure(s):  ABLATION PVC.     general    Anesthesia Post Evaluation      Multimodal analgesia: multimodal analgesia used between 6 hours prior to anesthesia start to PACU discharge  Patient location during evaluation: PACU  Patient participation: complete - patient participated  Level of consciousness: awake and alert  Pain management: adequate  Airway patency: patent  Anesthetic complications: no  Cardiovascular status: acceptable  Respiratory status: acceptable  Hydration status: acceptable  Post anesthesia nausea and vomiting:  none  Final Post Anesthesia Temperature Assessment:  Normothermia (36.0-37.5 degrees C)      INITIAL Post-op Vital signs:   Vitals Value Taken Time   /83 02/18/22 1202   Temp 36.8 °C (98.3 °F) 02/18/22 1202   Pulse 89 02/18/22 1202   Resp 16 02/18/22 1202   SpO2 98 % 02/18/22 1202

## 2022-02-18 NOTE — PROCEDURES
Pre-Electrophysiology Diagnosis  1. Premature ventricular contractions     Procedure Performed  1. Comprehensive EP study. 2. Left atrial pacing recording from the coronary sinus. 3. 3-D Electroanatomical mapping. 4. Ablation of ventricular tachycardia focus. Anesthesia: MAC     Estimated Blood Loss: Less than 10 mL     Procedure in Detail:  The patient was brought to the electrophysiology lab in the fasting state. A Ref-Star CARTO patch was placed, the patient was then prepped and draped in sterile fashion. Venous access was then obtained x 3 using a modified Seldinger technique under ultrasound guidance with placement of 2 8Fr short sidearm sheaths into the right femoral vein and a 10 Fr short side arm sheath into the left femoral vein. A CARTO Accunav/Seek & Adoretar intracardiac echo catheter was inserted via the 10Fr short LFV sheath, and advanced to the mid RA and mapping of the body of the right ventricle, RVOT, left ventricle, and LVOT was performed including creation of LV cusp anatomy and left and right coronary ostium definition as well as papillary muscle morphology. A Pentaray catheter was then inserted and used to create an electroanatomical map of the RA, coronary sinus, and His location. A [employer] Evans CS catheter was then placed in the mid body of the CS and used to record LA and LV electrograms and to pace the CS during the EP study. The pentaray was used to create an activation map of the PVC from the RV. The pentaray was then used to create an activation and anatomical map of the PVC. Activation mapping confirmed the PVC was originating from the anteroseptal RVOT. Activation was about 30 msec pre QRS and pace mapping produced a 96-97% pace match. An 8 Fr, 3.5mm tip saline irrigated bidirectional D/F curve Smart Touch Bio[employer] Evans ablation catheter was then inserted and used for confirmation of activation and pace mapping. Activation remained early at this location. Ablation was performed at this location and the PVC terminated. Several consolidation lesions were placed. After a waiting period, a second PVC started and was mapped in the RVOT and was clearly originating from the LVOT. The decision was made to place an arterial sheath and plan for LVOT mapping. However, prior to accessing the femoral artery, a large thrombus was noted in the RA and appeared to be attached to the CS catheter. The patient was given a heparin bolus, the ACT was > 300, and then the CS catheter was removed. Pt remained hemodynamically stable, but the decision was made to stop at this point. RVOT PVC Activation Map      Baseline Intervals    QRS duration: 114 msec  WA interval: 208 msec  RR interval: 714 msec  AH interval: 97 msec  HV interval: 45 msec    EP Study    AV Wenchebach: 300 msec  AV cinthia ERP: less than or equal to 210 msec  Atrial ERP: 210 msec    Three VASCADE MVP devices were used to close the venotomy sites. The MVP tools were advanced into the sheath; the sheaths were then removed. The collagen was then deployed and a 30 second dwell time was performed to allow for expansion of the collagen. The delivery tools were then removed with adequate hemostasis. The patient tolerated the procedure well with no acute complications recognized. Plan of care: The patient will be placed in observation on telemetry, 4 hour flat time, followed by ambulation as tolerated and will continue anticoagulation as prescribed pre-procedure. Summary:   1. Successful ablation of RVOT PVC. 2. LVOT PVC mapping and ablation was aborted as noted above  3. Comprehensive EP study . 4. Pt tolerated the procedure well. Colin Darling MD, MS  Clinical Cardiac Electrophysiology  2/18/2022  9:15 AM

## 2022-02-18 NOTE — PROGRESS NOTES
Problem: Falls - Risk of  Goal: *Absence of Falls  Description: Document Kathleen Abisai Fall Risk and appropriate interventions in the flowsheet.   Outcome: Progressing Towards Goal  Note: Fall Risk Interventions:            Medication Interventions: Assess postural VS orthostatic hypotension,Evaluate medications/consider consulting pharmacy,Patient to call before getting OOB,Teach patient to arise slowly         History of Falls Interventions: Door open when patient unattended,Evaluate medications/consider consulting pharmacy

## 2022-02-18 NOTE — PROGRESS NOTES
TRANSFER - OUT REPORT:    Verbal report given to Liliya(name) on [employer]  being transferred to [facility](unit) for routine post - op       Report consisted of patients Situation, Background, Assessment and   Recommendations(SBAR). Information from the following report(s) SBAR, Procedure Summary, Intake/Output, MAR and Cardiac Rhythm NSR was reviewed with the receiving nurse. Lines:   Peripheral IV 02/18/22 Anterior;Left;Proximal Forearm (Active)   Site Assessment Clean, dry, & intact; Clean;Dry 02/18/22 1112   Phlebitis Assessment 0 02/18/22 1112   Infiltration Assessment 0 02/18/22 1112   Dressing Status Clean, dry, & intact; Clean;Dry 02/18/22 1112   Dressing Type Tape;Transparent 02/18/22 1112   Hub Color/Line Status Patent; Infusing;Pink 02/18/22 1112       Peripheral IV 02/18/22 Anterior;Proximal;Right Forearm (Active)   Site Assessment Clean, dry, & intact; Clean;Dry 02/18/22 1112   Phlebitis Assessment 0 02/18/22 1112   Infiltration Assessment 0 02/18/22 1112   Dressing Status Clean, dry, & intact; Clean;Dry 02/18/22 1112   Dressing Type Tape;Transparent 02/18/22 1112   Hub Color/Line Status Patent;Capped;Pink 02/18/22 1112        Opportunity for questions and clarification was provided.

## 2022-02-18 NOTE — H&P
[facility] Cardiology/Electrophyiology Consult                Date of  Admission: 2/18/2022  5:46 AM     CC/Reason for admission: PVC ablation     Pemberton Call is a 49 y.o. female admitted for PVC (premature ventricular contraction) [I49.3]. 49 y.o. female with a past medical and cardiac history significant for minimal CAD, PVCs, HTN, HLD and presents for scheduled PVC ablation. She has been feeling fatigued, low energy, and SOB over the past few months. She was noted to have frequent PVCs, has occasional palpitations, mostly at night. She has had work up as noted below and presents to discuss management of PVCs. Cardiac PMH: (Old records have been reviewed and summarized below)  TTE (12/17/21): EF 55%  Monitor (12/21): 22.55% PVCs  Cath (6/20): mild to moderate CAD       Patient Active Problem List   Diagnosis Code    HTN (hypertension) I10    Esophagitis K20.90    Malaise and fatigue R53.81, R53.83    Allergic rhinitis J30.9    Dyslipidemia E78.5    Acute pulmonary embolism (HCC) I26.99    Endometriosis N80.9    Acute deep vein thrombosis (DVT) of popliteal vein of right lower extremity (HCC) I82.431    LACHELLE (obstructive sleep apnea) G47.33    Dyspnea R06.00    Hypoxemia R09.02    Obesity (BMI 30-39. 9) E66.9    PVC (premature ventricular contraction) I49.3    CAD (coronary artery disease) I25.10    Hyperlipidemia E78.5    Hypertension I10    History of pulmonary embolus (PE) Z86.711       Past Medical History:   Diagnosis Date    CAD (coronary artery disease)     follows with Cardiology    Gallstones 12/14/12    GERD (gastroesophageal reflux disease)     History of DVT (deep vein thrombosis)     History of pulmonary embolus (PE)     Hyperlipidemia     Hypertension     controlled with meds    LACHELLE (obstructive sleep apnea)     Mouth piece    PVC (premature ventricular contraction)     Thromboembolus (HCC)     PE & DVT      Past Surgical History:   Procedure Laterality Date    HX BREAST LUMPECTOMY      benign    HX GYN      laparoscopies for endometriosis- x 4    HX HEART CATHETERIZATION      HX LAP CHOLECYSTECTOMY       No Known Allergies   Family History   Problem Relation Age of Onset    Hypertension Mother     Diabetes Father     Other Maternal Grandfather         lymphoma    Other Paternal Grandfather         brain tumor    Pulmonary Embolism Paternal Grandfather     Pulmonary Embolism Other         Current Facility-Administered Medications   Medication Dose Route Frequency    lactated Ringers infusion  75 mL/hr IntraVENous CONTINUOUS       Review of Symptoms:  A comprehensive ROS was performed with the pertinent positives and negatives mentioned in the HPI, all other systems reviewed and are negative       Physical Exam  Vitals:    02/18/22 0650   BP: (!) 149/90   Pulse: 88   Temp: 98.1 °F (36.7 °C)   SpO2: 95%   Weight: 198 lb (89.8 kg)   Height: 5' 4\" (1.626 m)       Physical Exam:     Gen: well appearing, well developed, NAD  CV: RRR, no M/R/G, normal JVD, normal distal pulses, no OSCAR  Pulm: CTAB, no accessory muscle uses, no wheezes, crackles  GI: soft, NT, ND      Cardiographics    Telemetry:   ECG (Indpendently visualized and interpreted):  Echocardiogram:     Labs:   Recent Labs     02/15/22  0849      K 4.0   MG 2.3   BUN 10   CREA 0.70   GLU 89   WBC 7.6   HGB 14.2   HCT 43.7           Assessment:      Principal Problem:    PVC (premature ventricular contraction) (9/22/2020)           Plan:   1. PVCs, uncontrolled: I had a discussion with the patient today regarding options for management of PVCs. I explained that the main reason to treat PVCs are symptoms driven. Options include medications vs catheter ablation. I also discussed today at length the option of a PVC ablation.  I discussed the advantages and disadvantages of all treatment options including the risk and complications of cardiac ablation including femoral vascular access complications, damage to the heart tissue, bleeding around the heart, the need for emergent open heart surgery, heart attack, stroke, possible need for pacemaker post procedure, or even death. Pt would like to proceed with ablation. --EPS and PVC ablation    Kit Darling MD, MS  Cardiology/Electrophysiology

## 2022-02-18 NOTE — ROUTINE PROCESS
Bedside and Verbal shift change report to be given to [place], RN (oncoming nurse) by self (offgoing nurse). Report included the following information SBAR, Kardex, Intake/Output, MAR and Recent Results.

## 2022-02-18 NOTE — PROGRESS NOTES
Patient's BP high, 313'I systolic. Patient with slight headache. No BP medications due until 2200. OK to retime losartan for now per Abby Stephensr, NP. Will give dose and continue to monitor. Update @ 1640: BP still 160's/90's. Abby Courser, NP notified. Order received for 10mg IV Hydralazine Q6 PRN for SBP > 160.     1730: BP rechecked; 145/90. Hydralazine not given yet. Will continue to monitor.

## 2022-02-18 NOTE — ANESTHESIA PREPROCEDURE EVALUATION
Relevant Problems   RESPIRATORY SYSTEM   (+) Dyspnea   (+) LACHELLE (obstructive sleep apnea)      CARDIOVASCULAR   (+) CAD (coronary artery disease)   (+) HTN (hypertension)   (+) Hypertension   (+) PVC (premature ventricular contraction)       Anesthetic History   No history of anesthetic complications            Review of Systems / Medical History  Patient summary reviewed and pertinent labs reviewed    Pulmonary        Sleep apnea (Uses mouth guard)  Smoker (Quit ten years ago)         Neuro/Psych         Headaches (H/O vascular headache)     Cardiovascular    Hypertension: well controlled        Dysrhythmias : PVC  Hyperlipidemia    Exercise tolerance: >4 METS  Comments:   Left Ventricle: Left ventricle size is normal. Normal wall thickness. Normal wall motion. Normal left ventricular systolic function with a visually estimated EF of 55 - 60%. Diastolic dysfunction present with normal LV EF.   Mitral Valve: Mild mitral annular calcification. Mild transvalvular regurgitation.   Left Atrium: Left atrium is mildly dilated. LA Vol Index is  23 ml/m2.      GI/Hepatic/Renal     GERD: well controlled      Hiatal hernia     Endo/Other             Other Findings   Comments: H/O DVT/PE, was on Eliquis         Physical Exam    Airway  Mallampati: II  TM Distance: 4 - 6 cm  Neck ROM: normal range of motion   Mouth opening: Normal     Cardiovascular    Rhythm: regular  Rate: normal         Dental  No notable dental hx       Pulmonary  Breath sounds clear to auscultation               Abdominal  GI exam deferred       Other Findings            Anesthetic Plan    ASA: 2  Anesthesia type: general          Induction: Intravenous  Anesthetic plan and risks discussed with: Patient and Spouse

## 2022-02-18 NOTE — PROGRESS NOTES
TRANSFER - IN REPORT:    Verbal report received from Gale Chavez RN on [employer] being received from [facility] for routine progression of care. Report consisted of patients Situation, Background, Assessment and Recommendations(SBAR). Information from the following report(s) SBAR, Kardex, Procedure Summary, Intake/Output, MAR and Recent Results was reviewed with the receiving nurse. Opportunity for questions and clarification was provided. Assessment completed upon patients arrival to unit and care assumed.

## 2022-02-18 NOTE — PROGRESS NOTES
Patient received to [address] [place] [address] room # 9  Ambulatory from [facility]. Patient scheduled for PVC ablation today with Dr Bradley Balnton. Procedure reviewed & questions answered, voiced good understanding consent obtained & placed on chart. All medications and medical history reviewed. Will prep patient per orders. Patient & family updated on plan of care. The patient has a fraility score of 3-MANAGING WELL, based on ability to ambulate to room unassisted.

## 2022-02-18 NOTE — PROGRESS NOTES
Skin assessment completed with second RN. Skin is overall warm, dry, and intact. Scattered scars present but sacrum and heels are intact and without signs of breakdown. R and L groin sites noted s/p PVC ablation and they are CDI, dressed with gauze and tegaderm. Encouraged repositioning to prevent any future skin breakdown.

## 2022-02-19 ENCOUNTER — TELEPHONE (OUTPATIENT)
Dept: CARDIOLOGY | Age: 57
End: 2022-02-19

## 2022-02-19 VITALS
SYSTOLIC BLOOD PRESSURE: 132 MMHG | OXYGEN SATURATION: 95 % | HEART RATE: 75 BPM | DIASTOLIC BLOOD PRESSURE: 89 MMHG | RESPIRATION RATE: 18 BRPM | TEMPERATURE: 98.2 F | BODY MASS INDEX: 33.79 KG/M2 | WEIGHT: 197.9 LBS | HEIGHT: 64 IN

## 2022-02-19 LAB
ANION GAP SERPL CALC-SCNC: 6 MMOL/L (ref 7–16)
ATRIAL RATE: 78 BPM
BASOPHILS # BLD: 0.1 K/UL (ref 0–0.2)
BASOPHILS NFR BLD: 1 % (ref 0–2)
BUN SERPL-MCNC: 10 MG/DL (ref 6–23)
CALCIUM SERPL-MCNC: 8.7 MG/DL (ref 8.3–10.4)
CALCULATED P AXIS, ECG09: 70 DEGREES
CALCULATED R AXIS, ECG10: 16 DEGREES
CALCULATED T AXIS, ECG11: 76 DEGREES
CHLORIDE SERPL-SCNC: 112 MMOL/L (ref 98–107)
CO2 SERPL-SCNC: 26 MMOL/L (ref 21–32)
CREAT SERPL-MCNC: 0.7 MG/DL (ref 0.6–1)
DIAGNOSIS, 93000: NORMAL
DIFFERENTIAL METHOD BLD: NORMAL
EOSINOPHIL # BLD: 0.1 K/UL (ref 0–0.8)
EOSINOPHIL NFR BLD: 1 % (ref 0.5–7.8)
ERYTHROCYTE [DISTWIDTH] IN BLOOD BY AUTOMATED COUNT: 12.8 % (ref 11.9–14.6)
GLUCOSE SERPL-MCNC: 86 MG/DL (ref 65–100)
HCT VFR BLD AUTO: 41.7 % (ref 35.8–46.3)
HGB BLD-MCNC: 13.6 G/DL (ref 11.7–15.4)
IMM GRANULOCYTES # BLD AUTO: 0 K/UL (ref 0–0.5)
IMM GRANULOCYTES NFR BLD AUTO: 0 % (ref 0–5)
LYMPHOCYTES # BLD: 2.3 K/UL (ref 0.5–4.6)
LYMPHOCYTES NFR BLD: 28 % (ref 13–44)
MAGNESIUM SERPL-MCNC: 2.2 MG/DL (ref 1.8–2.4)
MCH RBC QN AUTO: 29.5 PG (ref 26.1–32.9)
MCHC RBC AUTO-ENTMCNC: 32.6 G/DL (ref 31.4–35)
MCV RBC AUTO: 90.5 FL (ref 79.6–97.8)
MONOCYTES # BLD: 0.8 K/UL (ref 0.1–1.3)
MONOCYTES NFR BLD: 10 % (ref 4–12)
NEUTS SEG # BLD: 4.8 K/UL (ref 1.7–8.2)
NEUTS SEG NFR BLD: 59 % (ref 43–78)
NRBC # BLD: 0 K/UL (ref 0–0.2)
P-R INTERVAL, ECG05: 168 MS
PLATELET # BLD AUTO: 257 K/UL (ref 150–450)
PMV BLD AUTO: 9.5 FL (ref 9.4–12.3)
POTASSIUM SERPL-SCNC: 3.9 MMOL/L (ref 3.5–5.1)
Q-T INTERVAL, ECG07: 460 MS
QRS DURATION, ECG06: 84 MS
QTC CALCULATION (BEZET), ECG08: 524 MS
RBC # BLD AUTO: 4.61 M/UL (ref 4.05–5.2)
SODIUM SERPL-SCNC: 144 MMOL/L (ref 136–145)
VENTRICULAR RATE, ECG03: 78 BPM
WBC # BLD AUTO: 8.1 K/UL (ref 4.3–11.1)

## 2022-02-19 PROCEDURE — 85025 COMPLETE CBC W/AUTO DIFF WBC: CPT

## 2022-02-19 PROCEDURE — 80048 BASIC METABOLIC PNL TOTAL CA: CPT

## 2022-02-19 PROCEDURE — 99217 PR OBSERVATION CARE DISCHARGE MANAGEMENT: CPT | Performed by: INTERNAL MEDICINE

## 2022-02-19 PROCEDURE — 36415 COLL VENOUS BLD VENIPUNCTURE: CPT

## 2022-02-19 PROCEDURE — 74011250637 HC RX REV CODE- 250/637: Performed by: INTERNAL MEDICINE

## 2022-02-19 PROCEDURE — G0378 HOSPITAL OBSERVATION PER HR: HCPCS

## 2022-02-19 PROCEDURE — 74011250637 HC RX REV CODE- 250/637: Performed by: NURSE PRACTITIONER

## 2022-02-19 PROCEDURE — 93005 ELECTROCARDIOGRAM TRACING: CPT | Performed by: INTERNAL MEDICINE

## 2022-02-19 PROCEDURE — 83735 ASSAY OF MAGNESIUM: CPT

## 2022-02-19 RX ADMIN — APIXABAN 5 MG: 5 TABLET, FILM COATED ORAL at 09:17

## 2022-02-19 RX ADMIN — BUPROPION HYDROCHLORIDE 150 MG: 150 TABLET, EXTENDED RELEASE ORAL at 09:17

## 2022-02-19 RX ADMIN — LOSARTAN POTASSIUM 25 MG: 25 TABLET, FILM COATED ORAL at 09:17

## 2022-02-19 NOTE — PROGRESS NOTES
Discharge instructions reviewed with patient and spouse. Prescriptions given for new medications and med info sheets provided for all new medications. Opportunity for questions provided. Patient voiced understanding of all discharge instructions. IV's and tele box removed. Bilateral groin sites CDI. No needs stated at this time. *E-sign not working.

## 2022-02-19 NOTE — PROGRESS NOTES
Bedside and Verbal shift change report given to self (oncoming nurse) by Luis Vera RN (offgoing nurse). Report included the following information SBAR, Kardex, Procedure Summary, Intake/Output, MAR and Recent Results.

## 2022-02-19 NOTE — PROGRESS NOTES
Problem: Patient Education: Go to Patient Education Activity  Goal: Patient/Family Education  Outcome: Progressing Towards Goal     Problem: Patient Education: Go to Patient Education Activity  Goal: Patient/Family Education  Outcome: Progressing Towards Goal     Problem: Falls - Risk of  Goal: *Absence of Falls  Description: Document Gaston Porras Fall Risk and appropriate interventions in the flowsheet.   Outcome: Progressing Towards Goal  Note: Fall Risk Interventions:            Medication Interventions: Teach patient to arise slowly,Patient to call before getting OOB         History of Falls Interventions: Door open when patient unattended,Evaluate medications/consider consulting pharmacy         Problem: Patient Education: Go to Patient Education Activity  Goal: Patient/Family Education  Outcome: Progressing Towards Goal

## 2022-02-19 NOTE — PROGRESS NOTES
Problem: Falls - Risk of  Goal: *Absence of Falls  Description: Document Abdon Ramos Fall Risk and appropriate interventions in the flowsheet.   Outcome: Progressing Towards Goal  Note: Fall Risk Interventions:            Medication Interventions: Assess postural VS orthostatic hypotension,Evaluate medications/consider consulting pharmacy,Patient to call before getting OOB,Teach patient to arise slowly         History of Falls Interventions: Door open when patient unattended,Evaluate medications/consider consulting pharmacy

## 2022-02-19 NOTE — ROUTINE PROCESS
Bedside and Verbal shift change report received from [address]. Report included the following information SBAR, Kardex, Intake/Output, MAR and Recent Results.

## 2022-02-19 NOTE — DISCHARGE INSTRUCTIONS
Patient Education      DISPOSITION: The patient is being discharged home in stable condition on a low saturated fat, low cholesterol and low salt diet. The patient is instructed to advance activities as tolerated to the limit of fatigue or shortness of breath. The patient is instructed to call the office or return to the ER for immediate evaluation for any severe shortness of breath, chest pain, prolonged palpitations, near syncope or syncope.     Electrophysiology Study and Catheter Ablation: What to Expect at [address] [place] [address] Drive had an electrophysiology study for a problem with your heartbeat. You may also have had a catheter ablation to try to correct the problem. You may have swelling, bruising, or a small lump around the site where the catheters went into your body. These should go away in 3 to 4 weeks. Do not exercise hard or lift anything heavy for a week. You may be able to go back to work and to your normal routine in 1 or 2 days. This care sheet gives you a general idea about how long it will take for you to recover. But each person recovers at a different pace. Follow the steps below to get better as quickly as possible. How can you care for yourself at home? Activity    · For 1 week, do not lift anything that would make you strain. This may include heavy grocery bags and milk containers, a heavy briefcase or backpack, cat litter or dog food bags, a vacuum , or a child.     · For 1 week, do not exercise hard or do any activity that could strain your blood vessels or the site where the catheters went into your body.     · Ask your doctor when it is okay to have sex. Diet    · You can eat your normal diet. If your stomach is upset, try bland, low-fat foods like plain rice, broiled chicken, toast, and yogurt.     · Drink plenty of fluids (unless your doctor tells you not to). Medicines    · Your doctor will tell you if and when you can restart your medicines.  He or she will also give you instructions about taking any new medicines.     · If you take aspirin or some other blood thinner, ask your doctor if and when to start taking it again. Make sure that you understand exactly what your doctor wants you to do.     · Ask your doctor if you can take acetaminophen (Tylenol) for pain. Do not take aspirin for 3 days, unless your doctor says it is okay.     · Check with your doctor before you take aspirin or anti-inflammatory medicines to reduce pain and swelling. These include ibuprofen (Advil, Motrin) and naproxen (Aleve).   · Make sure you know which heart medicines to continue and which ones to stop. Ask your doctor if you aren't sure. Catheter site care    · You can remove your bandages the day after the procedure.     · You may shower 24 to 48 hours after the procedure, if your doctor okays it. Pat the incision dry.     · Do not soak the catheter site until it is healed. Don't take a bath for 1 week, or until your doctor tells you it is okay.     · Watch for bleeding from the site. A small amount of blood (up to the size of a quarter) on the bandage can be normal.     · If you are bleeding, lie down and press on the area for 15 minutes to try to make it stop. If the bleeding does not stop, call your doctor or seek immediate medical care. Follow-up care is a key part of your treatment and safety. Be sure to make and go to all appointments, and call your doctor if you are having problems. It's also a good idea to know your test results and keep a list of the medicines you take. When should you call for help? Call 911  anytime you think you may need emergency care. For example, call if:    · You passed out (lost consciousness).     · You have symptoms of a heart attack. These may include:  ? Chest pain or pressure, or a strange feeling in the chest.  ? Sweating. ? Shortness of breath. ? Nausea or vomiting.   ? Pain, pressure, or a strange feeling in the back, neck, jaw, or upper belly, or in one or both shoulders or arms. ? Lightheadedness or sudden weakness. ? A fast or irregular heartbeat. After you call 911, the  may tell you to chew 1 adult-strength or 2 to 4 low-dose aspirin. Wait for an ambulance. Do not try to drive yourself.     · You have symptoms of a stroke. These may include:  ? Sudden numbness, tingling, weakness, or loss of movement in your face, arm, or leg, especially on only one side of your body. ? Sudden vision changes. ? Sudden trouble speaking. ? Sudden confusion or trouble understanding simple statements. ? Sudden problems with walking or balance. ? A sudden, severe headache that is different from past headaches. Call your doctor now or seek immediate medical care if:    · You are bleeding from the area where the catheter was put in your blood vessel.     · You have a fast-growing, painful lump at the catheter site.     · You have signs of infection, such as:  ? Increased pain, swelling, warmth, or redness. ? Red streaks leading from the catheter site. ? Pus draining from the catheter site. ? A fever.     · Your leg, arm, or hand is painful, looks blue, or feels cold, numb, or tingly. Watch closely for any changes in your health, and be sure to contact your doctor if you have any problems. Where can you learn more? Go to http://www.gray.com/  Enter H580 in the search box to learn more about \"Electrophysiology Study and Catheter Ablation: What to Expect at Home. \"  Current as of: April 29, 2021               Content Version: 13.0  © 0884-7054 [employer]. Care instructions adapted under license by [employer] (which disclaims liability or warranty for this information). If you have questions about a medical condition or this instruction, always ask your healthcare professional. [place] [address] any warranty or liability for your use of this information.

## 2022-02-19 NOTE — PROGRESS NOTES
Problem: Moderate Sedation (Adult)  Goal: *Patent airway  Outcome: Resolved/Met  Goal: *Adequate oxygenation  Outcome: Resolved/Met  Goal: *Absence of aspiration  Outcome: Resolved/Met  Goal: *Hemodynamically stable  Outcome: Resolved/Met  Goal: *Optimal pain control at patient's stated goal  Outcome: Resolved/Met  Goal: *Absence of nausea/vomiting  Outcome: Resolved/Met  Goal: *Anxiety reduced or absent  Outcome: Resolved/Met  Goal: *Absence of injury  Outcome: Resolved/Met  Goal: *Level of consciousness returns to baseline  Outcome: Resolved/Met  Goal: Interventions  Outcome: Resolved/Met     Problem: Patient Education: Go to Patient Education Activity  Goal: Patient/Family Education  Outcome: Resolved/Met     Problem: Arrhythmia Pathway (Adult)  Goal: *Absence of arrhythmia  Outcome: Resolved/Met     Problem: Patient Education: Go to Patient Education Activity  Goal: Patient/Family Education  Outcome: Resolved/Met     Problem: Falls - Risk of  Goal: *Absence of Falls  Description: Document Ranjana Fall Risk and appropriate interventions in the flowsheet.   2/19/2022 0971 by Jacob Mcconnell  Outcome: Resolved/Met  Note: Fall Risk Interventions:            Medication Interventions: Assess postural VS orthostatic hypotension,Evaluate medications/consider consulting pharmacy,Patient to call before getting OOB,Teach patient to arise slowly         History of Falls Interventions: Door open when patient unattended,Evaluate medications/consider consulting pharmacy      2/19/2022 6102 by Jacob Mcconnell  Outcome: Progressing Towards Goal  Note: Fall Risk Interventions:            Medication Interventions: Assess postural VS orthostatic hypotension,Evaluate medications/consider consulting pharmacy,Patient to call before getting OOB,Teach patient to arise slowly         History of Falls Interventions: Door open when patient unattended,Evaluate medications/consider consulting pharmacy         Problem: Patient Education: [employer] to Patient Education Activity  Goal: Patient/Family Education  Outcome: Resolved/Met

## 2022-02-19 NOTE — PROGRESS NOTES
Bedside and Verbal shift change report given to Rubén Verde RN (oncoming nurse) by self (offgoing nurse). Report included the following information SBAR, Kardex, Intake/Output, MAR and Recent Results.

## 2022-02-19 NOTE — PROGRESS NOTES
Pt is for discharge home today with family after an overnight stay. No needs/supportive care orders recieved for CM at this time. Pt is insured with pharmacy benefits and will follow up with [facility] for continued care.

## 2022-02-19 NOTE — DISCHARGE SUMMARY
[address] [place] [address] 121 Cardiology Discharge Summary     Patient ID:  Evonne Davis  [id number]  49 y.o.  1965    Admit date: 2/18/2022    Discharge date:  02/19/22     Admitting Physician: Braydon Schmitz MD     Discharge Physician: Wan Mart NP/Dr. Concepcion    Admission Diagnoses: PVC (premature ventricular contraction) [I49.3]  Chest pain [R07.9]    Discharge Diagnoses:   Patient Active Problem List    Diagnosis Date Noted    PVC (premature ventricular contraction)     History of  deep vein thrombosis (DVT) of popliteal vein of right lower extremity ([facility] [address]     History of pulmonary embolism [facility])        Cardiology Procedures this admission:  Successful ablation of RVOT PVC. and LVOT PVC mapping but that ablation was aborted   Consults: None    Hospital Course: Patient with prior h/o minimal CAD, PVCs, HTN, and HLD who presented for scheduled PVC ablation. Patient underwent a Successful ablation of RVOT PVC and LVOT PVC mapping but ablation was aborted due to thrombus. She was admitted to [facility] and started on eliquis. The following morning on 2/19/2022, the patient was feeling better. The patient was seen and examined by Dr. Helena Peres and was determined stable and ready for discharge home. The patient was instructed on the importance of medication compliance and outpatient follow up. The patient will follow up with [address] [place] [address] Cardiology Dr. Cole Martinez 2-4 weeks (office will call patient with appt time on Monday). DISPOSITION: The patient is being discharged home in stable condition on a low saturated fat, low cholesterol and low salt diet. The patient is instructed to advance activities as tolerated to the limit of fatigue or shortness of breath. The patient is instructed to call the office or return to the ER for immediate evaluation for any severe shortness of breath, chest pain, prolonged palpitations, near syncope or syncope.     Discharge Exam:   Visit Vitals  /89 (BP 1 Location: Right upper arm, BP Patient Position: At rest)   Pulse 75   Temp 98.2 °F (36.8 °C)   Resp 18   Ht 5' 4\" (1.626 m)   Wt 89.8 kg (197 lb 14.4 oz)   SpO2 95%   BMI 33.97 kg/m²     Patient has been seen by Dr. Cresencio Reveles:  Physical Exam  Vitals reviewed. Cardiovascular:      Rate and Rhythm: Normal rate and regular rhythm. Pulses: Normal pulses. Heart sounds: Normal heart sounds. Pulmonary:      Effort: Pulmonary effort is normal.      Breath sounds: Normal breath sounds. Abdominal:      General: Bowel sounds are normal.      Palpations: Abdomen is soft. Neurological:      Mental Status: She is alert. Bilateral femoral sites with no hematoma but slight bruising noted on right site. Recent Results (from the past 24 hour(s))   METABOLIC PANEL, BASIC    Collection Time: 02/19/22  6:41 AM   Result Value Ref Range    Sodium 144 136 - 145 mmol/L    Potassium 3.9 3.5 - 5.1 mmol/L    Chloride 112 (H) 98 - 107 mmol/L    CO2 26 21 - 32 mmol/L    Anion gap 6 (L) 7 - 16 mmol/L    Glucose 86 65 - 100 mg/dL    BUN 10 6 - 23 MG/DL    Creatinine 0.70 0.6 - 1.0 MG/DL    GFR est AA >60 >60 ml/min/1.73m2    GFR est non-AA >60 >60 ml/min/1.73m2    Calcium 8.7 8.3 - 10.4 MG/DL   MAGNESIUM    Collection Time: 02/19/22  6:41 AM   Result Value Ref Range    Magnesium 2.2 1.8 - 2.4 mg/dL   CBC WITH AUTOMATED DIFF    Collection Time: 02/19/22  6:41 AM   Result Value Ref Range    WBC 8.1 4.3 - 11.1 K/uL    RBC 4.61 4.05 - 5.2 M/uL    HGB 13.6 11.7 - 15.4 g/dL    HCT 41.7 35.8 - 46.3 %    MCV 90.5 79.6 - 97.8 FL    MCH 29.5 26.1 - 32.9 PG    MCHC 32.6 31.4 - 35.0 g/dL    RDW 12.8 11.9 - 14.6 %    PLATELET 168 301 - 866 K/uL    MPV 9.5 9.4 - 12.3 FL    ABSOLUTE NRBC 0.00 0.0 - 0.2 K/uL    DF AUTOMATED      NEUTROPHILS 59 43 - 78 %    LYMPHOCYTES 28 13 - 44 %    MONOCYTES 10 4.0 - 12.0 %    EOSINOPHILS 1 0.5 - 7.8 %    BASOPHILS 1 0.0 - 2.0 %    IMMATURE GRANULOCYTES 0 0.0 - 5.0 %    ABS.  NEUTROPHILS 4.8 1.7 - 8.2 K/UL    ABS. LYMPHOCYTES 2.3 0.5 - 4.6 K/UL    ABS. MONOCYTES 0.8 0.1 - 1.3 K/UL    ABS. EOSINOPHILS 0.1 0.0 - 0.8 K/UL    ABS. BASOPHILS 0.1 0.0 - 0.2 K/UL    ABS. IMM. GRANS. 0.0 0.0 - 0.5 K/UL   EKG, 12 LEAD, INITIAL    Collection Time: 02/19/22  8:39 AM   Result Value Ref Range    Ventricular Rate 78 BPM    Atrial Rate 78 BPM    P-R Interval 168 ms    QRS Duration 84 ms    Q-T Interval 460 ms    QTC Calculation (Bezet) 524 ms    Calculated P Axis 70 degrees    Calculated R Axis 16 degrees    Calculated T Axis 76 degrees    Diagnosis       Sinus rhythm with frequent Premature ventricular complexes in a pattern of   bigeminy  Cannot rule out Anterior infarct , age undetermined  Prolonged QT  Abnormal ECG  When compared with ECG of 15-NOHEMI-2020 07:47,  No significant change was found  Confirmed by ST CATARINA DE LA CRUZ MD (), BRINA MCCOLLUM ([id number]) on 2/19/2022 10:07:01 AM           Patient Instructions:     Discharge Medication List as of 2/19/2022  8:35 AM      START taking these medications    Details   apixaban (ELIQUIS) 5 mg tablet Take 1 Tablet by mouth every twelve (12) hours. , Print, Disp-60 Tablet, R-11         CONTINUE these medications which have NOT CHANGED    Details   verapamil ER (VERELAN) 180 mg CR capsule Take 1 Capsule by mouth daily. , Normal, Disp-90 Capsule, R-3      rosuvastatin (CRESTOR) 10 mg tablet Take 1 Tablet by mouth nightly., Normal, Disp-90 Tablet, R-3      montelukast (SINGULAIR) 10 mg tablet TAKE ONE TABLET BY MOUTH ONCE DAILY FOR ALLERGIES AND ASTHMA, Normal, Disp-90 Tablet, R-3Please consider 90 day supplies to promote better adherence      escitalopram oxalate (LEXAPRO) 10 mg tablet take 1 tablet by mouth once daily, Normal, Disp-90 Tablet, R-5      buPROPion XL (WELLBUTRIN XL) 150 mg tablet Take 1 Tablet by mouth every morning., Normal, Disp-90 Tablet, R-1      albuterol (PROVENTIL HFA, VENTOLIN HFA, PROAIR HFA) 90 mcg/actuation inhaler Take 1 Puff by inhalation every six (6) hours as needed for Wheezing., Normal, Disp-1 Inhaler, R-2      umeclidinium-vilanteroL (Anoro Ellipta) 62.5-25 mcg/actuation inhaler Take 1 Puff by inhalation daily. , Normal, Disp-1 Inhaler, R-5      baclofen (LIORESAL) 10 mg tablet 1-2 po TID prn muscle spasm - causes sleepiness, Normal, Disp-100 Tab, R-5      losartan (COZAAR) 25 mg tablet Take 1 Tab by mouth daily. , Normal, Disp-90 Tab, R-3      fluticasone propionate (Flonase) 50 mcg/actuation nasal spray 2 Sprays by Both Nostrils route daily. , Normal, Disp-3 Bottle, R-11      ondansetron hcl (ZOFRAN) 8 mg tablet Take 8 mg by mouth every eight (8) hours as needed for Nausea., Normal, Disp-35 Tab, R-5               Signed:  Lacie Lockwood NP  2/19/2022  8:05 AM

## 2022-03-18 PROBLEM — R07.9 CHEST PAIN: Status: ACTIVE | Noted: 2022-02-18

## 2022-03-18 PROBLEM — I26.99 ACUTE PULMONARY EMBOLISM (HCC): Status: ACTIVE | Noted: 2017-10-28

## 2022-03-18 PROBLEM — I82.431 ACUTE DEEP VEIN THROMBOSIS (DVT) OF POPLITEAL VEIN OF RIGHT LOWER EXTREMITY (HCC): Status: ACTIVE | Noted: 2017-11-14

## 2022-03-18 PROBLEM — N80.9 ENDOMETRIOSIS: Status: ACTIVE | Noted: 2017-10-28

## 2022-03-19 PROBLEM — R06.00 DYSPNEA: Status: ACTIVE | Noted: 2020-06-14

## 2022-03-19 PROBLEM — E66.9 OBESITY (BMI 30-39.9): Status: ACTIVE | Noted: 2020-09-22

## 2022-03-19 PROBLEM — R09.02 HYPOXEMIA: Status: ACTIVE | Noted: 2020-09-22

## 2022-03-20 PROBLEM — I49.3 PVC (PREMATURE VENTRICULAR CONTRACTION): Status: ACTIVE | Noted: 2020-09-22

## 2022-03-20 PROBLEM — E78.5 DYSLIPIDEMIA: Status: ACTIVE | Noted: 2017-05-03

## 2022-03-20 PROBLEM — G47.33 OSA (OBSTRUCTIVE SLEEP APNEA): Status: ACTIVE | Noted: 2017-11-14

## 2022-04-19 ENCOUNTER — HOSPITAL ENCOUNTER (OUTPATIENT)
Dept: LAB | Age: 57
Discharge: HOME | End: 2022-04-19
Payer: COMMERCIAL

## 2022-04-19 DIAGNOSIS — I49.3 PVC (PREMATURE VENTRICULAR CONTRACTION): ICD-10-CM

## 2022-04-19 LAB
ANION GAP SERPL CALC-SCNC: 8 MMOL/L (ref 7–16)
BASOPHILS # BLD: 0.1 K/UL (ref 0–0.2)
BASOPHILS NFR BLD: 1 % (ref 0–2)
BUN SERPL-MCNC: 15 MG/DL (ref 6–23)
CALCIUM SERPL-MCNC: 9.3 MG/DL (ref 8.3–10.4)
CHLORIDE SERPL-SCNC: 109 MMOL/L (ref 98–107)
CO2 SERPL-SCNC: 24 MMOL/L (ref 21–32)
CREAT SERPL-MCNC: 0.8 MG/DL (ref 0.6–1)
DIFFERENTIAL METHOD BLD: NORMAL
EOSINOPHIL # BLD: 0.1 K/UL (ref 0–0.8)
EOSINOPHIL NFR BLD: 1 % (ref 0.5–7.8)
ERYTHROCYTE [DISTWIDTH] IN BLOOD BY AUTOMATED COUNT: 13.2 % (ref 11.9–14.6)
GLUCOSE SERPL-MCNC: 76 MG/DL (ref 65–100)
HCT VFR BLD AUTO: 43.8 % (ref 35.8–46.3)
HGB BLD-MCNC: 14.1 G/DL (ref 11.7–15.4)
IMM GRANULOCYTES # BLD AUTO: 0 K/UL (ref 0–0.5)
IMM GRANULOCYTES NFR BLD AUTO: 0 % (ref 0–5)
LYMPHOCYTES # BLD: 2.1 K/UL (ref 0.5–4.6)
LYMPHOCYTES NFR BLD: 28 % (ref 13–44)
MAGNESIUM SERPL-MCNC: 2.4 MG/DL (ref 1.8–2.4)
MCH RBC QN AUTO: 29.2 PG (ref 26.1–32.9)
MCHC RBC AUTO-ENTMCNC: 32.2 G/DL (ref 31.4–35)
MCV RBC AUTO: 90.7 FL (ref 79.6–97.8)
MONOCYTES # BLD: 0.6 K/UL (ref 0.1–1.3)
MONOCYTES NFR BLD: 8 % (ref 4–12)
NEUTS SEG # BLD: 4.5 K/UL (ref 1.7–8.2)
NEUTS SEG NFR BLD: 61 % (ref 43–78)
NRBC # BLD: 0 K/UL (ref 0–0.2)
PLATELET # BLD AUTO: 238 K/UL (ref 150–450)
PMV BLD AUTO: 10.9 FL (ref 9.4–12.3)
POTASSIUM SERPL-SCNC: 4 MMOL/L (ref 3.5–5.1)
RBC # BLD AUTO: 4.83 M/UL (ref 4.05–5.2)
SODIUM SERPL-SCNC: 141 MMOL/L (ref 136–145)
WBC # BLD AUTO: 7.4 K/UL (ref 4.3–11.1)

## 2022-04-19 PROCEDURE — 80048 BASIC METABOLIC PNL TOTAL CA: CPT

## 2022-04-19 PROCEDURE — 36415 COLL VENOUS BLD VENIPUNCTURE: CPT

## 2022-04-19 PROCEDURE — 83735 ASSAY OF MAGNESIUM: CPT

## 2022-04-19 PROCEDURE — 85025 COMPLETE CBC W/AUTO DIFF WBC: CPT

## 2022-04-21 ENCOUNTER — ANESTHESIA EVENT (OUTPATIENT)
Dept: CARDIAC CATH/INVASIVE PROCEDURES | Age: 57
End: 2022-04-21
Payer: COMMERCIAL

## 2022-04-21 RX ORDER — LIDOCAINE HYDROCHLORIDE 10 MG/ML
0.1 INJECTION INFILTRATION; PERINEURAL AS NEEDED
Status: CANCELLED | OUTPATIENT
Start: 2022-04-21

## 2022-04-21 RX ORDER — SODIUM CHLORIDE, SODIUM LACTATE, POTASSIUM CHLORIDE, CALCIUM CHLORIDE 600; 310; 30; 20 MG/100ML; MG/100ML; MG/100ML; MG/100ML
75 INJECTION, SOLUTION INTRAVENOUS CONTINUOUS
Status: CANCELLED | OUTPATIENT
Start: 2022-04-21

## 2022-04-21 RX ORDER — FLUMAZENIL 0.1 MG/ML
0.2 INJECTION INTRAVENOUS AS NEEDED
Status: CANCELLED | OUTPATIENT
Start: 2022-04-21

## 2022-04-21 RX ORDER — MIDAZOLAM HYDROCHLORIDE 1 MG/ML
2 INJECTION, SOLUTION INTRAMUSCULAR; INTRAVENOUS
Status: CANCELLED | OUTPATIENT
Start: 2022-04-21 | End: 2022-04-22

## 2022-04-21 RX ORDER — OXYCODONE HYDROCHLORIDE 5 MG/1
10 TABLET ORAL
Status: CANCELLED | OUTPATIENT
Start: 2022-04-21 | End: 2022-04-22

## 2022-04-21 RX ORDER — NALOXONE HYDROCHLORIDE 0.4 MG/ML
0.1 INJECTION, SOLUTION INTRAMUSCULAR; INTRAVENOUS; SUBCUTANEOUS
Status: CANCELLED | OUTPATIENT
Start: 2022-04-21

## 2022-04-21 RX ORDER — SODIUM CHLORIDE, SODIUM LACTATE, POTASSIUM CHLORIDE, CALCIUM CHLORIDE 600; 310; 30; 20 MG/100ML; MG/100ML; MG/100ML; MG/100ML
75 INJECTION, SOLUTION INTRAVENOUS CONTINUOUS
Status: CANCELLED | OUTPATIENT
Start: 2022-04-21 | End: 2022-04-22

## 2022-04-21 RX ORDER — HYDROMORPHONE HYDROCHLORIDE 2 MG/ML
0.5 INJECTION, SOLUTION INTRAMUSCULAR; INTRAVENOUS; SUBCUTANEOUS
Status: CANCELLED | OUTPATIENT
Start: 2022-04-21

## 2022-04-21 RX ORDER — DIPHENHYDRAMINE HYDROCHLORIDE 50 MG/ML
12.5 INJECTION, SOLUTION INTRAMUSCULAR; INTRAVENOUS
Status: CANCELLED | OUTPATIENT
Start: 2022-04-21

## 2022-04-21 RX ORDER — OXYCODONE HYDROCHLORIDE 5 MG/1
5 TABLET ORAL
Status: CANCELLED | OUTPATIENT
Start: 2022-04-21 | End: 2022-04-22

## 2022-04-21 NOTE — PROGRESS NOTES
Patient pre-assessment complete for PVC Ablation with Dr Aramis Montero  scheduled for 22 at 9:30am, arrival time 7:30am. Patient verified using . Patient instructed to bring a list of all home medications on the day of procedure. NPO status reinforced. Patient instructed to HOLD verapamil & losartan tonight Pt has been off of eliquis for a few weeks. Instructed they can take all other medications excluding vitamins & supplements. Patient verbalizes understanding of all instructions & denies any questions at this time.

## 2022-04-22 ENCOUNTER — ANESTHESIA (OUTPATIENT)
Dept: CARDIAC CATH/INVASIVE PROCEDURES | Age: 57
End: 2022-04-22
Payer: COMMERCIAL

## 2022-04-22 ENCOUNTER — HOSPITAL ENCOUNTER (OUTPATIENT)
Age: 57
Discharge: HOME | End: 2022-04-23
Attending: INTERNAL MEDICINE | Admitting: INTERNAL MEDICINE
Payer: COMMERCIAL

## 2022-04-22 DIAGNOSIS — I49.3 PVC (PREMATURE VENTRICULAR CONTRACTION): ICD-10-CM

## 2022-04-22 LAB
ACT BLD: 309 SECS (ref 70–128)
ACT BLD: 356 SECS (ref 70–128)
ATRIAL RATE: 76 BPM
ATRIAL RATE: 86 BPM
CALCULATED P AXIS, ECG09: 58 DEGREES
CALCULATED P AXIS, ECG09: 64 DEGREES
CALCULATED R AXIS, ECG10: -21 DEGREES
CALCULATED R AXIS, ECG10: -8 DEGREES
CALCULATED T AXIS, ECG11: 77 DEGREES
CALCULATED T AXIS, ECG11: 77 DEGREES
DIAGNOSIS, 93000: NORMAL
DIAGNOSIS, 93000: NORMAL
HCG SERPL QL: NEGATIVE
P-R INTERVAL, ECG05: 176 MS
P-R INTERVAL, ECG05: 190 MS
Q-T INTERVAL, ECG07: 408 MS
Q-T INTERVAL, ECG07: 450 MS
QRS DURATION, ECG06: 82 MS
QRS DURATION, ECG06: 82 MS
QTC CALCULATION (BEZET), ECG08: 488 MS
QTC CALCULATION (BEZET), ECG08: 506 MS
VENTRICULAR RATE, ECG03: 76 BPM
VENTRICULAR RATE, ECG03: 86 BPM

## 2022-04-22 PROCEDURE — 93662 INTRACARDIAC ECG (ICE): CPT | Performed by: INTERNAL MEDICINE

## 2022-04-22 PROCEDURE — C1732 CATH, EP, DIAG/ABL, 3D/VECT: HCPCS | Performed by: INTERNAL MEDICINE

## 2022-04-22 PROCEDURE — G0378 HOSPITAL OBSERVATION PER HR: HCPCS

## 2022-04-22 PROCEDURE — 84703 CHORIONIC GONADOTROPIN ASSAY: CPT

## 2022-04-22 PROCEDURE — C1733 CATH, EP, OTHR THAN COOL-TIP: HCPCS | Performed by: INTERNAL MEDICINE

## 2022-04-22 PROCEDURE — C1894 INTRO/SHEATH, NON-LASER: HCPCS | Performed by: INTERNAL MEDICINE

## 2022-04-22 PROCEDURE — 93005 ELECTROCARDIOGRAM TRACING: CPT | Performed by: INTERNAL MEDICINE

## 2022-04-22 PROCEDURE — C1759 CATH, INTRA ECHOCARDIOGRAPHY: HCPCS | Performed by: INTERNAL MEDICINE

## 2022-04-22 PROCEDURE — 93654 COMPRE EP EVAL TX VT: CPT | Performed by: INTERNAL MEDICINE

## 2022-04-22 PROCEDURE — 74011000250 HC RX REV CODE- 250: Performed by: NURSE ANESTHETIST, CERTIFIED REGISTERED

## 2022-04-22 PROCEDURE — 77030027107 HC PTCH EXT REF CARTO3 J&J -F: Performed by: INTERNAL MEDICINE

## 2022-04-22 PROCEDURE — C1760 CLOSURE DEV, VASC: HCPCS | Performed by: INTERNAL MEDICINE

## 2022-04-22 PROCEDURE — 74011250637 HC RX REV CODE- 250/637: Performed by: INTERNAL MEDICINE

## 2022-04-22 PROCEDURE — 85347 COAGULATION TIME ACTIVATED: CPT

## 2022-04-22 PROCEDURE — 77030013794 HC KT TRNSDUC BLD EDWD -B: Performed by: NURSE ANESTHETIST, CERTIFIED REGISTERED

## 2022-04-22 PROCEDURE — 77030035291 HC TBNG PMP SMARTABLATE J&J -B: Performed by: INTERNAL MEDICINE

## 2022-04-22 PROCEDURE — 93621 COMP EP EVL L PAC&REC C SINS: CPT | Performed by: INTERNAL MEDICINE

## 2022-04-22 PROCEDURE — 76060000037 HC ANESTHESIA 3 TO 3.5 HR: Performed by: INTERNAL MEDICINE

## 2022-04-22 PROCEDURE — 77030013687 HC GD NDL BARD -B: Performed by: INTERNAL MEDICINE

## 2022-04-22 PROCEDURE — 74011250636 HC RX REV CODE- 250/636: Performed by: NURSE ANESTHETIST, CERTIFIED REGISTERED

## 2022-04-22 PROCEDURE — 74011000250 HC RX REV CODE- 250: Performed by: INTERNAL MEDICINE

## 2022-04-22 PROCEDURE — C1730 CATH, EP, 19 OR FEW ELECT: HCPCS | Performed by: INTERNAL MEDICINE

## 2022-04-22 RX ORDER — BUPROPION HYDROCHLORIDE 150 MG/1
150 TABLET, EXTENDED RELEASE ORAL
Status: DISCONTINUED | OUTPATIENT
Start: 2022-04-22 | End: 2022-04-23 | Stop reason: HOSPADM

## 2022-04-22 RX ORDER — BUPROPION HYDROCHLORIDE 150 MG/1
150 TABLET, EXTENDED RELEASE ORAL 2 TIMES DAILY
Status: DISCONTINUED | OUTPATIENT
Start: 2022-04-22 | End: 2022-04-22

## 2022-04-22 RX ORDER — PROPOFOL 10 MG/ML
INJECTION, EMULSION INTRAVENOUS AS NEEDED
Status: DISCONTINUED | OUTPATIENT
Start: 2022-04-22 | End: 2022-04-22

## 2022-04-22 RX ORDER — SODIUM CHLORIDE 0.9 % (FLUSH) 0.9 %
5-40 SYRINGE (ML) INJECTION AS NEEDED
Status: DISCONTINUED | OUTPATIENT
Start: 2022-04-22 | End: 2022-04-23 | Stop reason: HOSPADM

## 2022-04-22 RX ORDER — ACETAMINOPHEN 325 MG/1
650 TABLET ORAL
Status: DISCONTINUED | OUTPATIENT
Start: 2022-04-22 | End: 2022-04-23 | Stop reason: HOSPADM

## 2022-04-22 RX ORDER — ROSUVASTATIN CALCIUM 10 MG/1
10 TABLET, COATED ORAL
Status: DISCONTINUED | OUTPATIENT
Start: 2022-04-22 | End: 2022-04-23 | Stop reason: HOSPADM

## 2022-04-22 RX ORDER — PROPOFOL 10 MG/ML
INJECTION, EMULSION INTRAVENOUS
Status: DISCONTINUED | OUTPATIENT
Start: 2022-04-22 | End: 2022-04-22 | Stop reason: HOSPADM

## 2022-04-22 RX ORDER — VERAPAMIL HYDROCHLORIDE 180 MG/1
180 TABLET, EXTENDED RELEASE ORAL
Status: DISCONTINUED | OUTPATIENT
Start: 2022-04-22 | End: 2022-04-23 | Stop reason: HOSPADM

## 2022-04-22 RX ORDER — ESCITALOPRAM OXALATE 10 MG/1
10 TABLET ORAL
Status: DISCONTINUED | OUTPATIENT
Start: 2022-04-22 | End: 2022-04-23 | Stop reason: HOSPADM

## 2022-04-22 RX ORDER — HYDROCODONE BITARTRATE AND ACETAMINOPHEN 5; 325 MG/1; MG/1
1 TABLET ORAL
Status: DISCONTINUED | OUTPATIENT
Start: 2022-04-22 | End: 2022-04-23 | Stop reason: HOSPADM

## 2022-04-22 RX ORDER — PROPOFOL 10 MG/ML
INJECTION, EMULSION INTRAVENOUS AS NEEDED
Status: DISCONTINUED | OUTPATIENT
Start: 2022-04-22 | End: 2022-04-22 | Stop reason: HOSPADM

## 2022-04-22 RX ORDER — SODIUM CHLORIDE, SODIUM LACTATE, POTASSIUM CHLORIDE, CALCIUM CHLORIDE 600; 310; 30; 20 MG/100ML; MG/100ML; MG/100ML; MG/100ML
INJECTION, SOLUTION INTRAVENOUS
Status: DISCONTINUED | OUTPATIENT
Start: 2022-04-22 | End: 2022-04-22 | Stop reason: HOSPADM

## 2022-04-22 RX ORDER — HEPARIN SODIUM 1000 [USP'U]/ML
INJECTION, SOLUTION INTRAVENOUS; SUBCUTANEOUS AS NEEDED
Status: DISCONTINUED | OUTPATIENT
Start: 2022-04-22 | End: 2022-04-22 | Stop reason: HOSPADM

## 2022-04-22 RX ORDER — HEPARIN SODIUM 5000 [USP'U]/100ML
INJECTION, SOLUTION INTRAVENOUS
Status: DISCONTINUED | OUTPATIENT
Start: 2022-04-22 | End: 2022-04-22 | Stop reason: HOSPADM

## 2022-04-22 RX ORDER — GLYCOPYRROLATE 0.2 MG/ML
INJECTION INTRAMUSCULAR; INTRAVENOUS AS NEEDED
Status: DISCONTINUED | OUTPATIENT
Start: 2022-04-22 | End: 2022-04-22 | Stop reason: HOSPADM

## 2022-04-22 RX ORDER — LOSARTAN POTASSIUM 25 MG/1
25 TABLET ORAL
Status: DISCONTINUED | OUTPATIENT
Start: 2022-04-22 | End: 2022-04-23 | Stop reason: HOSPADM

## 2022-04-22 RX ORDER — SODIUM CHLORIDE 0.9 % (FLUSH) 0.9 %
5-40 SYRINGE (ML) INJECTION EVERY 8 HOURS
Status: DISCONTINUED | OUTPATIENT
Start: 2022-04-22 | End: 2022-04-23 | Stop reason: HOSPADM

## 2022-04-22 RX ADMIN — SODIUM CHLORIDE, PRESERVATIVE FREE 10 ML: 5 INJECTION INTRAVENOUS at 21:30

## 2022-04-22 RX ADMIN — PROPOFOL 160 MCG/KG/MIN: 10 INJECTION, EMULSION INTRAVENOUS at 09:39

## 2022-04-22 RX ADMIN — SODIUM CHLORIDE, PRESERVATIVE FREE 10 ML: 5 INJECTION INTRAVENOUS at 15:07

## 2022-04-22 RX ADMIN — HEPARIN SODIUM 40 UNITS/KG/HR: 5000 INJECTION, SOLUTION INTRAVENOUS at 09:58

## 2022-04-22 RX ADMIN — ROSUVASTATIN 10 MG: 10 TABLET, FILM COATED ORAL at 21:30

## 2022-04-22 RX ADMIN — BUPROPION HYDROCHLORIDE 150 MG: 150 TABLET, EXTENDED RELEASE ORAL at 21:30

## 2022-04-22 RX ADMIN — HEPARIN SODIUM 15000 UNITS: 1000 INJECTION INTRAVENOUS; SUBCUTANEOUS at 09:58

## 2022-04-22 RX ADMIN — LOSARTAN POTASSIUM 25 MG: 25 TABLET, FILM COATED ORAL at 21:29

## 2022-04-22 RX ADMIN — VERAPAMIL HYDROCHLORIDE 180 MG: 180 TABLET, FILM COATED, EXTENDED RELEASE ORAL at 21:29

## 2022-04-22 RX ADMIN — SODIUM CHLORIDE, PRESERVATIVE FREE 10 ML: 5 INJECTION INTRAVENOUS at 15:08

## 2022-04-22 RX ADMIN — ACETAMINOPHEN 650 MG: 325 TABLET ORAL at 21:27

## 2022-04-22 RX ADMIN — SODIUM CHLORIDE, SODIUM LACTATE, POTASSIUM CHLORIDE, AND CALCIUM CHLORIDE: 600; 310; 30; 20 INJECTION, SOLUTION INTRAVENOUS at 09:30

## 2022-04-22 RX ADMIN — PROPOFOL 50 MG: 10 INJECTION, EMULSION INTRAVENOUS at 09:39

## 2022-04-22 RX ADMIN — ESCITALOPRAM OXALATE 10 MG: 10 TABLET ORAL at 21:29

## 2022-04-22 RX ADMIN — GLYCOPYRROLATE 0.1 MG: 0.2 INJECTION INTRAMUSCULAR; INTRAVENOUS at 09:49

## 2022-04-22 NOTE — ROUTINE PROCESS
Bedside and Verbal shift change report given to myself (oncoming nurse) by Henry Sheridan RN (offgoing nurse). Report included the following information SBAR, Kardex, MAR and Recent Results.

## 2022-04-22 NOTE — PROGRESS NOTES
Patient received to [address] [place] [address] room # 9  Ambulatory from [facility]. Patient scheduled for PVC ablation today with Dr Hope Mishra. Procedure reviewed & questions answered, voiced good understanding consent obtained & placed on chart. All medications and medical history reviewed. Will prep patient per orders. Patient & family updated on plan of care. The patient has a fraility score of 3-MANAGING WELL, based on patient A&Ox3, patient able to ambulate to room without difficulty.

## 2022-04-22 NOTE — PROGRESS NOTES
Report received from [employer]. Procedural finding communicated. Intra procedural medication administration reviewed. Progression of care discussed. Patient received into CPRU room 8, Post PVC ablation    Access site without bleeding or swelling. bilat groin    Patient instructed to limit movement of bilat lower extremity. Routine post procedural vital signs & site assessment initiated.

## 2022-04-22 NOTE — ANESTHESIA POSTPROCEDURE EVALUATION
Procedure(s):  ABLATION PVC.    total IV anesthesia    Anesthesia Post Evaluation      Multimodal analgesia: multimodal analgesia used between 6 hours prior to anesthesia start to PACU discharge  Patient location during evaluation: PACU  Patient participation: complete - patient participated  Level of consciousness: awake  Pain management: adequate  Airway patency: patent  Anesthetic complications: no  Cardiovascular status: acceptable and hemodynamically stable  Respiratory status: acceptable  Hydration status: acceptable  Comments: Acceptable for discharge from PACU. Post anesthesia nausea and vomiting:  none  Final Post Anesthesia Temperature Assessment:  Normothermia (36.0-37.5 degrees C)      INITIAL Post-op Vital signs:   Vitals Value Taken Time   /107 04/22/22 1328   Temp 37.1 °C (98.7 °F) 04/22/22 1240   Pulse 87 04/22/22 1332   Resp 12 04/22/22 1240   SpO2 100 % 04/22/22 1332   Vitals shown include unvalidated device data.

## 2022-04-22 NOTE — PROCEDURES
Pre-Electrophysiology Diagnosis  1. Premature ventricular contractions     Procedure Performed  1. Comprehensive EP study. 2. Left atrial pacing recording from the coronary sinus. 3. 3-D Electroanatomical mapping. 4. Ablation of ventricular tachycardia focus. 5. Intracardiac echocardiography    Anesthesia: MAC     Estimated Blood Loss: Less than 10 mL     Procedure in Detail:  The patient was brought to the electrophysiology lab in the fasting state. A Ref-Star CARTO patch was placed, the patient was then prepped and draped in sterile fashion. Venous access was then obtained x 3 using a modified Seldinger technique under ultrasound guidance with placement of 2 8Fr short sidearm sheaths into the right femoral vein and a 10 Fr short side arm sheath into the left femoral vein. Arterial access was obtained with an 8Fr sheath introduced also by modified Seldinger technique under ultrasound guidance. Heparin was given with a weight based bolus and drip for a goal ACT of 300-350 seconds. A CARTO Accunav/ReachTaxtar intracardiac echo catheter was inserted via the 10Fr short LFV sheath, and advanced to the mid RA and mapping of the body of the right ventricle, RVOT, left ventricle, and LVOT was performed including creation of LV cusp anatomy and left and right coronary ostium definition as well as papillary muscle morphology. A Pentaray catheter was then inserted and used to create an electroanatomical map of the RA, coronary sinus, and His location. A BiosFour Interactive Evans CS catheter was then placed in the mid body of the CS and used to record LA and LV electrograms and to pace the CS during the EP study. The pentaray was used to create an activation map of the PVC from the RV. There were no early sites within the RVOT and mapping suggested a left sided PVC. The Myrtis Mari was then placed in the arterial 8 Fr sheath. The pentaray was then used to create an activation and anatomical map of the PVC.   The Myrtis Mari was used to create an extensive map of the LVOT including mapping of the proximal aortic root and around the aortic valve cusps. There was a region along the left aortic cusp with a very early signal as well as mid diastolic potentials during NSR. However, this was not focused to a single activation spot. Extensive mapping and pace mapping was performed around this location and the LVOT region. An 8 Fr, 3.5mm tip saline irrigated bidirectional D/F curve Smart Touch BioSense Evans ablation catheter was then inserted and used for confirmation of activation and pace mapping. Ablation was performed at this location with support from ICE imaging. Of note, the patient was not having frequent PVCs. Ablation at this location suppressed the PVCs, but did not terminate the PVCs. Ablation was also performed within the RVOT at this region. In addition, the ablation catheter was inserted into the distal CS for mapping but did not reveal early signal or a good pace map. Ablation was again performed along the early LVOT and aortic root signals. Post procedure ICE was negative for pericardial effusion. Baseline Intervals    QRS duration: 92 msec  VA interval: 194 msec  RR interval: 665 msec  AH interval: 61 msec  HV interval: 41 msec    At the completion of the final comprehensive EP study, all catheters were removed, and sheaths pulled. The patient tolerated the procedure well with no acute complications recognized. The arterial sheath was closed with Perclose and the venous sheaths were closed with Vascade. Summary:   1. Ablation of LVOT PVCs  2. Comprehensive EP study . 3. Pt tolerated the procedure well. Shreya Darling MD, MS  Clinical Cardiac Electrophysiology  4/22/2022  12:20 PM

## 2022-04-22 NOTE — Clinical Note
Bilateral groin clipped prepped with ChloraPrep and draped. Wet prep solution applied at: 935. Wet prep solution dried at: 940. Wet prep elapsed drying time: 5 mins.

## 2022-04-22 NOTE — ANESTHESIA PREPROCEDURE EVALUATION
Relevant Problems   RESPIRATORY SYSTEM   (+) Dyspnea   (+) LACHELLE (obstructive sleep apnea)      CARDIOVASCULAR   (+) CAD (coronary artery disease)   (+) HTN (hypertension)   (+) Hypertension   (+) PVC (premature ventricular contraction)       Anesthetic History   No history of anesthetic complications            Review of Systems / Medical History  Patient summary reviewed and pertinent labs reviewed    Pulmonary        Sleep apnea (Uses mouth guard)  Smoker (Quit ten years ago)         Neuro/Psych         Headaches (H/O vascular headache)     Cardiovascular    Hypertension: well controlled        Dysrhythmias : PVC  Hyperlipidemia    Exercise tolerance: >4 METS  Comments:   Left Ventricle: Left ventricle size is normal. Normal wall thickness. Normal wall motion. Normal left ventricular systolic function with a visually estimated EF of 55 - 60%. Diastolic dysfunction present with normal LV EF.   Mitral Valve: Mild mitral annular calcification. Mild transvalvular regurgitation.   Left Atrium: Left atrium is mildly dilated. LA Vol Index is  23 ml/m2.      GI/Hepatic/Renal     GERD: well controlled      Hiatal hernia     Endo/Other             Other Findings   Comments: R sided PVC ablation 2/22, now for L sided ablation           Physical Exam    Airway  Mallampati: II  TM Distance: 4 - 6 cm  Neck ROM: normal range of motion   Mouth opening: Normal     Cardiovascular    Rhythm: regular  Rate: normal         Dental  No notable dental hx       Pulmonary  Breath sounds clear to auscultation               Abdominal  GI exam deferred       Other Findings            Anesthetic Plan    ASA: 2  Anesthesia type: total IV anesthesia          Induction: Intravenous  Anesthetic plan and risks discussed with: Patient

## 2022-04-22 NOTE — PROGRESS NOTES
CM chart reviewed. Pt presented for scheduled PVC ablation with Dr Hope Mishra. PCP confirmed. Insurance verified. On RA. No d/c needs identified at this time but will remain available. Care Management Interventions  PCP Verified by CM: Yes (Chau Mancuso)  Mode of Transport at Discharge:  Other (see comment) (Family)  Transition of Care Consult (CM Consult): Discharge Planning  Discharge Durable Medical Equipment: No  Physical Therapy Consult: No  Occupational Therapy Consult: No  Speech Therapy Consult: No  Support Systems: Spouse/Significant Other,Child(kiara),Other Family Member(s),Friend/Neighbor,[employer]/[place] [employer]  Confirm Follow Up Transport: Family  The Plan for Transition of Care is Related to the Following Treatment Goals : Return home and back to her baseline  Discharge Location  Patient Expects to be Discharged to[de-identified] Home

## 2022-04-22 NOTE — ROUTINE PROCESS
TRANSFER - IN REPORT:    Verbal report received from [facility]) on [employer]  being received from Cath lab(unit) for routine progression of care      Report consisted of patients Situation, Background, Assessment and   Recommendations(SBAR). Information from the following report(s) SBAR, Kardex, Procedure Summary, MAR, Recent Results and Cardiac Rhythm SR w/ PVC was reviewed with the receiving nurse. Opportunity for questions and clarification was provided. Assessment completed upon patients arrival to unit and care assumed.

## 2022-04-22 NOTE — Clinical Note
Sheath #2: Sheath: removed. Upon evaluation of the common femoral artery stick using fluoroscopy, the access site puncture was within the safe zone.  Closed with a perclose

## 2022-04-22 NOTE — ROUTINE PROCESS
Primary Nurse Ashlyn Lucio RN and Mich Justice RN performed a dual skin assessment on this patient Impairment noted- see wound doc flow sheet    Sacrum dry and intact. Heels are dry and intact. Bilateral groin sites have dressings from procedure and are both dry and intact. Old drainage present around sites, but no active drainage present.

## 2022-04-22 NOTE — Clinical Note
Sheath #all: Sheath: removed. Upon evaluation of the common femoral artery stick using fluoroscopy, the access site puncture was within the safe zone.  Closed with vascade 1-2 and 4

## 2022-04-22 NOTE — PROGRESS NOTES
TRANSFER - OUT REPORT:    Verbal report given to Will RN(name) on [employer]  being transferred to tele(unit) for routine progression of care       Report consisted of patients Situation, Background, Assessment and   Recommendations(SBAR). Information from the following report(s) Procedure Summary was reviewed with the receiving nurse. Lines:   Peripheral IV 04/22/22 Left;Posterior Hand (Active)       Peripheral IV 04/22/22 Posterior;Right Hand (Active)        Opportunity for questions and clarification was provided.       Patient transported with:   [employer]

## 2022-04-23 VITALS
BODY MASS INDEX: 33.72 KG/M2 | HEART RATE: 80 BPM | OXYGEN SATURATION: 96 % | RESPIRATION RATE: 16 BRPM | WEIGHT: 197.5 LBS | SYSTOLIC BLOOD PRESSURE: 109 MMHG | TEMPERATURE: 97.4 F | DIASTOLIC BLOOD PRESSURE: 65 MMHG | HEIGHT: 64 IN

## 2022-04-23 LAB
ANION GAP SERPL CALC-SCNC: 7 MMOL/L (ref 7–16)
ATRIAL RATE: 75 BPM
BUN SERPL-MCNC: 11 MG/DL (ref 6–23)
CALCIUM SERPL-MCNC: 9 MG/DL (ref 8.3–10.4)
CALCULATED P AXIS, ECG09: 66 DEGREES
CALCULATED R AXIS, ECG10: 24 DEGREES
CALCULATED T AXIS, ECG11: 58 DEGREES
CHLORIDE SERPL-SCNC: 110 MMOL/L (ref 98–107)
CO2 SERPL-SCNC: 25 MMOL/L (ref 21–32)
CREAT SERPL-MCNC: 0.7 MG/DL (ref 0.6–1)
DIAGNOSIS, 93000: NORMAL
GLUCOSE SERPL-MCNC: 90 MG/DL (ref 65–100)
MAGNESIUM SERPL-MCNC: 2.3 MG/DL (ref 1.8–2.4)
P-R INTERVAL, ECG05: 172 MS
POTASSIUM SERPL-SCNC: 3.4 MMOL/L (ref 3.5–5.1)
Q-T INTERVAL, ECG07: 480 MS
QRS DURATION, ECG06: 86 MS
QTC CALCULATION (BEZET), ECG08: 536 MS
SODIUM SERPL-SCNC: 142 MMOL/L (ref 136–145)
VENTRICULAR RATE, ECG03: 75 BPM

## 2022-04-23 PROCEDURE — 80048 BASIC METABOLIC PNL TOTAL CA: CPT

## 2022-04-23 PROCEDURE — 83735 ASSAY OF MAGNESIUM: CPT

## 2022-04-23 PROCEDURE — 36415 COLL VENOUS BLD VENIPUNCTURE: CPT

## 2022-04-23 PROCEDURE — G0378 HOSPITAL OBSERVATION PER HR: HCPCS

## 2022-04-23 PROCEDURE — 74011250637 HC RX REV CODE- 250/637: Performed by: INTERNAL MEDICINE

## 2022-04-23 PROCEDURE — 74011000250 HC RX REV CODE- 250: Performed by: INTERNAL MEDICINE

## 2022-04-23 PROCEDURE — 74011250637 HC RX REV CODE- 250/637: Performed by: PHYSICIAN ASSISTANT

## 2022-04-23 PROCEDURE — 93005 ELECTROCARDIOGRAM TRACING: CPT | Performed by: INTERNAL MEDICINE

## 2022-04-23 PROCEDURE — 99212 OFFICE O/P EST SF 10 MIN: CPT | Performed by: INTERNAL MEDICINE

## 2022-04-23 RX ORDER — POTASSIUM CHLORIDE 20 MEQ/1
40 TABLET, EXTENDED RELEASE ORAL
Status: COMPLETED | OUTPATIENT
Start: 2022-04-23 | End: 2022-04-23

## 2022-04-23 RX ADMIN — POTASSIUM CHLORIDE 40 MEQ: 20 TABLET, EXTENDED RELEASE ORAL at 08:22

## 2022-04-23 RX ADMIN — SODIUM CHLORIDE, PRESERVATIVE FREE 5 ML: 5 INJECTION INTRAVENOUS at 06:33

## 2022-04-23 RX ADMIN — APIXABAN 5 MG: 5 TABLET, FILM COATED ORAL at 08:22

## 2022-04-23 NOTE — PROGRESS NOTES
[facility] CARDIOLOGY PROGRESS NOTE           4/23/2022 8:27 AM    Admit Date: 4/22/2022    Admit Diagnosis: PVC (premature ventricular contraction) [I49.3]      Subjective:   No complaints this AM, no chest pain or shortness of breath    Interval History: (History of pertinent interval events obtained from nursing staff)  S/p cardiac ablation yesterday without immediate complications    ROS:  GEN:  No fever or chills  Cardiovascular:  As noted above  Pulmonary:  As noted above  Neuro:  No new focal motor or sensory loss      Objective:     Vitals:    04/22/22 2045 04/23/22 0035 04/23/22 0505 04/23/22 0754   BP: (!) 169/79 130/67 (!) 99/58 109/65   Pulse: 75 87 84 80   Resp: 16 16 16 16   Temp: 98.7 °F (37.1 °C) 98.6 °F (37 °C) 98.7 °F (37.1 °C) 97.4 °F (36.3 °C)   SpO2: 95% 94% 95% 96%   Weight:   197 lb 8 oz (89.6 kg)    Height:           Physical Exam:  General-Well Developed, Well Nourished, No Acute Distress, Alert & Oriented x 3, appropriate mood.   Neck- supple, no JVD  CV- regular rate and rhythm no MRG  Lung- clear bilaterally  Abd- soft, nontender, nondistended  Ext- no edema bilaterally, B/L femoral access sites without hematoma or bruits  Skin- warm and dry    Current Facility-Administered Medications   Medication Dose Route Frequency    apixaban (ELIQUIS) tablet 5 mg  5 mg Oral Q12H    escitalopram oxalate (LEXAPRO) tablet 10 mg  10 mg Oral QHS    losartan (COZAAR) tablet 25 mg  25 mg Oral QHS    rosuvastatin (CRESTOR) tablet 10 mg  10 mg Oral QHS    tiotropium-olodateroL (STIOLTO RESPIMAT) 2.5-2.5 mcg/actuation inhaler 2 Puff  2 Puff Inhalation DAILY    verapamil ER (CALAN-SR) tablet 180 mg  180 mg Oral QHS    sodium chloride (NS) flush 5-40 mL  5-40 mL IntraVENous Q8H    sodium chloride (NS) flush 5-40 mL  5-40 mL IntraVENous PRN    acetaminophen (TYLENOL) tablet 650 mg  650 mg Oral Q4H PRN    HYDROcodone-acetaminophen (NORCO) 5-325 mg per tablet 1 Tablet  1 Tablet Oral Q4H PRN    buPROPion SR (WELLBUTRIN SR) tablet 150 mg  150 mg Oral QHS     Data Review:   Recent Results (from the past 24 hour(s))   POC ACTIVATED CLOTTING TIME    Collection Time: 04/22/22 10:10 AM   Result Value Ref Range    Activated Clotting Time (POC) 356 (H) 70 - 128 SECS   POC ACTIVATED CLOTTING TIME    Collection Time: 04/22/22 11:03 AM   Result Value Ref Range    Activated Clotting Time (POC) 309 (H) 70 - 128 SECS   EKG, 12 LEAD, INITIAL    Collection Time: 04/22/22  3:03 PM   Result Value Ref Range    Ventricular Rate 86 BPM    Atrial Rate 86 BPM    P-R Interval 190 ms    QRS Duration 82 ms    Q-T Interval 408 ms    QTC Calculation (Bezet) 488 ms    Calculated P Axis 64 degrees    Calculated R Axis -8 degrees    Calculated T Axis 77 degrees    Diagnosis       Sinus rhythm with occasional Premature ventricular complexes  Minimal voltage criteria for LVH, may be normal variant  Nonspecific ST and T wave abnormality  When compared with ECG of 22-APR-2022 08:13,  No significant change was found  Confirmed by Jose Keller MD, hospitals ([id number]) on 4/22/2022 3:35:95 PM     METABOLIC PANEL, BASIC    Collection Time: 04/23/22  4:18 AM   Result Value Ref Range    Sodium 142 136 - 145 mmol/L    Potassium 3.4 (L) 3.5 - 5.1 mmol/L    Chloride 110 (H) 98 - 107 mmol/L    CO2 25 21 - 32 mmol/L    Anion gap 7 7 - 16 mmol/L    Glucose 90 65 - 100 mg/dL    BUN 11 6 - 23 MG/DL    Creatinine 0.70 0.6 - 1.0 MG/DL    GFR est AA >60 >60 ml/min/1.73m2    GFR est non-AA >60 >60 ml/min/1.73m2    Calcium 9.0 8.3 - 10.4 MG/DL   MAGNESIUM    Collection Time: 04/23/22  4:18 AM   Result Value Ref Range    Magnesium 2.3 1.8 - 2.4 mg/dL       EKG:  (EKG has been independently visualized by me with interpretation below)  Assessment:     Principal Problem:    PVC (premature ventricular contraction) (9/22/2020)      Plan:   1.  PVCs:  Pt underwent cardiac ablation for PVCs yesterday without immediate complication, no questions or concerns this AM, ready and stable for discharge with appropriate follow up. Jarod Darling MD  Cardiology/Electrophysiology

## 2022-04-23 NOTE — DISCHARGE SUMMARY
[facility] Cardiology Discharge Summary     Patient ID:  Thompson Johnson  [id number]  49 y.o.  1965    Admit date: 4/22/2022    Discharge date:  4/23/2022    Admitting Physician: Luis A Saravia MD     Discharge Physician: Dr. Deanne Haskins    Admission Diagnoses: PVC (premature ventricular contraction) [I49.3]    Discharge Diagnoses:    Diagnosis    CAD (coronary artery disease)    Hyperlipidemia    Hypertension    History of pulmonary embolus (PE)    Obesity (BMI 30-39. 9)    PVC (premature ventricular contraction)    Dyspnea    History of deep vein thrombosis (DVT) of popliteal vein of right lower extremity     LACHELLE (obstructive sleep apnea)    Endometriosis    Allergic rhinitis    Esophagitis    Malaise and fatigue       Cardiology Procedures this admission:  EPS with LVOT PVC ablation  Consults: None    Hospital Course: Patient was seen at the office of [facility] by Dr. Deanne Haskins for complaints of fatigue, low energy and SOB and was subsequently scheduled for a EPS at [facility] on 4/22/22. Patient was taken to the EP lab and underwent EPS with successful LVOT PVC ablation. Patient tolerated the procedure well and was taken to the telemetry floor for recovery. The following morning patient was up feeling well without any complaints of chest pain or shortness of breath. Still  w few PVCs on tele. Patient's right and left femoral venous cath sites were clean, dry and intact without hematoma or bleeding. Patient's labs were stable. Patient was seen and examined by Dr. Deanne Haskins and determined stable and ready for discharge. Patient was instructed on the importance of medication compliance and follow-up. The patient will follow up with [facility] Dr. Deanne Haskins on 6/1/22 at 4:15PM in the [place] office. 5 minutes spent discussing hospital course and discharge instructions. All questions answered.      DISPOSITION: The patient is being discharged home in stable condition on a low saturated fat, low cholesterol and low salt diet. The patient is instructed to advance activities as tolerated to the limit of fatigue or shortness of breath. The patient is instructed to avoid all heavy lifting, straining, stooping or squatting for 3-5 days. The patient is instructed to watch the cath site for bleeding/oozing; if seen, the patient is instructed to apply firm pressure with a clean cloth and call [facility] at [phone]. The patient is instructed to watch for signs of infection which include: increasing area of redness, fever/hot to touch or purulent drainage at the catheterization site. The patient is instructed not to soak in a bathtub for 7-10 days, but is cleared to shower. The patient is instructed to call the office or return to the ER for immediate evaluation for any palpitations, shortness of breath or chest pain. Discharge Exam:   Visit Vitals  /67 (BP 1 Location: Left upper arm, BP Patient Position: At rest)   Pulse 87   Temp 98.6 °F (37 °C)   Resp 16   Ht 5' 4\" (1.626 m)   Wt 85.7 kg (189 lb)   SpO2 94%   BMI 32.44 kg/m²       Patient has been seen by Dr. Merry Bernardo: see his progress note for exam details.     Recent Results (from the past 24 hour(s))   HCG QL SERUM    Collection Time: 04/22/22  7:47 AM   Result Value Ref Range    HCG, Ql. Negative NEG     EKG, 12 LEAD, INITIAL    Collection Time: 04/22/22  8:13 AM   Result Value Ref Range    Ventricular Rate 76 BPM    Atrial Rate 76 BPM    P-R Interval 176 ms    QRS Duration 82 ms    Q-T Interval 450 ms    QTC Calculation (Bezet) 506 ms    Calculated P Axis 58 degrees    Calculated R Axis -21 degrees    Calculated T Axis 77 degrees    Diagnosis       Sinus rhythm with frequent and consecutive Premature ventricular complexes -   RVOT focus  Minimal voltage criteria for LVH, may be normal variant  When compared with ECG of 19-FEB-2022 08:39,  No significant change was found  Confirmed by Alyson Soto MD, Margaret Payan ([id number]) on 4/22/2022 8:48:54 AM     POC ACTIVATED CLOTTING TIME    Collection Time: 04/22/22 10:10 AM   Result Value Ref Range    Activated Clotting Time (POC) 356 (H) 70 - 128 SECS   POC ACTIVATED CLOTTING TIME    Collection Time: 04/22/22 11:03 AM   Result Value Ref Range    Activated Clotting Time (POC) 309 (H) 70 - 128 SECS   EKG, 12 LEAD, INITIAL    Collection Time: 04/22/22  3:03 PM   Result Value Ref Range    Ventricular Rate 86 BPM    Atrial Rate 86 BPM    P-R Interval 190 ms    QRS Duration 82 ms    Q-T Interval 408 ms    QTC Calculation (Bezet) 488 ms    Calculated P Axis 64 degrees    Calculated R Axis -8 degrees    Calculated T Axis 77 degrees    Diagnosis       Sinus rhythm with occasional Premature ventricular complexes  Minimal voltage criteria for LVH, may be normal variant  Nonspecific ST and T wave abnormality  When compared with ECG of 22-APR-2022 08:13,  No significant change was found  Confirmed by Carlos Dhaliwal MD, Cindy Record ([id number]) on 4/22/2022 4:07:17 PM           Patient Instructions:   Current Discharge Medication List      CONTINUE these medications which have NOT CHANGED    Details   verapamil ER (VERELAN) 180 mg CR capsule Take 1 Capsule by mouth daily. Qty: 90 Capsule, Refills: 3      rosuvastatin (CRESTOR) 10 mg tablet Take 1 Tablet by mouth nightly. Qty: 90 Tablet, Refills: 3      montelukast (SINGULAIR) 10 mg tablet TAKE ONE TABLET BY MOUTH ONCE DAILY FOR ALLERGIES AND ASTHMA  Qty: 90 Tablet, Refills: 3    Comments: Please consider 90 day supplies to promote better adherence  Associated Diagnoses: Non-seasonal allergic rhinitis      escitalopram oxalate (LEXAPRO) 10 mg tablet take 1 tablet by mouth once daily  Qty: 90 Tablet, Refills: 5    Associated Diagnoses: Malaise and fatigue      buPROPion XL (WELLBUTRIN XL) 150 mg tablet Take 1 Tablet by mouth every morning.   Qty: 90 Tablet, Refills: 1    Associated Diagnoses: Mood disorder (HCC)      losartan (COZAAR) 25 mg tablet Take 1 Tab by mouth daily. Qty: 90 Tab, Refills: 3    Associated Diagnoses: Essential hypertension      apixaban (ELIQUIS) 5 mg tablet Take 1 Tablet by mouth every twelve (12) hours. Qty: 60 Tablet, Refills: 11      albuterol (PROVENTIL HFA, VENTOLIN HFA, PROAIR HFA) 90 mcg/actuation inhaler Take 1 Puff by inhalation every six (6) hours as needed for Wheezing. Qty: 1 Inhaler, Refills: 2    Associated Diagnoses: ALCALA (dyspnea on exertion)      umeclidinium-vilanteroL (Anoro Ellipta) 62.5-25 mcg/actuation inhaler Take 1 Puff by inhalation daily. Qty: 1 Inhaler, Refills: 5    Associated Diagnoses: ALCALA (dyspnea on exertion); Dyspnea, unspecified type      baclofen (LIORESAL) 10 mg tablet 1-2 po TID prn muscle spasm - causes sleepiness  Qty: 100 Tab, Refills: 5    Associated Diagnoses: Right shoulder pain, unspecified chronicity      fluticasone propionate (Flonase) 50 mcg/actuation nasal spray 2 Sprays by Both Nostrils route daily. Qty: 3 Bottle, Refills: 11    Associated Diagnoses: Cough      ondansetron hcl (ZOFRAN) 8 mg tablet Take 8 mg by mouth every eight (8) hours as needed for Nausea.   Qty: 35 Tab, Refills: 5    Associated Diagnoses: Esophagitis

## 2022-04-23 NOTE — PROGRESS NOTES
Patient for discharge today. No supportive care orders received or CM needs identified. Patient to return home. Family to transport. Care Management Interventions  PCP Verified by CM: Yes (Hima Coleman)  Mode of Transport at Discharge:  Other (see comment) (Family)  Transition of Care Consult (CM Consult): Discharge Planning  Discharge Durable Medical Equipment: No  Physical Therapy Consult: No  Occupational Therapy Consult: No  Speech Therapy Consult: No  Support Systems: Spouse/Significant Other,Child(kiara),Other Family Member(s),Friend/Neighbor,[employer]/[place] [employer]  Confirm Follow Up Transport: Family  The Plan for Transition of Care is Related to the Following Treatment Goals : Return home and back to her baseline  Discharge Location  Patient Expects to be Discharged to[de-identified] Home

## 2022-04-23 NOTE — DISCHARGE INSTRUCTIONS
Patient Education        Catheter Ablation: What to Expect at [address] [place] [address] Drive had a catheter ablation to try to correct a problem with your heartbeat (heart rhythm). You may have swelling, bruising, or a small lump around the sites where the catheters went into your body. These should go away in 3 to 4 weeks. You can do light activities at home. Don't do anything strenuous until your doctor says it is okay. This may be for several days. This care sheet gives you a general idea about how long it will take for you to recover. But each person recovers at a different pace. Follow the steps below to get better as quickly as possible. How can you care for yourself at home? Activity    · If the doctor gave you a sedative:  ? For 24 hours, don't do anything that requires attention to detail, such as going to work, making important decisions, or signing any legal documents. It takes time for the medicine's effects to completely wear off.  ? For your safety, do not drive or operate any machinery that could be dangerous. Wait until the medicine wears off and you can think clearly and react easily.     · Do not do strenuous exercise and do not lift, pull, or push anything heavy until your doctor says it is okay. This may be for several days.     · Ask your doctor when it is okay to have sex. Diet    · You can eat your normal diet. If your stomach is upset, try bland, low-fat foods like plain rice, broiled chicken, toast, and yogurt.     · Drink plenty of fluids (unless your doctor tells you not to). Medicines    · Your doctor will tell you if and when you can restart your medicines. You will also get instructions about taking any new medicines.     · If you take aspirin or some other blood thinner, ask your doctor if and when to start taking it again. Make sure that you understand exactly what your doctor wants you to do.     · Be safe with medicines. Take your medicines exactly as prescribed.  Call your doctor if you think you are having a problem with your medicine. Catheter site care    · For 1 or 2 days, keep a bandage over the spot where the catheter was inserted. The bandage probably will fall off in this time.     · Put ice or a cold pack on the area for 10 to 20 minutes at a time to help with soreness or swelling. Put a thin cloth between the ice and your skin.     · You may shower 24 to 48 hours after the procedure, if your doctor okays it. Pat the incision dry.     · Do not soak the catheter site until it is healed. Don't take a bath for 1 week, or until your doctor tells you it is okay.     · Watch for bleeding from the site. A small amount of blood (up to the size of a quarter) on the bandage can be normal.     · If you are bleeding, lie down and press on the area for 15 minutes to try to make it stop. If the bleeding does not stop, call your doctor or seek immediate medical care. Follow-up care is a key part of your treatment and safety. Be sure to make and go to all appointments, and call your doctor if you are having problems. It's also a good idea to know your test results and keep a list of the medicines you take. When should you call for help? Call 911  anytime you think you may need emergency care. For example, call if:    · You passed out (lost consciousness).     · You have symptoms of a heart attack. These may include:  ? Chest pain or pressure, or a strange feeling in the chest.  ? Sweating. ? Shortness of breath. ? Nausea or vomiting. ? Pain, pressure, or a strange feeling in the back, neck, jaw, or upper belly, or in one or both shoulders or arms. ? Lightheadedness or sudden weakness. ? A fast or irregular heartbeat. After you call 911, the  may tell you to chew 1 adult-strength or 2 to 4 low-dose aspirin. Wait for an ambulance. Do not try to drive yourself.     · You have symptoms of a stroke.  These may include:  ? Sudden numbness, tingling, weakness, or loss of movement in your face, arm, or leg, especially on only one side of your body. ? Sudden vision changes. ? Sudden trouble speaking. ? Sudden confusion or trouble understanding simple statements. ? Sudden problems with walking or balance. ? A sudden, severe headache that is different from past headaches. Call your doctor now or seek immediate medical care if:    · You are bleeding from the area where the catheter was put in your blood vessel.     · You have a fast-growing, painful lump at the catheter site.     · You have signs of infection, such as:  ? Increased pain, swelling, warmth, or redness. ? Red streaks leading from the catheter site. ? Pus draining from the catheter site. ? A fever.     · Your leg, arm, or hand is painful, looks blue, or feels cold, numb, or tingly. Watch closely for any changes in your health, and be sure to contact your doctor if you have any problems. Current as of: January 10, 2022               Content Version: 13.2  © 2006-2022 [employer]. Care instructions adapted under license by [employer] (which disclaims liability or warranty for this information). If you have questions about a medical condition or this instruction, always ask your healthcare professional. [place] [address] any warranty or liability for your use of this information.

## 2022-05-03 ENCOUNTER — RX ONLY (OUTPATIENT)
Age: 57
Setting detail: RX ONLY
End: 2022-05-03

## 2022-05-03 RX ORDER — TRIFAROTENE 50 UG/G
CREAM TOPICAL
Qty: 45 | Refills: 3 | Status: ERX | COMMUNITY
Start: 2022-05-03

## 2022-05-03 RX ORDER — MINOCYCLINE 40 MG/G
AEROSOL, FOAM TOPICAL
Qty: 30 | Refills: 3 | Status: ERX | COMMUNITY
Start: 2022-05-03

## 2022-06-01 ENCOUNTER — OFFICE VISIT (OUTPATIENT)
Dept: CARDIOLOGY CLINIC | Age: 57
End: 2022-06-01
Payer: COMMERCIAL

## 2022-06-01 VITALS
HEART RATE: 75 BPM | DIASTOLIC BLOOD PRESSURE: 64 MMHG | HEIGHT: 64 IN | BODY MASS INDEX: 34.15 KG/M2 | WEIGHT: 200 LBS | SYSTOLIC BLOOD PRESSURE: 120 MMHG

## 2022-06-01 DIAGNOSIS — I49.3 PVC (PREMATURE VENTRICULAR CONTRACTION): Primary | ICD-10-CM

## 2022-06-01 DIAGNOSIS — I10 PRIMARY HYPERTENSION: ICD-10-CM

## 2022-06-01 PROCEDURE — 99214 OFFICE O/P EST MOD 30 MIN: CPT | Performed by: INTERNAL MEDICINE

## 2022-06-01 PROCEDURE — 93000 ELECTROCARDIOGRAM COMPLETE: CPT | Performed by: INTERNAL MEDICINE

## 2022-06-01 RX ORDER — FLUTICASONE PROPIONATE 50 MCG
SPRAY, SUSPENSION (ML) NASAL
COMMUNITY
End: 2022-07-12

## 2022-06-01 RX ORDER — ESCITALOPRAM OXALATE 10 MG/1
TABLET ORAL
COMMUNITY
Start: 2022-05-11 | End: 2022-06-07 | Stop reason: SDUPTHER

## 2022-06-01 RX ORDER — ROSUVASTATIN CALCIUM 10 MG/1
TABLET, COATED ORAL
COMMUNITY
Start: 2022-05-11 | End: 2022-06-14 | Stop reason: SDUPTHER

## 2022-06-01 RX ORDER — MONTELUKAST SODIUM 10 MG/1
TABLET ORAL
COMMUNITY
Start: 2022-05-11 | End: 2022-06-07 | Stop reason: SDUPTHER

## 2022-06-01 RX ORDER — ALBUTEROL SULFATE 90 UG/1
AEROSOL, METERED RESPIRATORY (INHALATION)
COMMUNITY
Start: 2021-04-09

## 2022-06-01 RX ORDER — FLECAINIDE ACETATE 100 MG/1
100 TABLET ORAL 2 TIMES DAILY
Qty: 60 TABLET | Refills: 5 | Status: SHIPPED | OUTPATIENT
Start: 2022-06-01 | End: 2022-06-16 | Stop reason: SDUPTHER

## 2022-06-01 RX ORDER — ATENOLOL 50 MG/1
TABLET ORAL
COMMUNITY
Start: 2021-05-25 | End: 2022-06-07

## 2022-06-01 RX ORDER — LOSARTAN POTASSIUM 25 MG/1
TABLET ORAL
COMMUNITY
Start: 2022-05-12 | End: 2022-06-07 | Stop reason: SDUPTHER

## 2022-06-01 RX ORDER — ESOMEPRAZOLE MAGNESIUM 20 MG/1
20 FOR SUSPENSION ORAL DAILY
COMMUNITY
End: 2022-06-14

## 2022-06-01 RX ORDER — UMECLIDINIUM BROMIDE AND VILANTEROL TRIFENATATE 62.5; 25 UG/1; UG/1
POWDER RESPIRATORY (INHALATION)
COMMUNITY
Start: 2021-04-09 | End: 2022-06-14

## 2022-06-01 RX ORDER — VERAPAMIL HYDROCHLORIDE 180 MG/1
CAPSULE, EXTENDED RELEASE ORAL NIGHTLY
COMMUNITY
Start: 2022-05-31

## 2022-06-01 RX ORDER — BUPROPION HYDROCHLORIDE 150 MG/1
TABLET ORAL
COMMUNITY
Start: 2022-05-11 | End: 2022-06-07 | Stop reason: SDUPTHER

## 2022-06-01 NOTE — PROGRESS NOTES
[address], [address]  PHONE: [phone]  1965    SUBJECTIVE:   Marisa Baird is a 49 y.o. female seen for a follow up visit regarding the following:     Chief Complaint   Patient presents with   [address] from Hospital     1 month post pvc abl       HPI:    Marisa Baird is a very pleasant 49 y.o.  female with a past medical and cardiac history significant for minimal CAD, PVCs, HTN, HLD, s/p right sided PVC ablation with plans for left sided access, however, developed a thrombus on her CS catheter and ablation was aborted. She presented back for left sided PVC ablation. She continues to feel palpitations, but feels some improvement. Cardiac PMH: (Old records have been reviewed and summarized below)   TTE (12/17/21): EF 55%   Monitor (12/21): EF 55% PVCs   Cath (6/20): mild to moderate CAD      Reviewed office note Dr. Yifan Magana 12/21/21    Past Medical History, Past Surgical History, Family history, Social History, and Medications were all reviewed with the patient today and updated as necessary.      Current Outpatient Medications   Medication Sig Dispense Refill    albuterol sulfate  (90 Base) MCG/ACT inhaler INHALE 1 PUFF BY MOUTH EVERY 6 HOURS AS NEEDED FOR WHEEZING      atenolol (TENORMIN) 50 MG tablet       buPROPion (WELLBUTRIN XL) 150 MG extended release tablet TAKE 1 TABLET BY MOUTH ONCE DAILY IN THE MORNING      escitalopram (LEXAPRO) 10 MG tablet TAKE 1 TABLET BY MOUTH ONCE DAILY      losartan (COZAAR) 25 MG tablet TAKE 1 TABLET BY MOUTH ONCE DAILY      fluticasone (FLONASE) 50 MCG/ACT nasal spray by Nasal route      montelukast (SINGULAIR) 10 MG tablet TAKE 1 TABLET BY MOUTH ONCE DAILY FOR ALLERGIES AND FOR ASTHMA      rosuvastatin (CRESTOR) 10 MG tablet TAKE 1 TABLET BY MOUTH NIGHTLY      umeclidinium-vilanterol (ANORO ELLIPTA) 62.5-25 MCG/INH AEPB inhaler INHALE 1 PUFF BY MOUTH ONCE DAILY      verapamil (VERELAN) 180 MG extended release capsule       esomeprazole Magnesium (NEXIUM) 20 MG PACK Take 20 mg by mouth daily      flecainide (TAMBOCOR) 100 MG tablet Take 1 tablet by mouth 2 times daily 60 tablet 5     No current facility-administered medications for this visit. No Known Allergies  [unfilled]  Past Medical History:   Diagnosis Date    CAD (coronary artery disease)     follows with Cardiology    Gallstones 12/14/12    GERD (gastroesophageal reflux disease)     History of DVT (deep vein thrombosis)     History of pulmonary embolus (PE)     Hyperlipidemia     Hypertension     controlled with meds    BOSTON (obstructive sleep apnea)     Mouth piece    PVC (premature ventricular contraction)     Thromboembolus (HCC)     PE & DVT     Past Surgical History:   Procedure Laterality Date    BREAST LUMPECTOMY      benign    CARDIAC CATHETERIZATION      CHOLECYSTECTOMY, LAPAROSCOPIC      GYN      laparoscopies for endometriosis- x 4     Family History   Problem Relation Age of Onset    Other Maternal Grandfather         lymphoma    Pulmonary Embolism Paternal Grandfather     Pulmonary Embolism Other     Hypertension Mother     Diabetes Father     Other Paternal Grandfather         brain tumor     Social History     Tobacco Use    Smoking status: Former Smoker     Packs/day: 1.00     Quit date: 1/1/2007     Years since quitting: 15.4    Smokeless tobacco: Never Used   Substance Use Topics    Alcohol use:  Yes     Alcohol/week: 1.7 standard drinks       PHYSICAL EXAM:    /64   Pulse 75   Ht 5' 4\" (1.626 m)   Wt 200 lb (90.7 kg)   BMI 34.33 kg/m²      Wt Readings from Last 5 Encounters:   06/01/22 200 lb (90.7 kg)   04/06/22 198 lb (89.8 kg)   01/05/22 198 lb (89.8 kg)   12/21/21 196 lb (88.9 kg)   12/17/21 198 lb (89.8 kg)       BP Readings from Last 5 Encounters:   06/01/22 120/64   04/06/22 122/80   01/05/22 124/78   12/27/21 132/70   12/21/21 124/72       General appearance: Alert, well appearing, and in no distress   CV: RRR, no M/R/G, no JVD, normal distal pulses, no lower extremity edema  Pulm: Clear to auscultation, no wheezes, no crackles, no accessory muscle use  GI: Soft, nontender, nondistended  Neuro: Alert and oriented    Medical problems and test results were reviewed with the patient today. Lab Results   Component Value Date    K 3.4 04/23/2022     CREATININE   Date Value Ref Range Status   04/23/2022 0.70 0.6 - 1.0 MG/DL Final     Lab Results   Component Value Date    HGB 14.1 04/19/2022     No results found for: INR, PTMR, PT1    EKG: (EKG has been independently visualized by me with interpretation below)  NSR, PVCs    Tally Argenis was seen today for follow-up from hospital.    Diagnoses and all orders for this visit:    PVC (premature ventricular contraction)  -     EKG 12 lead  -     EKG 12 Lead; Future    Primary hypertension    Other orders  -     flecainide (TAMBOCOR) 100 MG tablet; Take 1 tablet by mouth 2 times daily        ASSESSMENT and PLAN  1. PVCs: some improvement s/p ablation, will try flecainide 100mg, EKG in 1 week, cont atenolol, further plan pending clinical course. 2. HTN: controlled, cont atenolol 50mg, losartan 25mg    Patient has been instructed and agrees to call our office with any issues or other concerns related to their cardiac condition(s) and/or complaint(s). No follow-up provider specified. Antonia Preciado MD, MS  Cardiology/Electrophysiology  6/1/2022  12:56 PM

## 2022-06-07 ENCOUNTER — OFFICE VISIT (OUTPATIENT)
Dept: FAMILY MEDICINE CLINIC | Facility: CLINIC | Age: 57
End: 2022-06-07
Payer: COMMERCIAL

## 2022-06-07 VITALS
HEIGHT: 64 IN | WEIGHT: 202.2 LBS | RESPIRATION RATE: 16 BRPM | TEMPERATURE: 98.6 F | OXYGEN SATURATION: 97 % | SYSTOLIC BLOOD PRESSURE: 120 MMHG | DIASTOLIC BLOOD PRESSURE: 76 MMHG | BODY MASS INDEX: 34.52 KG/M2 | HEART RATE: 78 BPM

## 2022-06-07 DIAGNOSIS — K20.90 ESOPHAGITIS: Primary | ICD-10-CM

## 2022-06-07 DIAGNOSIS — K44.9 HIATAL HERNIA: ICD-10-CM

## 2022-06-07 DIAGNOSIS — F41.1 GAD (GENERALIZED ANXIETY DISORDER): ICD-10-CM

## 2022-06-07 DIAGNOSIS — I10 PRIMARY HYPERTENSION: ICD-10-CM

## 2022-06-07 DIAGNOSIS — I26.99 OTHER ACUTE PULMONARY EMBOLISM, UNSPECIFIED WHETHER ACUTE COR PULMONALE PRESENT (HCC): ICD-10-CM

## 2022-06-07 DIAGNOSIS — J30.1 NON-SEASONAL ALLERGIC RHINITIS DUE TO POLLEN: ICD-10-CM

## 2022-06-07 DIAGNOSIS — K43.2 INCISIONAL HERNIA, WITHOUT OBSTRUCTION OR GANGRENE: ICD-10-CM

## 2022-06-07 PROCEDURE — 99214 OFFICE O/P EST MOD 30 MIN: CPT | Performed by: FAMILY MEDICINE

## 2022-06-07 RX ORDER — BACLOFEN 10 MG/1
10 TABLET ORAL 3 TIMES DAILY PRN
COMMUNITY

## 2022-06-07 RX ORDER — ESCITALOPRAM OXALATE 10 MG/1
TABLET ORAL
Qty: 90 TABLET | Refills: 1 | Status: SHIPPED | OUTPATIENT
Start: 2022-06-07

## 2022-06-07 RX ORDER — DOXYCYCLINE HYCLATE 100 MG/1
100 CAPSULE ORAL AS NEEDED
COMMUNITY

## 2022-06-07 RX ORDER — BUPROPION HYDROCHLORIDE 150 MG/1
TABLET ORAL
Qty: 90 TABLET | Refills: 1 | Status: SHIPPED | OUTPATIENT
Start: 2022-06-07

## 2022-06-07 RX ORDER — APIXABAN 5 MG/1
TABLET, FILM COATED ORAL
COMMUNITY
Start: 2022-05-17 | End: 2022-06-14

## 2022-06-07 RX ORDER — PANTOPRAZOLE SODIUM 40 MG/1
40 TABLET, DELAYED RELEASE ORAL
Qty: 90 TABLET | Refills: 1 | Status: SHIPPED | OUTPATIENT
Start: 2022-06-07

## 2022-06-07 RX ORDER — MONTELUKAST SODIUM 10 MG/1
TABLET ORAL
Qty: 90 TABLET | Refills: 1 | Status: SHIPPED | OUTPATIENT
Start: 2022-06-07

## 2022-06-07 RX ORDER — LOSARTAN POTASSIUM 25 MG/1
TABLET ORAL
Qty: 90 TABLET | Refills: 1 | Status: SHIPPED | OUTPATIENT
Start: 2022-06-07

## 2022-06-07 ASSESSMENT — PATIENT HEALTH QUESTIONNAIRE - PHQ9
SUM OF ALL RESPONSES TO PHQ QUESTIONS 1-9: 0
1. LITTLE INTEREST OR PLEASURE IN DOING THINGS: 0
2. FEELING DOWN, DEPRESSED OR HOPELESS: 0
SUM OF ALL RESPONSES TO PHQ QUESTIONS 1-9: 0
SUM OF ALL RESPONSES TO PHQ9 QUESTIONS 1 & 2: 0

## 2022-06-07 NOTE — PROGRESS NOTES
HISTORY OF PRESENT ILLNESS  Chief Complaint   Patient presents with    Gastroesophageal Reflux    Hiatal Hernia     Has had 2 heart ablations and then developed a blood clot on the instrument. Is feeling much better with the breathing    A lot of trouble with the reflux. Heartburn all the time. Dentist is complaining that it is wearing down her teeth. Previously on ranitidine for awhile and then they stopped that. Has a lot of acid in the middle of the night that wakes her up too. Using otc Nexium and gaviscon in the last week. Wt Readings from Last 3 Encounters:   06/14/22 201 lb (91.2 kg)   06/07/22 202 lb 3.2 oz (91.7 kg)   06/01/22 200 lb (90.7 kg)      BP Readings from Last 3 Encounters:   06/14/22 128/82   06/07/22 120/76   06/01/22 120/64        Gastroesophageal Reflux  She complains of coughing, heartburn and tooth decay. She reports no abdominal pain, no nausea or no wheezing. This is a chronic problem. The current episode started more than 1 year ago. The problem has been gradually worsening. The heartburn is of moderate intensity. The heartburn wakes her from sleep. The symptoms are aggravated by certain foods and lying down. Pertinent negatives include no anemia, fatigue, melena, muscle weakness, orthopnea or weight loss. Risk factors include hiatal hernia and obesity. She has tried an antacid, a histamine-2 antagonist and a PPI for the symptoms. The treatment provided moderate relief. Review of Systems   Constitutional: Negative for activity change, appetite change, chills, fatigue, fever and weight loss. HENT: Negative for congestion and rhinorrhea. Respiratory: Positive for cough. Negative for shortness of breath and wheezing. Gastrointestinal: Positive for heartburn. Negative for abdominal pain, constipation, diarrhea, melena, nausea and vomiting. Genitourinary: Negative for dysuria. Musculoskeletal: Negative for arthralgias, myalgias and muscle weakness. Neurological: Negative for dizziness and headaches. Psychiatric/Behavioral: Negative for dysphoric mood. The patient is not nervous/anxious. Patient Active Problem List   Diagnosis    Chest pain    HTN (hypertension)    Acute deep vein thrombosis (DVT) of popliteal vein of right lower extremity (HCC)    Endometriosis    Acute pulmonary embolism (HCC)    Allergic rhinitis    Obesity (BMI 30-39. 9)    Hypertension    History of pulmonary embolus (PE)    CAD (coronary artery disease)    Hyperlipidemia    Dyspnea    Esophagitis    Hypoxemia    PVC (premature ventricular contraction)    Malaise and fatigue    BOSTON (obstructive sleep apnea)    Dyslipidemia         Blood pressure 120/76, pulse 78, temperature 98.6 °F (37 °C), temperature source Temporal, resp. rate 16, height 5' 4\" (1.626 m), weight 202 lb 3.2 oz (91.7 kg), SpO2 97 %, not currently breastfeeding. Pain Scale: 0 - No pain/10 Pain Location:   Body mass index is 34.71 kg/m². Physical Exam  Vitals and nursing note reviewed. Constitutional:       General: She is not in acute distress. Appearance: Normal appearance. She is normal weight. She is not toxic-appearing. HENT:      Head: Normocephalic and atraumatic. Eyes:      General: Lids are normal.      Extraocular Movements: Extraocular movements intact. Conjunctiva/sclera: Conjunctivae normal.      Pupils: Pupils are equal, round, and reactive to light. Pupils are equal.   Cardiovascular:      Heart sounds: Normal heart sounds. No murmur heard. No friction rub. No gallop. Pulmonary:      Effort: Pulmonary effort is normal. No respiratory distress. Breath sounds: Normal breath sounds. No wheezing or rales. Abdominal:      General: Abdomen is flat. Bowel sounds are normal. There is no distension or abdominal bruit. There are no signs of injury. Palpations: Abdomen is soft. There is no hepatomegaly or mass. Tenderness:  There is no abdominal tenderness. Hernia: A hernia is present. Hernia is present in the ventral area. Skin:     General: Skin is warm and dry. Neurological:      General: No focal deficit present. Mental Status: She is alert. Psychiatric:         Mood and Affect: Mood normal.         Behavior: Behavior normal.         Thought Content: Thought content normal.         Judgment: Judgment normal.          ASSESSMENT and PLAN   Diagnosis Orders   1. Esophagitis  pantoprazole (PROTONIX) 40 MG tablet   2. Hiatal hernia  pantoprazole (PROTONIX) 40 MG tablet   3. Incisional hernia, without obstruction or gangrene  [facility]   4. Primary hypertension  losartan (COZAAR) 25 MG tablet   5. Other acute pulmonary embolism, unspecified whether acute cor pulmonale present ([address]     6. Non-seasonal allergic rhinitis due to pollen  montelukast (SINGULAIR) 10 MG tablet   7. JOSH (generalized anxiety disorder)  buPROPion (WELLBUTRIN XL) 150 MG extended release tablet    escitalopram (LEXAPRO) 10 MG tablet       Reviewed medications and side effects in detail    The patient's condition was discussed at length with the patient. The expected course and possible complications were reviewed. All questions were answered. Her other chronic conditions are stable at this time. She is stable on the current treatment and tolerating it well. No significant sides effects. Will not adjust therapy at this time, unless noted above. We will continue to monitor for any problems. Medications refilled and lab work has been ordered where needed. Reviewed medications are explained including any potential interactions or side effects in detail. The patient's questions were answered. She  understands the above and has no further questions. Further workup and treatment should be done if symptoms persist, worsen or new symptoms occur.  She  will call to notify us of any problems, complications or worsening symptoms. Follow-up and Dispositions    · Return in about 6 months (around 12/7/2022). There are no Patient Instructions on file for this visit. (Some details in this note may have been created with speech-recognition software. Some errors in speech recognition may have occurred.    Most of those have been corrected but some may have been missed.)         Raegan Edwards MD  [address] [facility]  Phone [phone]

## 2022-06-08 ENCOUNTER — NURSE ONLY (OUTPATIENT)
Dept: CARDIOLOGY CLINIC | Age: 57
End: 2022-06-08
Payer: COMMERCIAL

## 2022-06-08 DIAGNOSIS — I49.3 PVC (PREMATURE VENTRICULAR CONTRACTION): Primary | ICD-10-CM

## 2022-06-08 PROCEDURE — 93000 ELECTROCARDIOGRAM COMPLETE: CPT | Performed by: INTERNAL MEDICINE

## 2022-06-08 NOTE — PROGRESS NOTES
Patient came in for nurse visit. /72 and Pulse 76. EKG done and forwarded to Dr. Luke Nixon. Patient is not taking Atenolol anymore, but taking Flecainide and Verapamil. Patient wants to know if she still needs to take both Flecainide and Verapamil?

## 2022-06-14 ENCOUNTER — OFFICE VISIT (OUTPATIENT)
Dept: CARDIOLOGY CLINIC | Age: 57
End: 2022-06-14
Payer: COMMERCIAL

## 2022-06-14 VITALS
HEART RATE: 78 BPM | DIASTOLIC BLOOD PRESSURE: 82 MMHG | HEIGHT: 64 IN | BODY MASS INDEX: 34.31 KG/M2 | WEIGHT: 201 LBS | SYSTOLIC BLOOD PRESSURE: 128 MMHG

## 2022-06-14 DIAGNOSIS — I49.3 PVC (PREMATURE VENTRICULAR CONTRACTION): Primary | ICD-10-CM

## 2022-06-14 DIAGNOSIS — E78.5 HYPERLIPIDEMIA, UNSPECIFIED HYPERLIPIDEMIA TYPE: ICD-10-CM

## 2022-06-14 PROCEDURE — 99214 OFFICE O/P EST MOD 30 MIN: CPT | Performed by: INTERNAL MEDICINE

## 2022-06-14 RX ORDER — ROSUVASTATIN CALCIUM 10 MG/1
10 TABLET, COATED ORAL DAILY
Qty: 90 TABLET | Refills: 3 | Status: SHIPPED | OUTPATIENT
Start: 2022-06-14

## 2022-06-14 ASSESSMENT — ENCOUNTER SYMPTOMS
ABDOMINAL PAIN: 0
EYE PAIN: 0
STRIDOR: 0
APHONIA: 0
COUGH: 0
NAIL CHANGES: 0

## 2022-06-14 NOTE — PROGRESS NOTES
[address] [place] [address], [address] [facility], [address] [place] [address]  PHONE: [phone]    SUBJECTIVE:   Kimberli Martin is a 56 y.o. female 1965   seen for a follow up visit regarding the following:     Chief Complaint   Patient presents with    Hypertension     6 month f/u, monitor chart          History of present illness: 56 y.o. female  The patient presented for follow up 06/14/22. Status post right-sided PVC ablation with left-sided ablation that was aborted due to thrombus on CS catheter.     Interval Hx:  history of provoked DVT/PE following transatlantic flight on oral contraceptive therapy previously. Patient completed anticoagulation therapy for 6 months. She was seen by oncology with hypercoagulable work-up negative. She presented to /14/2020 with several day history of chest discomfort patient described a heaviness in the center of her chest.  Symptoms worse with activity additionally she described acute onset left chest wall pain 6/13/2020.     Additionally patient endorsed several month history of shortness of breath that was evaluated by her primary care physician with echocardiogram.  Breathlessness when taking part in minimal physical activities. Spirometry performed 2017 with mild restriction no obstructive disease identified     Cardiac history  1. 2017 spirometry mild restrictive defect  2. 2017 echocardiogram normal left ventricular systolic function with EF 50 to 55% RVSP not reported no major valvular disease  3. 2017 ECG sinus rhythm with occasional premature ventricular complexes  4. 2020 echocardiogram normal for age   11. 6/14/2020 CT PE - for pulmonary embolus  6. 6/14/2020 ECG sinus rhythm with frequent premature ventricular beats  7. 6/2020 cath mild to moderate non obstructive disease   12. 11/10/2021 sinus rhythm, normal rate, normal WA intervals, ST wave normal, normal axis, frequent PVCs  13.  BOSTON work up   14. 2022 PVC ablation  15. 2022 initiated on flecainide     Assessment  1. History of DVT  · Occurred in the setting of transatlantic flight on oral contraceptive therapy  · Oncology work-up performed  · Discussed E[employer] data versus use of NOAC prior to planned trips [patient frequently travels]  2. CAD mild cath 6/2020   3. PVCs   · frequent ventricular ectopic beats high burden of PVCs which could be causative of her shortness of breath. · PVC ablation with improvement of symptoms  · On flecainide previous ECG shows stable QTC on concomitant Lexapro  3. Hypertension   · Uncontrolled  Key CAD CHF Meds          losartan (COZAAR) 25 MG tablet (Taking)    Sig: TAKE 1 TABLET BY MOUTH ONCE DAILY    rosuvastatin (CRESTOR) 10 MG tablet (Taking)    Class: Historical Med    verapamil (VERELAN) 180 MG extended release capsule (Taking)    Class: Historical Med          4. Hyperlipidemia   5. obstructive sleep apnea   · Oral appliance               Past Medical History, Past Surgical History, Family history, Social History, and Medications were all reviewed with the patient today and updated as necessary.        No Known Allergies  Past Medical History:   Diagnosis Date    CAD (coronary artery disease)     follows with Cardiology    Gallstones 12/14/12    GERD (gastroesophageal reflux disease)     History of DVT (deep vein thrombosis)     History of pulmonary embolus (PE)     Hyperlipidemia     Hypertension     controlled with meds    BOSTON (obstructive sleep apnea)     Mouth piece    PVC (premature ventricular contraction)     Thromboembolus (HCC)     PE & DVT     Past Surgical History:   Procedure Laterality Date    BREAST LUMPECTOMY      benign    CARDIAC CATHETERIZATION      CHOLECYSTECTOMY, LAPAROSCOPIC      GYN      laparoscopies for endometriosis- x 4     Family History   Problem Relation Age of Onset    Other Maternal Grandfather         lymphoma    Pulmonary Embolism Paternal Grandfather     Pulmonary Embolism Other     Hypertension Mother     Diabetes Father     Other Paternal Grandfather         brain tumor      Social History     Tobacco Use    Smoking status: Former Smoker     Packs/day: 1.00     Quit date: 1/1/2007     Years since quitting: 15.4    Smokeless tobacco: Never Used   Substance Use Topics    Alcohol use: Yes     Alcohol/week: 1.7 standard drinks       ROS:    Review of Systems   Constitutional: Negative for fever. HENT: Negative for stridor. Eyes: Negative for pain. Cardiovascular: Negative for chest pain. Respiratory: Negative for cough. Endocrine: Negative for cold intolerance. Skin: Negative for nail changes. Musculoskeletal: Negative for arthritis. Gastrointestinal: Negative for abdominal pain. Genitourinary: Negative for dysuria. Neurological: Negative for aphonia. Psychiatric/Behavioral: Negative for altered mental status. Allergic/Immunologic: Negative for hives. PHYSICAL EXAM:    /82   Pulse 78   Ht 5' 4\" (1.626 m)   Wt 201 lb (91.2 kg)   BMI 34.50 kg/m²        Wt Readings from Last 3 Encounters:   06/14/22 201 lb (91.2 kg)   06/07/22 202 lb 3.2 oz (91.7 kg)   06/01/22 200 lb (90.7 kg)     BP Readings from Last 3 Encounters:   06/14/22 128/82   06/07/22 120/76   06/01/22 120/64         Physical Exam  Vitals reviewed. HENT:      Head: Normocephalic. Right Ear: External ear normal.      Left Ear: External ear normal.      Nose: Nose normal.   Eyes:      General: No scleral icterus. Pulmonary:      Effort: Pulmonary effort is normal.   Abdominal:      General: There is no distension. Musculoskeletal:      Cervical back: Neck supple. Skin:     General: Skin is warm. Neurological:      Mental Status: She is alert. Mental status is at baseline. Medical problems and test results were reviewed with the patient today. No results found for this or any previous visit (from the past 672 hour(s)).   Lab Results   Component Value Date CHOL 145 12/01/2021    HDL 60 12/01/2021    VLDL 17 12/01/2021              SHAKIR Franco was seen today for hypertension. Diagnoses and all orders for this visit:    PVC (premature ventricular contraction)    Hyperlipidemia, unspecified hyperlipidemia type    Other orders  -     rosuvastatin (CRESTOR) 10 MG tablet; Take 1 tablet by mouth daily         Return in about 1 year (around 6/14/2023).         Nikko Mcintosh MD  6/14/2022  2:31 PM

## 2022-06-15 ASSESSMENT — ENCOUNTER SYMPTOMS
WHEEZING: 0
VOMITING: 0
RHINORRHEA: 0
SHORTNESS OF BREATH: 0
COUGH: 1
NAUSEA: 0
HEARTBURN: 1
CONSTIPATION: 0
ABDOMINAL PAIN: 0
DIARRHEA: 0

## 2022-06-16 RX ORDER — FLECAINIDE ACETATE 100 MG/1
100 TABLET ORAL 2 TIMES DAILY
Qty: 180 TABLET | Refills: 1 | Status: SHIPPED | OUTPATIENT
Start: 2022-06-16

## 2022-06-16 NOTE — TELEPHONE ENCOUNTER
Requested Prescriptions     Signed Prescriptions Disp Refills    flecainide (TAMBOCOR) 100 MG tablet 180 tablet 1     Sig: Take 1 tablet by mouth 2 times daily     Authorizing Provider: Jimbo Martinez     Ordering User: Richie Russ

## 2022-06-16 NOTE — TELEPHONE ENCOUNTER
Pt called ask for a new Rx to be called in for Flecainide 100 mg 2 pills a day 90 day supply called into [facility] [phone]

## 2022-06-16 NOTE — TELEPHONE ENCOUNTER
Medication prescribed by Dr. Chidi Ayala according to his last note flecainide 100 mg  1 tablet p.o. twice daily [intended to be taken approximately 12 hours apart]

## 2022-07-12 DIAGNOSIS — J30.1 NON-SEASONAL ALLERGIC RHINITIS DUE TO POLLEN: Primary | ICD-10-CM

## 2022-07-12 RX ORDER — FLUTICASONE PROPIONATE 50 MCG
SPRAY, SUSPENSION (ML) NASAL
Qty: 16 G | Status: CANCELLED | OUTPATIENT
Start: 2022-07-12

## 2022-07-12 RX ORDER — FLUTICASONE PROPIONATE 50 MCG
2 SPRAY, SUSPENSION (ML) NASAL DAILY
Qty: 48 G | Refills: 1 | Status: SHIPPED | OUTPATIENT
Start: 2022-07-12

## 2022-07-12 NOTE — TELEPHONE ENCOUNTER
Prescription(s) refilled  It (they) has been escribed to the pharmacy. Requested Prescriptions     Signed Prescriptions Disp Refills    fluticasone (FLONASE) 50 MCG/ACT nasal spray 48 g 1     Si sprays by Each Nostril route daily     Authorizing Provider: JOSH ROCKWELL     No orders of the defined types were placed in this encounter.           ICD-10-CM    1. Non-seasonal allergic rhinitis due to pollen  J30.1 fluticasone (FLONASE) 50 MCG/ACT nasal spray

## 2022-07-28 ENCOUNTER — OFFICE VISIT (OUTPATIENT)
Dept: SURGERY | Age: 57
End: 2022-07-28
Payer: COMMERCIAL

## 2022-07-28 ENCOUNTER — PREP FOR PROCEDURE (OUTPATIENT)
Dept: SURGERY | Age: 57
End: 2022-07-28

## 2022-07-28 VITALS
SYSTOLIC BLOOD PRESSURE: 128 MMHG | OXYGEN SATURATION: 97 % | WEIGHT: 201 LBS | DIASTOLIC BLOOD PRESSURE: 78 MMHG | BODY MASS INDEX: 34.31 KG/M2 | HEART RATE: 73 BPM | HEIGHT: 64 IN

## 2022-07-28 DIAGNOSIS — K43.2 INCISIONAL HERNIA, WITHOUT OBSTRUCTION OR GANGRENE: ICD-10-CM

## 2022-07-28 PROBLEM — K43.9 EPIGASTRIC HERNIA: Status: ACTIVE | Noted: 2022-07-28

## 2022-07-28 PROCEDURE — 99204 OFFICE O/P NEW MOD 45 MIN: CPT | Performed by: STUDENT IN AN ORGANIZED HEALTH CARE EDUCATION/TRAINING PROGRAM

## 2022-08-10 ENCOUNTER — TELEPHONE (OUTPATIENT)
Dept: FAMILY MEDICINE CLINIC | Facility: CLINIC | Age: 57
End: 2022-08-10

## 2022-08-10 DIAGNOSIS — J30.1 NON-SEASONAL ALLERGIC RHINITIS DUE TO POLLEN: Primary | ICD-10-CM

## 2022-08-10 RX ORDER — OLOPATADINE HYDROCHLORIDE 665 UG/1
2 SPRAY NASAL 2 TIMES DAILY
Qty: 1 EACH | Refills: 5 | Status: SHIPPED | OUTPATIENT
Start: 2022-08-10

## 2022-08-10 NOTE — TELEPHONE ENCOUNTER
Pt called stating that she just got back from [facility]. Everyone that she went with has tested positive for COVID. States that her test is coming back negative. She is having some congestion. Asking for something to be sent into the pharmacy for her.

## 2022-08-10 NOTE — TELEPHONE ENCOUNTER
She may want repeat the test in a couple days. I would probably use some Allegra and double up on your Flonase to help with congestion. Prescription(s) refilled  It (they) has been escribed to the pharmacy. Requested Prescriptions     Signed Prescriptions Disp Refills    olopatadine (PATANASE) 0.6 % SOLN nassl soln 1 each 5     Si sprays by Nasal route in the morning and 2 sprays before bedtime. Authorizing Provider: JOSH ROCKWELL     No orders of the defined types were placed in this encounter.           ICD-10-CM    1. Non-seasonal allergic rhinitis due to pollen  J30.1 olopatadine (PATANASE) 0.6 % SOLN nassl soln

## 2022-10-04 NOTE — PERIOP NOTE
Patient verified name and . Order for consent not found in EHR and matches case posting; patient verifies procedure. Type II surgery, phone assessment complete. Orders not received. Labs per surgeon: none  Labs per anesthesia protocol: Hgb (patient coming to [facility] lab 10/5/22, chart flagged for result f/u)    Patient answered medical/surgical history questions at their best of ability. All prior to admission medications documented in [facility]. Patient instructed to take the following medications the day of surgery according to anesthesia guidelines with a small sip of water:  flecainide (Tambocor) On the day before surgery please take Acetaminophen 1000mg in the morning and then again before bed. You may substitute for Tylenol 650 mg. Hold all vitamins 7 days prior to surgery and NSAIDS 5 days prior to surgery. Prescription meds to hold:baclofen (Lioresal)   Patient instructed on the following:    > Arrive at [facility], time of arrival to be called the day before by 1700  > NPO after midnight, unless otherwise indicated, including gum, mints, and ice chips  > Responsible adult must drive patient to the hospital, stay during surgery, and patient will need supervision 24 hours after anesthesia  > Use antibacterial soap  in shower the night before surgery and on the morning of surgery  > All piercings must be removed prior to arrival.    > Leave all valuables (money and jewelry) at home but bring insurance card and ID on DOS.   > You may be required to pay a deductible or co-pay on the day of your procedure. You can pre-pay by calling [phone] if your surgery is at the [facility] or [phone] if your surgery is at the [facility]. > Do not wear make-up, nail polish, lotions, cologne, perfumes, powders, or oil on skin. Artificial nails are not permitted.

## 2022-10-05 ENCOUNTER — HOSPITAL ENCOUNTER (OUTPATIENT)
Dept: LAB | Age: 57
Discharge: HOME | End: 2022-10-08
Payer: COMMERCIAL

## 2022-10-05 LAB — HGB BLD-MCNC: 13.7 G/DL (ref 11.7–15.4)

## 2022-10-05 PROCEDURE — 36415 COLL VENOUS BLD VENIPUNCTURE: CPT

## 2022-10-05 PROCEDURE — 85018 HEMOGLOBIN: CPT

## 2022-10-06 ENCOUNTER — ANESTHESIA EVENT (OUTPATIENT)
Dept: SURGERY | Age: 57
End: 2022-10-06
Payer: COMMERCIAL

## 2022-10-06 ASSESSMENT — ENCOUNTER SYMPTOMS: SHORTNESS OF BREATH: 1

## 2022-10-06 NOTE — PERIOP NOTE
Directly informed patient and or family member of pre op arrival time 0 on 10/7. All questions answered. Pre op instructions reviewed. Left contact information for any additional questions or needs.

## 2022-10-07 ENCOUNTER — HOSPITAL ENCOUNTER (OUTPATIENT)
Age: 57
Setting detail: OUTPATIENT SURGERY
Discharge: HOME | End: 2022-10-07
Attending: STUDENT IN AN ORGANIZED HEALTH CARE EDUCATION/TRAINING PROGRAM | Admitting: STUDENT IN AN ORGANIZED HEALTH CARE EDUCATION/TRAINING PROGRAM
Payer: COMMERCIAL

## 2022-10-07 ENCOUNTER — ANESTHESIA (OUTPATIENT)
Dept: SURGERY | Age: 57
End: 2022-10-07
Payer: COMMERCIAL

## 2022-10-07 VITALS
SYSTOLIC BLOOD PRESSURE: 109 MMHG | OXYGEN SATURATION: 96 % | RESPIRATION RATE: 16 BRPM | WEIGHT: 202.4 LBS | TEMPERATURE: 98.5 F | HEIGHT: 64 IN | BODY MASS INDEX: 34.56 KG/M2 | HEART RATE: 91 BPM | DIASTOLIC BLOOD PRESSURE: 55 MMHG

## 2022-10-07 DIAGNOSIS — K43.2 INCISIONAL HERNIA, WITHOUT OBSTRUCTION OR GANGRENE: Primary | ICD-10-CM

## 2022-10-07 DIAGNOSIS — K43.9 EPIGASTRIC HERNIA: ICD-10-CM

## 2022-10-07 LAB — HCG UR QL: NEGATIVE

## 2022-10-07 PROCEDURE — 6360000002 HC RX W HCPCS: Performed by: ANESTHESIOLOGY

## 2022-10-07 PROCEDURE — S2900 ROBOTIC SURGICAL SYSTEM: HCPCS | Performed by: STUDENT IN AN ORGANIZED HEALTH CARE EDUCATION/TRAINING PROGRAM

## 2022-10-07 PROCEDURE — 7100000000 HC PACU RECOVERY - FIRST 15 MIN: Performed by: STUDENT IN AN ORGANIZED HEALTH CARE EDUCATION/TRAINING PROGRAM

## 2022-10-07 PROCEDURE — 2580000003 HC RX 258: Performed by: NURSE ANESTHETIST, CERTIFIED REGISTERED

## 2022-10-07 PROCEDURE — 49653 PR LAP, VENTRAL HERNIA REPAIR,INCARCERATED: CPT | Performed by: STUDENT IN AN ORGANIZED HEALTH CARE EDUCATION/TRAINING PROGRAM

## 2022-10-07 PROCEDURE — 81025 URINE PREGNANCY TEST: CPT

## 2022-10-07 PROCEDURE — 3600000009 HC SURGERY ROBOT BASE: Performed by: STUDENT IN AN ORGANIZED HEALTH CARE EDUCATION/TRAINING PROGRAM

## 2022-10-07 PROCEDURE — 7100000001 HC PACU RECOVERY - ADDTL 15 MIN: Performed by: STUDENT IN AN ORGANIZED HEALTH CARE EDUCATION/TRAINING PROGRAM

## 2022-10-07 PROCEDURE — 3700000000 HC ANESTHESIA ATTENDED CARE: Performed by: STUDENT IN AN ORGANIZED HEALTH CARE EDUCATION/TRAINING PROGRAM

## 2022-10-07 PROCEDURE — 6370000000 HC RX 637 (ALT 250 FOR IP): Performed by: ANESTHESIOLOGY

## 2022-10-07 PROCEDURE — 2500000003 HC RX 250 WO HCPCS: Performed by: STUDENT IN AN ORGANIZED HEALTH CARE EDUCATION/TRAINING PROGRAM

## 2022-10-07 PROCEDURE — 3700000001 HC ADD 15 MINUTES (ANESTHESIA): Performed by: STUDENT IN AN ORGANIZED HEALTH CARE EDUCATION/TRAINING PROGRAM

## 2022-10-07 PROCEDURE — 2500000003 HC RX 250 WO HCPCS: Performed by: NURSE ANESTHETIST, CERTIFIED REGISTERED

## 2022-10-07 PROCEDURE — 3600000019 HC SURGERY ROBOT ADDTL 15MIN: Performed by: STUDENT IN AN ORGANIZED HEALTH CARE EDUCATION/TRAINING PROGRAM

## 2022-10-07 PROCEDURE — 6360000002 HC RX W HCPCS: Performed by: STUDENT IN AN ORGANIZED HEALTH CARE EDUCATION/TRAINING PROGRAM

## 2022-10-07 PROCEDURE — 7100000011 HC PHASE II RECOVERY - ADDTL 15 MIN: Performed by: STUDENT IN AN ORGANIZED HEALTH CARE EDUCATION/TRAINING PROGRAM

## 2022-10-07 PROCEDURE — 2709999900 HC NON-CHARGEABLE SUPPLY: Performed by: STUDENT IN AN ORGANIZED HEALTH CARE EDUCATION/TRAINING PROGRAM

## 2022-10-07 PROCEDURE — C1781 MESH (IMPLANTABLE): HCPCS | Performed by: STUDENT IN AN ORGANIZED HEALTH CARE EDUCATION/TRAINING PROGRAM

## 2022-10-07 PROCEDURE — 6360000002 HC RX W HCPCS: Performed by: NURSE ANESTHETIST, CERTIFIED REGISTERED

## 2022-10-07 PROCEDURE — 7100000010 HC PHASE II RECOVERY - FIRST 15 MIN: Performed by: STUDENT IN AN ORGANIZED HEALTH CARE EDUCATION/TRAINING PROGRAM

## 2022-10-07 DEVICE — MESH HERN DIA4.5IN CIR W/ ECHO PS POS SYS VENTRALIGHT ST: Type: IMPLANTABLE DEVICE | Site: ABDOMEN | Status: FUNCTIONAL

## 2022-10-07 RX ORDER — OXYCODONE HYDROCHLORIDE AND ACETAMINOPHEN 5; 325 MG/1; MG/1
1 TABLET ORAL EVERY 4 HOURS PRN
Qty: 15 TABLET | Refills: 0 | Status: SHIPPED | OUTPATIENT
Start: 2022-10-07 | End: 2022-10-12

## 2022-10-07 RX ORDER — ESMOLOL HYDROCHLORIDE 10 MG/ML
INJECTION INTRAVENOUS PRN
Status: DISCONTINUED | OUTPATIENT
Start: 2022-10-07 | End: 2022-10-07 | Stop reason: SDUPTHER

## 2022-10-07 RX ORDER — SCOLOPAMINE TRANSDERMAL SYSTEM 1 MG/1
1 PATCH, EXTENDED RELEASE TRANSDERMAL
Status: DISCONTINUED | OUTPATIENT
Start: 2022-10-07 | End: 2022-10-07 | Stop reason: HOSPADM

## 2022-10-07 RX ORDER — GLYCOPYRROLATE 0.2 MG/ML
INJECTION INTRAMUSCULAR; INTRAVENOUS PRN
Status: DISCONTINUED | OUTPATIENT
Start: 2022-10-07 | End: 2022-10-07 | Stop reason: SDUPTHER

## 2022-10-07 RX ORDER — EPHEDRINE SULFATE/0.9% NACL/PF 50 MG/5 ML
SYRINGE (ML) INTRAVENOUS PRN
Status: DISCONTINUED | OUTPATIENT
Start: 2022-10-07 | End: 2022-10-07 | Stop reason: SDUPTHER

## 2022-10-07 RX ORDER — HYDROMORPHONE HCL 110MG/55ML
0.5 PATIENT CONTROLLED ANALGESIA SYRINGE INTRAVENOUS
Status: COMPLETED | OUTPATIENT
Start: 2022-10-07 | End: 2022-10-07

## 2022-10-07 RX ORDER — OXYCODONE HYDROCHLORIDE 5 MG/1
5 TABLET ORAL
Status: COMPLETED | OUTPATIENT
Start: 2022-10-07 | End: 2022-10-07

## 2022-10-07 RX ORDER — KETOROLAC TROMETHAMINE 30 MG/ML
30 INJECTION, SOLUTION INTRAMUSCULAR; INTRAVENOUS ONCE
Status: DISCONTINUED | OUTPATIENT
Start: 2022-10-07 | End: 2022-10-07 | Stop reason: HOSPADM

## 2022-10-07 RX ORDER — ROCURONIUM BROMIDE 10 MG/ML
INJECTION, SOLUTION INTRAVENOUS PRN
Status: DISCONTINUED | OUTPATIENT
Start: 2022-10-07 | End: 2022-10-07 | Stop reason: SDUPTHER

## 2022-10-07 RX ORDER — HYDROMORPHONE HYDROCHLORIDE 2 MG/ML
0.5 INJECTION, SOLUTION INTRAMUSCULAR; INTRAVENOUS; SUBCUTANEOUS EVERY 10 MIN PRN
Status: COMPLETED | OUTPATIENT
Start: 2022-10-07 | End: 2022-10-07

## 2022-10-07 RX ORDER — ONDANSETRON 2 MG/ML
INJECTION INTRAMUSCULAR; INTRAVENOUS PRN
Status: DISCONTINUED | OUTPATIENT
Start: 2022-10-07 | End: 2022-10-07 | Stop reason: SDUPTHER

## 2022-10-07 RX ORDER — ONDANSETRON 2 MG/ML
4 INJECTION INTRAMUSCULAR; INTRAVENOUS
Status: DISCONTINUED | OUTPATIENT
Start: 2022-10-07 | End: 2022-10-07 | Stop reason: HOSPADM

## 2022-10-07 RX ORDER — MIDAZOLAM HYDROCHLORIDE 2 MG/2ML
2 INJECTION, SOLUTION INTRAMUSCULAR; INTRAVENOUS
Status: DISCONTINUED | OUTPATIENT
Start: 2022-10-07 | End: 2022-10-07 | Stop reason: HOSPADM

## 2022-10-07 RX ORDER — FENTANYL CITRATE 50 UG/ML
50 INJECTION, SOLUTION INTRAMUSCULAR; INTRAVENOUS EVERY 5 MIN PRN
Status: COMPLETED | OUTPATIENT
Start: 2022-10-07 | End: 2022-10-07

## 2022-10-07 RX ORDER — METHOCARBAMOL 750 MG/1
750 TABLET, FILM COATED ORAL 4 TIMES DAILY
Qty: 40 TABLET | Refills: 0 | Status: SHIPPED | OUTPATIENT
Start: 2022-10-07 | End: 2022-10-17

## 2022-10-07 RX ORDER — NEOSTIGMINE METHYLSULFATE 1 MG/ML
INJECTION, SOLUTION INTRAVENOUS PRN
Status: DISCONTINUED | OUTPATIENT
Start: 2022-10-07 | End: 2022-10-07 | Stop reason: SDUPTHER

## 2022-10-07 RX ORDER — DOCUSATE SODIUM 100 MG/1
100 CAPSULE, LIQUID FILLED ORAL 2 TIMES DAILY
Qty: 60 CAPSULE | Refills: 0 | Status: SHIPPED | OUTPATIENT
Start: 2022-10-07 | End: 2022-11-06

## 2022-10-07 RX ORDER — SODIUM CHLORIDE, SODIUM LACTATE, POTASSIUM CHLORIDE, CALCIUM CHLORIDE 600; 310; 30; 20 MG/100ML; MG/100ML; MG/100ML; MG/100ML
INJECTION, SOLUTION INTRAVENOUS CONTINUOUS PRN
Status: DISCONTINUED | OUTPATIENT
Start: 2022-10-07 | End: 2022-10-07 | Stop reason: SDUPTHER

## 2022-10-07 RX ORDER — FENTANYL CITRATE 50 UG/ML
INJECTION, SOLUTION INTRAMUSCULAR; INTRAVENOUS PRN
Status: DISCONTINUED | OUTPATIENT
Start: 2022-10-07 | End: 2022-10-07 | Stop reason: SDUPTHER

## 2022-10-07 RX ORDER — LIDOCAINE HYDROCHLORIDE 20 MG/ML
INJECTION, SOLUTION EPIDURAL; INFILTRATION; INTRACAUDAL; PERINEURAL PRN
Status: DISCONTINUED | OUTPATIENT
Start: 2022-10-07 | End: 2022-10-07 | Stop reason: SDUPTHER

## 2022-10-07 RX ORDER — DEXAMETHASONE SODIUM PHOSPHATE 4 MG/ML
INJECTION, SOLUTION INTRA-ARTICULAR; INTRALESIONAL; INTRAMUSCULAR; INTRAVENOUS; SOFT TISSUE PRN
Status: DISCONTINUED | OUTPATIENT
Start: 2022-10-07 | End: 2022-10-07 | Stop reason: SDUPTHER

## 2022-10-07 RX ORDER — LIDOCAINE HYDROCHLORIDE AND EPINEPHRINE 10; 10 MG/ML; UG/ML
INJECTION, SOLUTION INFILTRATION; PERINEURAL PRN
Status: DISCONTINUED | OUTPATIENT
Start: 2022-10-07 | End: 2022-10-07 | Stop reason: HOSPADM

## 2022-10-07 RX ORDER — ACETAMINOPHEN 500 MG
1000 TABLET ORAL ONCE
Status: COMPLETED | OUTPATIENT
Start: 2022-10-07 | End: 2022-10-07

## 2022-10-07 RX ORDER — HALOPERIDOL 5 MG/ML
1 INJECTION INTRAMUSCULAR
Status: COMPLETED | OUTPATIENT
Start: 2022-10-07 | End: 2022-10-07

## 2022-10-07 RX ORDER — BUPIVACAINE HYDROCHLORIDE 2.5 MG/ML
INJECTION, SOLUTION EPIDURAL; INFILTRATION; INTRACAUDAL PRN
Status: DISCONTINUED | OUTPATIENT
Start: 2022-10-07 | End: 2022-10-07 | Stop reason: HOSPADM

## 2022-10-07 RX ORDER — PROPOFOL 10 MG/ML
INJECTION, EMULSION INTRAVENOUS PRN
Status: DISCONTINUED | OUTPATIENT
Start: 2022-10-07 | End: 2022-10-07 | Stop reason: SDUPTHER

## 2022-10-07 RX ORDER — LIDOCAINE HYDROCHLORIDE 10 MG/ML
1 INJECTION, SOLUTION INFILTRATION; PERINEURAL
Status: DISCONTINUED | OUTPATIENT
Start: 2022-10-07 | End: 2022-10-07 | Stop reason: HOSPADM

## 2022-10-07 RX ADMIN — LIDOCAINE HYDROCHLORIDE 100 MG: 20 INJECTION, SOLUTION EPIDURAL; INFILTRATION; INTRACAUDAL; PERINEURAL at 07:47

## 2022-10-07 RX ADMIN — SODIUM CHLORIDE, SODIUM LACTATE, POTASSIUM CHLORIDE, AND CALCIUM CHLORIDE: 600; 310; 30; 20 INJECTION, SOLUTION INTRAVENOUS at 07:39

## 2022-10-07 RX ADMIN — Medication 2000 MG: at 07:53

## 2022-10-07 RX ADMIN — HYDROMORPHONE HYDROCHLORIDE 0.5 MG: 2 INJECTION, SOLUTION INTRAMUSCULAR; INTRAVENOUS; SUBCUTANEOUS at 10:10

## 2022-10-07 RX ADMIN — HYDROMORPHONE HYDROCHLORIDE 0.5 MG: 2 INJECTION, SOLUTION INTRAMUSCULAR; INTRAVENOUS; SUBCUTANEOUS at 10:15

## 2022-10-07 RX ADMIN — ONDANSETRON 4 MG: 2 INJECTION INTRAMUSCULAR; INTRAVENOUS at 07:56

## 2022-10-07 RX ADMIN — HYDROMORPHONE HYDROCHLORIDE 0.5 MG: 2 INJECTION, SOLUTION INTRAMUSCULAR; INTRAVENOUS; SUBCUTANEOUS at 09:55

## 2022-10-07 RX ADMIN — FENTANYL CITRATE 50 MCG: 50 INJECTION, SOLUTION INTRAMUSCULAR; INTRAVENOUS at 09:42

## 2022-10-07 RX ADMIN — DEXAMETHASONE SODIUM PHOSPHATE 4 MG: 4 INJECTION, SOLUTION INTRAMUSCULAR; INTRAVENOUS at 08:13

## 2022-10-07 RX ADMIN — OXYCODONE 5 MG: 5 TABLET ORAL at 10:22

## 2022-10-07 RX ADMIN — PROPOFOL 50 MG: 10 INJECTION, EMULSION INTRAVENOUS at 07:47

## 2022-10-07 RX ADMIN — Medication 10 MG: at 08:05

## 2022-10-07 RX ADMIN — Medication 4 MG: at 09:04

## 2022-10-07 RX ADMIN — SODIUM CHLORIDE, SODIUM LACTATE, POTASSIUM CHLORIDE, AND CALCIUM CHLORIDE: 600; 310; 30; 20 INJECTION, SOLUTION INTRAVENOUS at 08:30

## 2022-10-07 RX ADMIN — HYDROMORPHONE HYDROCHLORIDE 0.5 MG: 2 INJECTION, SOLUTION INTRAMUSCULAR; INTRAVENOUS; SUBCUTANEOUS at 09:50

## 2022-10-07 RX ADMIN — FENTANYL CITRATE 50 MCG: 50 INJECTION, SOLUTION INTRAMUSCULAR; INTRAVENOUS at 07:43

## 2022-10-07 RX ADMIN — HYDROMORPHONE HYDROCHLORIDE 0.5 MG: 2 INJECTION, SOLUTION INTRAMUSCULAR; INTRAVENOUS; SUBCUTANEOUS at 09:45

## 2022-10-07 RX ADMIN — HALOPERIDOL LACTATE 1 MG: 5 INJECTION, SOLUTION INTRAMUSCULAR at 10:19

## 2022-10-07 RX ADMIN — ROCURONIUM BROMIDE 40 MG: 50 INJECTION, SOLUTION INTRAVENOUS at 07:43

## 2022-10-07 RX ADMIN — PROPOFOL 200 MG: 10 INJECTION, EMULSION INTRAVENOUS at 07:43

## 2022-10-07 RX ADMIN — GLYCOPYRROLATE 0.6 MG: 0.2 INJECTION, SOLUTION INTRAMUSCULAR; INTRAVENOUS at 09:04

## 2022-10-07 RX ADMIN — HYDROMORPHONE HYDROCHLORIDE 0.5 MG: 2 INJECTION, SOLUTION INTRAMUSCULAR; INTRAVENOUS; SUBCUTANEOUS at 10:03

## 2022-10-07 RX ADMIN — HYDROMORPHONE HYDROCHLORIDE 0.5 MG: 2 INJECTION, SOLUTION INTRAMUSCULAR; INTRAVENOUS; SUBCUTANEOUS at 09:26

## 2022-10-07 RX ADMIN — ESMOLOL HYDROCHLORIDE 30 MG: 10 INJECTION INTRAVENOUS at 07:57

## 2022-10-07 RX ADMIN — FENTANYL CITRATE 50 MCG: 50 INJECTION, SOLUTION INTRAMUSCULAR; INTRAVENOUS at 09:37

## 2022-10-07 RX ADMIN — FENTANYL CITRATE 50 MCG: 50 INJECTION, SOLUTION INTRAMUSCULAR; INTRAVENOUS at 08:50

## 2022-10-07 RX ADMIN — ACETAMINOPHEN 1000 MG: 500 TABLET, FILM COATED ORAL at 06:06

## 2022-10-07 RX ADMIN — Medication 10 MG: at 08:58

## 2022-10-07 RX ADMIN — LIDOCAINE HYDROCHLORIDE 100 MG: 20 INJECTION, SOLUTION EPIDURAL; INFILTRATION; INTRACAUDAL; PERINEURAL at 07:43

## 2022-10-07 RX ADMIN — HYDROMORPHONE HYDROCHLORIDE 0.5 MG: 2 INJECTION, SOLUTION INTRAMUSCULAR; INTRAVENOUS; SUBCUTANEOUS at 10:28

## 2022-10-07 ASSESSMENT — PAIN DESCRIPTION - DESCRIPTORS
DESCRIPTORS: ACHING

## 2022-10-07 ASSESSMENT — PAIN SCALES - GENERAL
PAINLEVEL_OUTOF10: 4
PAINLEVEL_OUTOF10: 1
PAINLEVEL_OUTOF10: 10
PAINLEVEL_OUTOF10: 10
PAINLEVEL_OUTOF10: 9
PAINLEVEL_OUTOF10: 5
PAINLEVEL_OUTOF10: 4

## 2022-10-07 ASSESSMENT — PAIN DESCRIPTION - LOCATION
LOCATION: ABDOMEN

## 2022-10-07 ASSESSMENT — PAIN - FUNCTIONAL ASSESSMENT: PAIN_FUNCTIONAL_ASSESSMENT: 0-10

## 2022-10-07 NOTE — H&P
General Surgery New Patient Office Visit:     Pt presents with concerns about a Hernia in her epigastric region. Patient states that she underwent a lap torrey and over the last couple months has been noticing more pain in this area of her subxiphoid region. Patient states that the pain is gotten somewhat better since she is started an acid reflux medication but she states that it still bulges out and causes her some discomfort. Patient denies any issues with her bowels or bladder.      [facility]      Allergies: No Known Allergies      Past History:  Past Medical History        Past Medical History:   Diagnosis Date    CAD (coronary artery disease)       follows with Cardiology    Gallstones 12/14/12    GERD (gastroesophageal reflux disease)      History of DVT (deep vein thrombosis)      History of pulmonary embolus (PE)      Hyperlipidemia      Hypertension       controlled with meds    BOSTON (obstructive sleep apnea)       Mouth piece    PVC (premature ventricular contraction)      Thromboembolus (HCC)       PE & DVT         Past Surgical History         Past Surgical History:   Procedure Laterality Date    BREAST LUMPECTOMY         benign    CARDIAC CATHETERIZATION        CHOLECYSTECTOMY, LAPAROSCOPIC        GYN         laparoscopies for endometriosis- x 4            Family and Social History:  Family History         Family History   Problem Relation Age of Onset    Other Maternal Grandfather           lymphoma    Pulmonary Embolism Paternal Grandfather      Pulmonary Embolism Other      Hypertension Mother      Diabetes Father      Other Paternal Grandfather           brain tumor         Social History               Socioeconomic History    Marital status: Life Partner       Spouse name: Not on file    Number of children: Not on file    Years of education: Not on file    Highest education level: Not on file   Occupational History    Not on file   Tobacco Use    Smoking status: Former Packs/day: 1.00       Types: Cigarettes       Quit date: 1/1/2007       Years since quitting: 15.5    Smokeless tobacco: Never   Vaping Use    Vaping Use: Never used   Substance and Sexual Activity    Alcohol use: Yes       Alcohol/week: 1.7 standard drinks    Drug use: No    Sexual activity: Not on file   Other Topics Concern    Not on file   Social History Narrative      and lives alone. Works as a .  Has 2 dogs. Social Determinants of Health      Financial Resource Strain: Not on file   Food Insecurity: Not on file   Transportation Needs: Not on file   Physical Activity: Not on file   Stress: Not on file   Social Connections: Not on file   Intimate Partner Violence: Not on file   Housing Stability: Not on file            REVIEW OF SYSTEMS: 10 Point ROS negative except what is in HPI     PHYSICAL EXAMINATION:     /78   Pulse 73   Ht 5' 4\" (1.626 m)   Wt 201 lb (91.2 kg)   SpO2 97%   BMI 34.50 kg/m²      General Appearance   AOx3, in no acute distress  Eyes    Conjunctivae/corneas clear. PERRL, EOM's intact. No scleral icterus  Ears, Nose, Throat      ENT exam normal, no neck nodes or sinus tenderness  Neck    supple, no significant adenopathy. No notable JVD  Respiratory      No chest wall deformities or tenderness, respiratory effort normal, no use of accessory muscles. Cardiovascular            RRR. No chest wall tenderness. Gastrointestinal           Abdomen soft, nontender, nondistended. BS x4. No rebound, guarding, or rigidity present. No palpable masses. No CVA tenderness epigastric incisional hernia  Lymphatics      No palpable lymphadenopathy, no hepatosplenomegaly  Musculoskeletal          No joint tenderness, deformity or swelling. Full ROM UE/LE. Distal pulses intact UE/LE. No edema, cyanosis, or venous stasis changes.   Skin     Normal coloration and turgor, no rashes, no suspicious skin lesions noted  Neurological    alert, oriented, normal speech, no focal findings or movement disorder noted, CN II-XII intact  Psychiatric       Alert and oriented, appropriate affect     Images: None     Assessment:  Epigastric incisional hernia     Plan: Will proceed with laparoscopic ventral hernia repair with mesh. Technical details of the procedure are reviewed. Risks reviewed include risks of anesthesia, bleeding, infection, visceral injury, persistent post-operative pain, and hernia recurrence. All questions are answered. Patient does have a history of DVTs and when she underwent her heart ablation she was noted to have clotting on the catheters. Patient is currently on anticoagulation because she is about to travel. She did not have any blood clots after her lap torrey. I discussed with her that I do utilize heparin preoperatively as well as SCDs and then postoperatively my recommendation would be to do a baby aspirin for a few days preventatively.

## 2022-10-07 NOTE — ANESTHESIA PRE PROCEDURE
Department of Anesthesiology  Preprocedure Note       Name:  Allen Malave   Age:  56 y.o.  :  1965                                          MRN:  [id number]         Date:  10/6/2022      Surgeon: Mina Mancilla):  Jackie Galindo DO    Procedure: Procedure(s): HERNIA EPIGASTRIC REPAIR LAPAROSCOPIC ROBOTIC    Medications prior to admission:   Prior to Admission medications    Medication Sig Start Date End Date Taking? Authorizing Provider   olopatadine (PATANASE) 0.6 % SOLN nassl soln 2 sprays by Nasal route in the morning and 2 sprays before bedtime.   Patient taking differently: 2 sprays by Nasal route 2 times daily as needed 8/10/22   Laura Boggs MD   fluticasone Tacho Hosteller) 50 MCG/ACT nasal spray 2 sprays by Each Nostril route daily  Patient taking differently: 2 sprays by Each Nostril route as needed 22   Laura Boggs MD   flecainide (TAMBOCOR) 100 MG tablet Take 1 tablet by mouth 2 times daily 22   Elia Swift MD   rosuvastatin (CRESTOR) 10 MG tablet Take 1 tablet by mouth daily  Patient taking differently: Take 10 mg by mouth at bedtime 22   Elia Swift MD   doxycycline hyclate (VIBRAMYCIN) 100 mg capsule Take 100 mg by mouth as needed    Historical Provider, MD   baclofen (LIORESAL) 10 MG tablet Take 10 mg by mouth 3 times daily as needed    Historical Provider, MD   buPROPion (WELLBUTRIN XL) 150 MG extended release tablet TAKE 1 TABLET BY MOUTH ONCE DAILY IN THE MORNING  Patient taking differently: nightly TAKE 1 TABLET BY MOUTH ONCE DAILY IN THE MORNING 22   Laura Boggs MD   montelukast (SINGULAIR) 10 MG tablet TAKE 1 TABLET BY MOUTH ONCE DAILY FOR ALLERGIES AND FOR ASTHMA  Patient taking differently: nightly TAKE 1 TABLET BY MOUTH ONCE DAILY FOR ALLERGIES AND FOR ASTHMA 22   Laura Boggs MD   losartan (COZAAR) 25 MG tablet TAKE 1 TABLET BY MOUTH ONCE DAILY  Patient taking differently: at bedtime TAKE 1 TABLET BY MOUTH ONCE DAILY 22   Laura Boggs MD   escitalopram (LEXAPRO) 10 MG tablet TAKE 1 TABLET BY MOUTH ONCE DAILY  Patient taking differently: nightly TAKE 1 TABLET BY MOUTH ONCE DAILY 6/7/22   Falguni Izaguirre MD   pantoprazole (PROTONIX) 40 MG tablet Take 1 tablet by mouth every morning (before breakfast)  Patient taking differently: Take 40 mg by mouth nightly 6/7/22   Falguni Izaguirre MD   albuterol sulfate  (90 Base) MCG/ACT inhaler INHALE 1 PUFF BY MOUTH EVERY 6 HOURS AS NEEDED FOR WHEEZING 4/9/21   Historical Provider, MD   verapamil (VERELAN) 180 MG extended release capsule nightly 5/31/22   Kristen Sofia MD       Current medications:    No current facility-administered medications for this encounter. Current Outpatient Medications   Medication Sig Dispense Refill    olopatadine (PATANASE) 0.6 % SOLN nassl soln 2 sprays by Nasal route in the morning and 2 sprays before bedtime.  (Patient taking differently: 2 sprays by Nasal route 2 times daily as needed) 1 each 5    fluticasone (FLONASE) 50 MCG/ACT nasal spray 2 sprays by Each Nostril route daily (Patient taking differently: 2 sprays by Each Nostril route as needed) 48 g 1    flecainide (TAMBOCOR) 100 MG tablet Take 1 tablet by mouth 2 times daily 180 tablet 1    rosuvastatin (CRESTOR) 10 MG tablet Take 1 tablet by mouth daily (Patient taking differently: Take 10 mg by mouth at bedtime) 90 tablet 3    doxycycline hyclate (VIBRAMYCIN) 100 mg capsule Take 100 mg by mouth as needed      baclofen (LIORESAL) 10 MG tablet Take 10 mg by mouth 3 times daily as needed      buPROPion (WELLBUTRIN XL) 150 MG extended release tablet TAKE 1 TABLET BY MOUTH ONCE DAILY IN THE MORNING (Patient taking differently: nightly TAKE 1 TABLET BY MOUTH ONCE DAILY IN THE MORNING) 90 tablet 1    montelukast (SINGULAIR) 10 MG tablet TAKE 1 TABLET BY MOUTH ONCE DAILY FOR ALLERGIES AND FOR ASTHMA (Patient taking differently: nightly TAKE 1 TABLET BY MOUTH ONCE DAILY FOR ALLERGIES AND FOR ASTHMA) 90 tablet 1    losartan (COZAAR) 25 MG tablet TAKE 1 TABLET BY MOUTH ONCE DAILY (Patient taking differently: at bedtime TAKE 1 TABLET BY MOUTH ONCE DAILY) 90 tablet 1    escitalopram (LEXAPRO) 10 MG tablet TAKE 1 TABLET BY MOUTH ONCE DAILY (Patient taking differently: nightly TAKE 1 TABLET BY MOUTH ONCE DAILY) 90 tablet 1    pantoprazole (PROTONIX) 40 MG tablet Take 1 tablet by mouth every morning (before breakfast) (Patient taking differently: Take 40 mg by mouth nightly) 90 tablet 1    albuterol sulfate  (90 Base) MCG/ACT inhaler INHALE 1 PUFF BY MOUTH EVERY 6 HOURS AS NEEDED FOR WHEEZING      verapamil (VERELAN) 180 MG extended release capsule nightly         Allergies:  No Known Allergies    Problem List:    Patient Active Problem List   Diagnosis Code    Chest pain R07.9    HTN (hypertension) I10    Acute deep vein thrombosis (DVT) of popliteal vein of right lower extremity (HCC) I82.431    Endometriosis N80.9    Acute pulmonary embolism (HCC) I26.99    Allergic rhinitis J30.9    Obesity (BMI 30-39. 9) E66.9    Hypertension I10    History of pulmonary embolus (PE) Z86.711    CAD (coronary artery disease) I25.10    Hyperlipidemia E78.5    Dyspnea R06.00    Esophagitis K20.90    Hypoxemia R09.02    PVC (premature ventricular contraction) I49.3    Malaise and fatigue R53.81, R53.83    BOSTON (obstructive sleep apnea) G47.33    Dyslipidemia E78.5    Epigastric hernia K43.9    Incisional hernia K43.2       Past Medical History:        Diagnosis Date    Adverse effect of anesthesia     states woke up several times during colonoscopy    Anxiety     managing with medication    CAD (coronary artery disease)     follows with Cardiology    COVID-19 12/2020    not hospitalized reports symptoms of cough, SOB, fever,chills, fatigue, weakness, HA, loss of taste and smell    Gallstones 12/14/12    GERD (gastroesophageal reflux disease)     History of DVT (deep vein thrombosis)     History of pulmonary embolus (PE)     Hyperlipidemia     Hypertension     controlled with meds    BOSTON (obstructive sleep apnea)     Mouth piece    PONV (postoperative nausea and vomiting)     PVC (premature ventricular contraction)     Thromboembolus (HCC)     PE & DVT       Past Surgical History:        Procedure Laterality Date    BREAST LUMPECTOMY      benign    CARDIAC CATHETERIZATION      CHOLECYSTECTOMY, LAPAROSCOPIC      COLONOSCOPY      GYN      laparoscopies for endometriosis- x 4       Social History:    Social History     Tobacco Use    Smoking status: Former     Packs/day: 1.00     Types: Cigarettes     Quit date: 1/1/2007     Years since quitting: 15.7    Smokeless tobacco: Never   Substance Use Topics    Alcohol use: Yes     Alcohol/week: 3.0 standard drinks     Types: 3 Cans of beer per week     Comment: occasional                                Counseling given: Not Answered      Vital Signs (Current):   Vitals:    10/04/22 1345   Weight: 200 lb (90.7 kg)   Height: 5' 4\" (1.626 m)                                              BP Readings from Last 3 Encounters:   07/28/22 128/78   06/14/22 128/82   06/07/22 120/76       NPO Status:                                                                                 BMI:   Wt Readings from Last 3 Encounters:   07/28/22 201 lb (91.2 kg)   06/14/22 201 lb (91.2 kg)   06/07/22 202 lb 3.2 oz (91.7 kg)     Body mass index is 34.33 kg/m².     CBC:   Lab Results   Component Value Date/Time    WBC 7.4 04/19/2022 08:23 AM    RBC 4.83 04/19/2022 08:23 AM    HGB 13.7 10/05/2022 09:30 AM    HCT 43.8 04/19/2022 08:23 AM    MCV 90.7 04/19/2022 08:23 AM    RDW 13.2 04/19/2022 08:23 AM     04/19/2022 08:23 AM       CMP:   Lab Results   Component Value Date/Time     04/23/2022 04:18 AM    K 3.4 04/23/2022 04:18 AM     04/23/2022 04:18 AM    CO2 25 04/23/2022 04:18 AM    BUN 11 04/23/2022 04:18 AM    CREATININE 0.70 04/23/2022 04:18 AM    GFRAA >60 04/23/2022 04:18 AM    GLUCOSE 90 04/23/2022 04:18 AM    CALCIUM 9.0 04/23/2022 04:18 AM       POC Tests: No results for input(s): POCGLU, POCNA, POCK, POCCL, POCBUN, POCHEMO, POCHCT in the last 72 hours. Coags: No results found for: PROTIME, INR, APTT    HCG (If Applicable): No results found for: PREGTESTUR, PREGSERUM, HCG, HCGQUANT     ABGs: No results found for: PHART, PO2ART, NKX3BAC, HLT8EBX, BEART, R0JVJTCH     Type & Screen (If Applicable):  No results found for: LABABO, LABRH    Drug/Infectious Status (If Applicable):  No results found for: HIV, HEPCAB    COVID-19 Screening (If Applicable):   Lab Results   Component Value Date/Time    COVID19 Not Detected 02/15/2022 08:08 AM    COVID19 Performed 02/15/2022 08:08 AM           Anesthesia Evaluation    Airway: Mallampati: II          Dental: normal exam         Pulmonary: breath sounds clear to auscultation  (+) shortness of breath:  sleep apnea:                             Cardiovascular:  Exercise tolerance: poor (<4 METS),   (+) hypertension:, CAD (per cards note \"mild\" per 2020 [facility]):, hyperlipidemia    (-) murmur and peripheral edema      Rhythm: regular  Rate: normal                 ROS comment: 12/2021 - TTE EF 55% with no significant valvular abnormalities. 4/2022 PVC ablation     Neuro/Psych:               GI/Hepatic/Renal:   (+) GERD:,          ROS comment: Obesity  . Endo/Other:                     Abdominal:             Vascular:   + PE. Other Findings:           Anesthesia Plan      general     ASA 3     (GETA)  Induction: intravenous. Anesthetic plan and risks discussed with patient.                         Ericka Hernandez MD   10/6/2022

## 2022-10-07 NOTE — ANESTHESIA POSTPROCEDURE EVALUATION
Department of Anesthesiology  Postprocedure Note    Patient: Valerie Floyd  MRN: [id number]  YOB: 1965  Date of evaluation: 10/7/2022      Procedure Summary     Date: 10/07/22 Room / Location: [facility] MAIN OR 09 / [facility] MAIN OR    Anesthesia Start: [address] [place] [address] Anesthesia Stop: 6224    Procedure: HERNIA EPIGASTRIC REPAIR LAPAROSCOPIC ROBOTIC (Abdomen) Diagnosis:       Epigastric hernia      (Epigastric hernia [K43.9])    Providers: Jackie Galindo DO Responsible Provider: Farooq Berry MD    Anesthesia Type: general ASA Status: 3          Anesthesia Type: No value filed.     Jose Phase I: Jose Score: 8    Jose Phase II:        Anesthesia Post Evaluation    Patient location during evaluation: PACU  Patient participation: complete - patient participated  Level of consciousness: awake and alert  Airway patency: patent  Nausea & Vomiting: no nausea and no vomiting  Complications: no  Cardiovascular status: hemodynamically stable  Respiratory status: acceptable, nonlabored ventilation and spontaneous ventilation  Hydration status: euvolemic  Comments: BP (!) 159/78   Pulse 84   Temp 98.5 °F (36.9 °C) (Temporal)   Resp 16   Ht 5' 4\" (1.626 m)   Wt 202 lb 6.4 oz (91.8 kg)   SpO2 (!) 89%   BMI 34.74 kg/m²     Multimodal analgesia pain management approach

## 2022-10-07 NOTE — OP NOTE
Robotic Ventral Herniorrhaphy with Mesh Procedure Note      Name:  [facility] Date/Time of Admission: 10/7/2022  5:22 AM  CSN: [id number]  Attending Provider: Katelynn Helm, *  MRN:  [id number]  : 1965 56 y.o. Pre-operative Diagnosis: incarcerated incisional Hernia    Post-operative Diagnosis: Same    Procedure:  Robotic incarcerated incisional Herniorrhaphy with Mesh    Surgeon: Sarahy Sommers DO    Anesthesia: General endotracheal anesthesia    INDICATION: The patient is a 56y.o.-year-old female who complains of of an abdominal wall hernia. They present today for elective laparoscopic herniorrhaphy with mesh. DESCRIPTION OF PROCEDURE: Patient was currently identified in preoperative holding area consent. Consent was confirmed and placed in the chart. Preoperative antibiotics were administered per SCIP protocol. Patient was then taken back to the operative suite placed in supine position general anesthesia was performed in the department anesthesia via an endotracheal tube next patient's abdomen was prepped and draped in usual sterile fashion. A timeout was performed and all members of the team that were present were in agreement. The procedure began by locating a spot approximately 2 fingerbreadths below the patient's left subcostal margin here this area was locally anesthetized. Next using a 15 blade scalpel and incision was made through skin down subcutaneous tissues below. A 5 mm Optiview trocar with a 5 mm 0° laparoscope was placed into this incision and the abdomen was entered under direct visualization while visualizing all layers of the abdominal wall. After entry into the abdomen, insufflation was attached. The obturator was removed and a 5 mm 30° laparoscopic camera was reinserted. Pneumoperitoneum was achieved and the contents of the abdomen were inspected and found to be free of any defects or harm.      At this point in time a thorough investigation of the abdomen was entertained. The patient was noted to have an abdominal wall hernia. Given this an additional two 8 mm trocar was then placed in the mid abdomen after previous and anesthetization and under direct visualization, I also upsized the 5mm trocar for 8mm robotic trocar. The robot was docked. I then began by taking down the peritoneum and reducing the hernia and sac and the incarcerated ligament. I then utilized 0 v-lock to close the defect. A 6in ventral light ST mesh was chosen. The mesh was then brought into direct adjacent contact to that of the anterior abdominal wall fascia and ultimately covered the defect. Next in a circumferential running 2-0 v-lock was used to suture the mesh into place. This yielded an adequate appearing hernia repair. I then closed the peritoneum with 2-0 v-lock. Following this hemostasis was assessed and noted to be adequate. Therefore pneumoperitoneum was desufflated and all trochars removed under direct visualization. 3-0 Monocryl suture was used to close the skin of all incisions in a simple interrupted fashion. Mastisol and Steri-Strips were to all incisions. At this time, the procedure was complete. At the conclusion of the case all sponge needles and instrument counts were correct. The patient tolerated well and was taken to the [address] [place] [address] Unit. They will be given outpatient follow-up in approximately 1-2 weeks.     Arabella Hahn7 Juan David Arizmendi DO

## 2022-10-07 NOTE — ADDENDUM NOTE
Addendum  created 10/07/22 1233 by Kyle Motley, APRN - CRNA    Flowsheet accepted, Flowsheet data copied forward, Intraprocedure Flowsheets edited, Intraprocedure Meds edited

## 2022-10-07 NOTE — DISCHARGE INSTRUCTIONS
DISCHARGE INSTRUCTIONS    Please call our office for a follow-up appointment in 1-2 week(s). Driving: No driving while taking pain medications    Activity: No strenous activity. Slowly advance as tolerated. No lifting greater than 20lbs. Diet:  Slowly advance as tolerated. Increase your fluids and fiber, as pain medications may cause constipation. No alcoholic beverages. Bathing: You may shower. No tub baths for 5 days. Dressing: If outer dressing, remove in 24 hours. Leave steri strips intact. Other:  Ice to incision as needed for pain. If drains present, empty and record    output daily. Call Dr. Michele Mesa if you have:  ? Temperature greater than 100.4  ? Persistent nausea and vomiting  ? Severe uncontrolled pain  ? Redness, tenderness, or signs of infection (pain, swelling, redness, odor or green/yellow discharge around the site)  ? Difficulty breathing, headache or visual disturbances  ? Hives  ? Persistent dizziness or light-headedness  ? Extreme fatigue  ? Any other questions or concerns you may have after discharge    In an emergency, call 911 or go to an Emergency Department at [facility] or [facility].    It is important to bring a complete, current list of your medications to any medical appointments or hospitalizations. I understand and acknowledge receipt of the above instructions.         _____________________________                                                                                                                                        Patient or Guardian Signature                                                         Date/Time      ______________________________                                                                                                                                      [id number] or R.N.'s Signature                                                        Date/Time      The discharge instructions have been reviewed with the patient and/or Guardian. Patient and/or Guardian signed and retained a printed copy. MEDICATION INTERACTION:  During your procedure you potentially received a medication or medications which may reduce the effectiveness of oral contraceptives. Please consider other forms of contraception for 1 month following your procedure if you are currently using oral contraceptives as your primary form of birth control. In addition to this, we recommend continuing your oral contraceptive as prescribed, unless otherwise instructed by your physician, during this time    After general anesthesia or intravenous sedation, for 24 hours or while taking prescription Narcotics:  Limit your activities  A responsible adult needs to be with you for the next 24 hours  Do not drive and operate hazardous machinery  Do not make important personal or business decisions  Do not drink alcoholic beverages  If you have not urinated within 8 hours after discharge, and you are experiencing discomfort from urinary retention, please go to the nearest ED. If you have sleep apnea and have a CPAP machine, please use it for all naps and sleeping. Please use caution when taking narcotics and any of your home medications that may cause drowsiness. *  Please give a list of your current medications to your Primary Care Provider. *  Please update this list whenever your medications are discontinued, doses are      changed, or new medications (including over-the-counter products) are added. *  Please carry medication information at all times in case of emergency situations. These are general instructions for a healthy lifestyle:  No smoking/ No tobacco products/ Avoid exposure to second hand smoke  Surgeon General's Warning:  Quitting smoking now greatly reduces serious risk to your health.   Obesity, smoking, and sedentary lifestyle greatly increases your risk for illness  A healthy diet, regular physical exercise & weight monitoring are important for maintaining a healthy lifestyle    You may be retaining fluid if you have a history of heart failure or if you experience any of the following symptoms:  Weight gain of 3 pounds or more overnight or 5 pounds in a week, increased swelling in our hands or feet or shortness of breath while lying flat in bed. Please call your doctor as soon as you notice any of these symptoms; do not wait until your next office visit.

## 2022-10-07 NOTE — ANESTHESIA PROCEDURE NOTES
Airway  Date/Time: 10/7/2022 7:45 AM  Urgency: elective    Airway not difficult    General Information and Staff    Patient location during procedure: OR  Resident/CRNA: EDGAR Nugent - CRNA  Other anesthesia staff: Joana Somers RN  Performed: other anesthesia staff and resident/CRNA     Indications and Patient Condition  Indications for airway management: anesthesia  Spontaneous Ventilation: absent  Sedation level: deep  Preoxygenated: yes  Patient position: sniffing  MILS not maintained throughout  Mask difficulty assessment: vent by bag mask + OA or adjuvant +/- NMBA    Final Airway Details  Final airway type: endotracheal airway      Successful airway: ETT  Cuffed: yes   Successful intubation technique: direct laryngoscopy  Facilitating devices/methods: intubating stylet  Endotracheal tube insertion site: oral  Blade: Lida  Blade size: #4  ETT size (mm): 7.0  Cormack-Lehane Classification: grade IIa - partial view of glottis  Placement verified by: chest auscultation and capnometry   Measured from: teeth  ETT to teeth (cm): 22  Number of attempts at approach: 2  Ventilation between attempts: bag mask  Number of other approaches attempted: 0    Additional Comments  First attempt will [place] blade by SRNA, unable to view Vcs. Second attempt with Mac by Stephanie Lieu.   no

## 2022-10-20 ENCOUNTER — OFFICE VISIT (OUTPATIENT)
Dept: SURGERY | Age: 57
End: 2022-10-20

## 2022-10-20 VITALS — OXYGEN SATURATION: 99 % | BODY MASS INDEX: 34.49 KG/M2 | WEIGHT: 202 LBS | HEIGHT: 64 IN | HEART RATE: 91 BPM

## 2022-10-20 DIAGNOSIS — K43.2 INCISIONAL HERNIA, WITHOUT OBSTRUCTION OR GANGRENE: ICD-10-CM

## 2022-10-20 DIAGNOSIS — Z09 POSTOPERATIVE EXAMINATION: Primary | ICD-10-CM

## 2022-10-20 PROCEDURE — 99024 POSTOP FOLLOW-UP VISIT: CPT

## 2022-10-20 RX ORDER — GABAPENTIN 100 MG/1
100 CAPSULE ORAL 3 TIMES DAILY
Qty: 30 CAPSULE | Refills: 0 | Status: SHIPPED | OUTPATIENT
Start: 2022-10-20 | End: 2022-10-30

## 2022-10-20 RX ORDER — METHOCARBAMOL 750 MG/1
750 TABLET, FILM COATED ORAL 4 TIMES DAILY
Qty: 40 TABLET | Refills: 0 | Status: SHIPPED | OUTPATIENT
Start: 2022-10-20 | End: 2022-10-30

## 2022-10-20 NOTE — PATIENT INSTRUCTIONS
1. Follow-up as needed    2. For 3 weeks after surgery, lift nothing heavier than 25 lbs    3. Regular diet  4. Do not get constipated. No more than 2 days without a bm. If 2 days no bm, then take colace stool softener twice a day. If 24 hours of colace does not produce a bm , then keep taking colace and add miralax laxative twice a day until you have a bm. Once you are having regular bms, you can use colace and miralax as needed.

## 2022-10-21 ENCOUNTER — RX ONLY (OUTPATIENT)
Age: 57
Setting detail: RX ONLY
End: 2022-10-21

## 2022-10-21 ENCOUNTER — APPOINTMENT (RX ONLY)
Dept: URBAN - METROPOLITAN AREA CLINIC 330 | Facility: CLINIC | Age: 57
Setting detail: DERMATOLOGY
End: 2022-10-21

## 2022-10-21 ENCOUNTER — HOSPITAL ENCOUNTER (OUTPATIENT)
Dept: MAMMOGRAPHY | Age: 57
Discharge: HOME | End: 2022-10-24
Payer: COMMERCIAL

## 2022-10-21 DIAGNOSIS — D22 MELANOCYTIC NEVI: ICD-10-CM

## 2022-10-21 DIAGNOSIS — Z12.31 VISIT FOR SCREENING MAMMOGRAM: ICD-10-CM

## 2022-10-21 DIAGNOSIS — L70.0 ACNE VULGARIS: ICD-10-CM | Status: INADEQUATELY CONTROLLED

## 2022-10-21 DIAGNOSIS — L81.4 OTHER MELANIN HYPERPIGMENTATION: ICD-10-CM

## 2022-10-21 DIAGNOSIS — D18.0 HEMANGIOMA: ICD-10-CM

## 2022-10-21 DIAGNOSIS — L82.1 OTHER SEBORRHEIC KERATOSIS: ICD-10-CM

## 2022-10-21 DIAGNOSIS — Z80.8 FAMILY HISTORY OF MALIGNANT NEOPLASM OF OTHER ORGANS OR SYSTEMS: ICD-10-CM

## 2022-10-21 PROBLEM — D22.5 MELANOCYTIC NEVI OF TRUNK: Status: ACTIVE | Noted: 2022-10-21

## 2022-10-21 PROBLEM — D18.01 HEMANGIOMA OF SKIN AND SUBCUTANEOUS TISSUE: Status: ACTIVE | Noted: 2022-10-21

## 2022-10-21 PROCEDURE — ? FULL BODY SKIN EXAM

## 2022-10-21 PROCEDURE — ? PRESCRIPTION MEDICATION MANAGEMENT

## 2022-10-21 PROCEDURE — ? COUNSELING

## 2022-10-21 PROCEDURE — 99214 OFFICE O/P EST MOD 30 MIN: CPT

## 2022-10-21 PROCEDURE — ? OTHER

## 2022-10-21 PROCEDURE — ? SUNSCREEN RECOMMENDATIONS

## 2022-10-21 PROCEDURE — 77063 BREAST TOMOSYNTHESIS BI: CPT

## 2022-10-21 PROCEDURE — ? PRESCRIPTION

## 2022-10-21 PROCEDURE — ? ADDITIONAL NOTES

## 2022-10-21 RX ORDER — TRIFAROTENE 50 UG/G
CREAM TOPICAL
Qty: 45 | Refills: 5 | Status: ERX

## 2022-10-21 RX ORDER — CLASCOTERONE 1 G/100G
CREAM TOPICAL
Qty: 60 | Refills: 5 | Status: ERX | COMMUNITY
Start: 2022-10-21

## 2022-10-21 RX ADMIN — CLASCOTERONE: 1 CREAM TOPICAL at 00:00

## 2022-10-21 ASSESSMENT — LOCATION SIMPLE DESCRIPTION DERM
LOCATION SIMPLE: ABDOMEN
LOCATION SIMPLE: LEFT CHEEK
LOCATION SIMPLE: RIGHT FOREARM
LOCATION SIMPLE: RIGHT UPPER BACK
LOCATION SIMPLE: LEFT LOWER BACK

## 2022-10-21 ASSESSMENT — LOCATION ZONE DERM
LOCATION ZONE: FACE
LOCATION ZONE: TRUNK
LOCATION ZONE: ARM

## 2022-10-21 ASSESSMENT — LOCATION DETAILED DESCRIPTION DERM
LOCATION DETAILED: LEFT CENTRAL BUCCAL CHEEK
LOCATION DETAILED: LEFT SUPERIOR MEDIAL MIDBACK
LOCATION DETAILED: LEFT RIB CAGE
LOCATION DETAILED: RIGHT PROXIMAL DORSAL FOREARM
LOCATION DETAILED: RIGHT SUPERIOR UPPER BACK

## 2022-10-21 ASSESSMENT — SEVERITY ASSESSMENT OVERALL AMONG ALL PATIENTS
IN YOUR EXPERIENCE, AMONG ALL PATIENTS YOU HAVE SEEN WITH THIS CONDITION, HOW SEVERE IS THIS PATIENT'S CONDITION?: FEW INFLAMMATORY LESIONS, SOME NONINFLAMMATORY

## 2022-10-21 NOTE — PROCEDURE: OTHER
Render Risk Assessment In Note?: no
Note Text (......Xxx Chief Complaint.): This diagnosis correlates with the
Other (Free Text): Pt states her breakouts are primarily around her jawline. Pt is on blood pressure medications and currently sees a cardiologist. Discussed spironlactone but id prefer topical first given comorbidities. Recommended new topical cream for hormonal breakouts
Detail Level: Zone

## 2022-10-21 NOTE — PROCEDURE: PRESCRIPTION MEDICATION MANAGEMENT
Discontinue Regimen: Amzeeq (pt doesn’t like)
Continue Regimen: Doxycycline 100mg take once daily for 1-2 weeks for flares \\n\\nAklief apply to face once daily
Detail Level: Zone
Render In Strict Bullet Format?: No
Initiate Treatment: Winlevi bid to face

## 2022-11-16 DIAGNOSIS — K44.9 HIATAL HERNIA: ICD-10-CM

## 2022-11-16 DIAGNOSIS — K20.90 ESOPHAGITIS: ICD-10-CM

## 2022-11-16 RX ORDER — PANTOPRAZOLE SODIUM 40 MG/1
TABLET, DELAYED RELEASE ORAL
Qty: 90 TABLET | Refills: 0 | Status: SHIPPED | OUTPATIENT
Start: 2022-11-16

## 2022-11-25 NOTE — PROCEDURE: MIPS QUALITY
Patient is a type 1 diabetic, will close encounter as she can't afford medication out of pocket, thank you. Patients insurance will only cover this medication if patient is using for diagnosis listed below. If patient is not using medication for a related diagnosis, PA will not be approved. Please see note below from insurance and advise, thank you.      Type 2 Diabetes Mellitus (E11.9)
Patients insurance will only cover this medication if patient is using for diagnosis listed below. If patient is not using medication for a related diagnosis, PA will not be approved. Please see note below from insurance and advise, thank you.      Type 2 Diabetes Mellitus (E11.9)
Pharmacy faxed in a request for prior authorization on:    Medication: Semaglutide (Ozempic)  Dosage: 0.5MG/1. 34ML  Quantity requested:  4.5ML  Pharmacy for prescription has been selected. Prior authorization request has been initiated and sent for completion.
Detail Level: Zone
Quality 110: Preventive Care And Screening: Influenza Immunization: Influenza Immunization previously received during influenza season

## 2022-12-05 RX ORDER — VERAPAMIL HYDROCHLORIDE 180 MG/1
CAPSULE, EXTENDED RELEASE ORAL
Qty: 90 CAPSULE | Refills: 3 | Status: CANCELLED | OUTPATIENT
Start: 2022-12-05

## 2022-12-07 ENCOUNTER — OFFICE VISIT (OUTPATIENT)
Dept: FAMILY MEDICINE CLINIC | Facility: CLINIC | Age: 57
End: 2022-12-07
Payer: COMMERCIAL

## 2022-12-07 VITALS
SYSTOLIC BLOOD PRESSURE: 120 MMHG | HEIGHT: 64 IN | WEIGHT: 204 LBS | TEMPERATURE: 96.9 F | RESPIRATION RATE: 16 BRPM | BODY MASS INDEX: 34.83 KG/M2 | DIASTOLIC BLOOD PRESSURE: 72 MMHG | OXYGEN SATURATION: 97 % | HEART RATE: 74 BPM

## 2022-12-07 DIAGNOSIS — Z23 ENCOUNTER FOR IMMUNIZATION: ICD-10-CM

## 2022-12-07 DIAGNOSIS — F41.1 GAD (GENERALIZED ANXIETY DISORDER): ICD-10-CM

## 2022-12-07 DIAGNOSIS — F39 UNSPECIFIED MOOD (AFFECTIVE) DISORDER (HCC): ICD-10-CM

## 2022-12-07 DIAGNOSIS — K44.9 HIATAL HERNIA: ICD-10-CM

## 2022-12-07 DIAGNOSIS — K20.90 ESOPHAGITIS: ICD-10-CM

## 2022-12-07 DIAGNOSIS — R42 DIZZINESS: ICD-10-CM

## 2022-12-07 DIAGNOSIS — E66.01 SEVERE OBESITY (BMI 35.0-39.9) WITH COMORBIDITY (HCC): ICD-10-CM

## 2022-12-07 DIAGNOSIS — Z79.899 ENCOUNTER FOR LONG-TERM (CURRENT) USE OF MEDICATIONS: ICD-10-CM

## 2022-12-07 DIAGNOSIS — I10 PRIMARY HYPERTENSION: Primary | ICD-10-CM

## 2022-12-07 DIAGNOSIS — Z12.11 SCREEN FOR COLON CANCER: ICD-10-CM

## 2022-12-07 DIAGNOSIS — J30.1 NON-SEASONAL ALLERGIC RHINITIS DUE TO POLLEN: ICD-10-CM

## 2022-12-07 DIAGNOSIS — Z11.59 NEED FOR HEPATITIS C SCREENING TEST: ICD-10-CM

## 2022-12-07 PROCEDURE — 3074F SYST BP LT 130 MM HG: CPT | Performed by: FAMILY MEDICINE

## 2022-12-07 PROCEDURE — 90471 IMMUNIZATION ADMIN: CPT | Performed by: FAMILY MEDICINE

## 2022-12-07 PROCEDURE — 90674 CCIIV4 VAC NO PRSV 0.5 ML IM: CPT | Performed by: FAMILY MEDICINE

## 2022-12-07 PROCEDURE — 3078F DIAST BP <80 MM HG: CPT | Performed by: FAMILY MEDICINE

## 2022-12-07 PROCEDURE — 99214 OFFICE O/P EST MOD 30 MIN: CPT | Performed by: FAMILY MEDICINE

## 2022-12-07 RX ORDER — ESCITALOPRAM OXALATE 10 MG/1
TABLET ORAL
Qty: 90 TABLET | Refills: 1 | Status: CANCELLED | OUTPATIENT
Start: 2022-12-07

## 2022-12-07 RX ORDER — MECLIZINE HCL 12.5 MG/1
12.5 TABLET ORAL 3 TIMES DAILY PRN
Qty: 30 TABLET | Refills: 3 | Status: SHIPPED | OUTPATIENT
Start: 2022-12-07 | End: 2022-12-17

## 2022-12-07 RX ORDER — TRIFAROTENE 50 UG/G
CREAM TOPICAL
COMMUNITY
Start: 2022-10-21

## 2022-12-07 RX ORDER — CLASCOTERONE 1 G/100G
CREAM TOPICAL
COMMUNITY
Start: 2022-10-21

## 2022-12-07 RX ORDER — ESCITALOPRAM OXALATE 5 MG/1
5 TABLET ORAL DAILY
Qty: 90 TABLET | Refills: 1 | Status: SHIPPED | OUTPATIENT
Start: 2022-12-07

## 2022-12-07 RX ORDER — PANTOPRAZOLE SODIUM 40 MG/1
TABLET, DELAYED RELEASE ORAL
Qty: 90 TABLET | Refills: 1 | Status: SHIPPED | OUTPATIENT
Start: 2022-12-07

## 2022-12-07 RX ORDER — LOSARTAN POTASSIUM 25 MG/1
TABLET ORAL
Qty: 90 TABLET | Refills: 1 | Status: SHIPPED | OUTPATIENT
Start: 2022-12-07

## 2022-12-07 RX ORDER — MONTELUKAST SODIUM 10 MG/1
TABLET ORAL
Qty: 90 TABLET | Refills: 1 | Status: SHIPPED | OUTPATIENT
Start: 2022-12-07

## 2022-12-07 RX ORDER — BUPROPION HYDROCHLORIDE 150 MG/1
TABLET ORAL
Qty: 90 TABLET | Refills: 1 | Status: SHIPPED | OUTPATIENT
Start: 2022-12-07

## 2022-12-07 ASSESSMENT — PATIENT HEALTH QUESTIONNAIRE - PHQ9
1. LITTLE INTEREST OR PLEASURE IN DOING THINGS: 0
SUM OF ALL RESPONSES TO PHQ9 QUESTIONS 1 & 2: 0
SUM OF ALL RESPONSES TO PHQ QUESTIONS 1-9: 0
2. FEELING DOWN, DEPRESSED OR HOPELESS: 0
SUM OF ALL RESPONSES TO PHQ QUESTIONS 1-9: 0

## 2022-12-07 NOTE — PROGRESS NOTES
HISTORY OF PRESENT ILLNESS  Chief Complaint   Patient presents with    Medication Refill    Medication Check     PT wants to discuss narrowing down med list        Wonders if coming off of the lexapro might help her sex drive. Has an occasionally vertigo and we had given her something for that in the past.    Subjective:   Emelyn Balderrama is a 56 y.o. female with hypertension. Hypertension ROS: taking medications as instructed, no medication side effects noted, no TIA's, no chest pain on exertion, no dyspnea on exertion, no swelling of ankles. Key CAD CHF Meds            losartan (COZAAR) 25 MG tablet (Taking)    Sig: TAKE 1 TABLET BY MOUTH ONCE DAILY    rosuvastatin (CRESTOR) 10 MG tablet (Taking)    Sig - Route:  Take 1 tablet by mouth daily - Oral    Patient taking differently: Take 10 mg by mouth at bedtime    verapamil (VERELAN) 180 MG extended release capsule (Taking)    Class: Historical Med           Lab Results   Component Value Date/Time     04/23/2022 04:18 AM    K 3.4 04/23/2022 04:18 AM     04/23/2022 04:18 AM    CO2 25 04/23/2022 04:18 AM    BUN 11 04/23/2022 04:18 AM    CREATININE 0.70 04/23/2022 04:18 AM    GLUCOSE 90 04/23/2022 04:18 AM    CALCIUM 9.0 04/23/2022 04:18 AM       Lab Results   Component Value Date    CREATININE 0.70 04/23/2022      Lab Results   Component Value Date    CHOL 145 12/01/2021    CHOL 150 06/15/2020     Lab Results   Component Value Date    TRIG 90 12/01/2021    TRIG 75 06/15/2020     Lab Results   Component Value Date    HDL 60 12/01/2021    HDL 50 06/15/2020     Lab Results   Component Value Date    LDLCALC 68 12/01/2021    LDLCALC 85 06/15/2020     Lab Results   Component Value Date    LABVLDL 15 06/15/2020    VLDL 17 12/01/2021     Lab Results   Component Value Date    CHOLHDLRATIO 3.0 06/15/2020      No results found for: LABMICR, [id number]   BP Readings from Last 3 Encounters:   12/07/22 120/72   10/07/22 (!) 109/55   07/28/22 128/78 Worse with stress, salt. Improved with exercise, medication. She is  monitoring blood pressures. Wt Readings from Last 3 Encounters:   12/07/22 204 lb (92.5 kg)   10/20/22 202 lb (91.6 kg)   10/07/22 202 lb 6.4 oz (91.8 kg)        Hypertension  This is a chronic problem. The current episode started more than 1 month ago. The problem has been resolved since onset. The problem is controlled. Pertinent negatives include no anxiety, blurred vision, chest pain, headaches, malaise/fatigue, neck pain, orthopnea, palpitations, peripheral edema, PND, shortness of breath or sweats. There are no associated agents to hypertension. There are no known risk factors for coronary artery disease. Past treatments include angiotensin blockers and calcium channel blockers. The current treatment provides significant improvement. There is no history of angina, kidney disease, CAD/MI, CVA, heart failure, left ventricular hypertrophy, PVD or retinopathy. Review of Systems   Constitutional:  Negative for activity change, appetite change, chills, fatigue, fever and malaise/fatigue. HENT:  Negative for congestion and rhinorrhea. Eyes:  Negative for blurred vision. Respiratory:  Negative for cough, shortness of breath and wheezing. Cardiovascular:  Negative for chest pain, palpitations, orthopnea and PND. Gastrointestinal:  Negative for abdominal pain, constipation, diarrhea, nausea and vomiting. Genitourinary:  Negative for dysuria. Musculoskeletal:  Negative for arthralgias, myalgias and neck pain. Neurological:  Negative for dizziness and headaches. Psychiatric/Behavioral:  Negative for dysphoric mood. The patient is not nervous/anxious. Patient Active Problem List   Diagnosis    Chest pain    HTN (hypertension)    Acute deep vein thrombosis (DVT) of popliteal vein of right lower extremity (HCC)    Endometriosis    Acute pulmonary embolism (HCC)    Allergic rhinitis    Obesity (BMI 30-39. 9) Hypertension    History of pulmonary embolus (PE)    CAD (coronary artery disease)    Hyperlipidemia    Dyspnea    Esophagitis    Hypoxemia    PVC (premature ventricular contraction)    Malaise and fatigue    BOSTON (obstructive sleep apnea)    Dyslipidemia    Epigastric hernia    Unspecified mood (affective) disorder (HCC)         Blood pressure 120/72, pulse 74, temperature 96.9 °F (36.1 °C), temperature source Temporal, resp. rate 16, height 5' 4\" (1.626 m), weight 204 lb (92.5 kg), SpO2 97 %, not currently breastfeeding. Pain Scale: 0 - No pain/10 Pain Location:   Body mass index is 35.02 kg/m². Physical Exam  Vitals and nursing note reviewed. Constitutional:       General: She is not in acute distress. Appearance: Normal appearance. She is normal weight. She is not toxic-appearing. HENT:      Head: Normocephalic and atraumatic. Eyes:      General: Lids are normal.      Extraocular Movements: Extraocular movements intact. Conjunctiva/sclera: Conjunctivae normal.      Pupils: Pupils are equal.   Cardiovascular:      Rate and Rhythm: Normal rate and regular rhythm. Heart sounds: Normal heart sounds. No murmur heard. No friction rub. No gallop. Pulmonary:      Effort: Pulmonary effort is normal. No respiratory distress. Breath sounds: Normal breath sounds. No wheezing or rales. Musculoskeletal:      Right lower leg: No edema. Left lower leg: No edema. Skin:     General: Skin is warm and dry. Neurological:      General: No focal deficit present. Mental Status: She is alert. Psychiatric:         Mood and Affect: Mood normal.         Behavior: Behavior normal.         Thought Content: Thought content normal.         Judgment: Judgment normal.            ASSESSMENT and PLAN   Diagnosis Orders   1. Primary hypertension  losartan (COZAAR) 25 MG tablet    Comprehensive Metabolic Panel    Lipid Panel    Microalbumin / Creatinine Urine Ratio      2.  JOSH (generalized anxiety disorder)  buPROPion (WELLBUTRIN XL) 150 MG extended release tablet    escitalopram (LEXAPRO) 5 MG tablet      3. Non-seasonal allergic rhinitis due to pollen  montelukast (SINGULAIR) 10 MG tablet      4. Esophagitis  pantoprazole (PROTONIX) 40 MG tablet      5. Hiatal hernia  pantoprazole (PROTONIX) 40 MG tablet      6. Encounter for immunization  Influenza, FLUCELVAX, (age 10 mo+), IM, PF, 0.5 mL      7. Screen for colon cancer  [facility]      8. Unspecified mood (affective) disorder (HCC)        9. Severe obesity (BMI 35.0-39. 9) with comorbidity ([facility] [address]        10. Encounter for long-term (current) use of medications  Magnesium      11. Dizziness  meclizine (ANTIVERT) 12.5 MG tablet      12. Need for hepatitis C screening test  Hepatitis C Ab, Rflx to Qt by PCR          Reviewed medications and side effects in detail    Has had covid boosters. Will bring in shot record. Try cutting lexapro in 1/2 for awhile. Her other chronic conditions are stable at this time. She is stable on the current treatment and tolerating it well. No significant sides effects. Will not adjust therapy at this time, unless noted above. We will continue to monitor for any problems. Medications refilled and lab work has been ordered where needed. Reviewed medications are explained including any potential interactions or side effects in detail. The patient's questions were answered. She  understands the above and has no further questions. Further workup and treatment should be done if symptoms persist, worsen or new symptoms occur. She  will call to notify us of any problems, complications or worsening symptoms. Follow-up and Dispositions    Return in about 6 months (around 6/7/2023). There are no Patient Instructions on file for this visit. (Some details in this note may have been created with speech-recognition software. Some errors in speech recognition may have occurred. Most of those have been corrected but some may have been missed.)         Madyson Devine MD  [place] [id number] [address] Luige George [address]  Phone [phone]

## 2022-12-13 ASSESSMENT — ENCOUNTER SYMPTOMS
SHORTNESS OF BREATH: 0
COUGH: 0
ORTHOPNEA: 0
ABDOMINAL PAIN: 0
CONSTIPATION: 0
RHINORRHEA: 0
VOMITING: 0
WHEEZING: 0
NAUSEA: 0
BLURRED VISION: 0
DIARRHEA: 0

## 2022-12-14 ENCOUNTER — OFFICE VISIT (OUTPATIENT)
Dept: CARDIOLOGY CLINIC | Age: 57
End: 2022-12-14
Payer: COMMERCIAL

## 2022-12-14 VITALS
BODY MASS INDEX: 34.31 KG/M2 | SYSTOLIC BLOOD PRESSURE: 122 MMHG | HEIGHT: 64 IN | HEART RATE: 71 BPM | DIASTOLIC BLOOD PRESSURE: 72 MMHG | WEIGHT: 201 LBS

## 2022-12-14 DIAGNOSIS — I10 PRIMARY HYPERTENSION: ICD-10-CM

## 2022-12-14 DIAGNOSIS — I49.3 PVC (PREMATURE VENTRICULAR CONTRACTION): Primary | ICD-10-CM

## 2022-12-14 PROCEDURE — 93000 ELECTROCARDIOGRAM COMPLETE: CPT | Performed by: INTERNAL MEDICINE

## 2022-12-14 PROCEDURE — 99214 OFFICE O/P EST MOD 30 MIN: CPT | Performed by: INTERNAL MEDICINE

## 2022-12-14 PROCEDURE — 3074F SYST BP LT 130 MM HG: CPT | Performed by: INTERNAL MEDICINE

## 2022-12-14 PROCEDURE — 3078F DIAST BP <80 MM HG: CPT | Performed by: INTERNAL MEDICINE

## 2022-12-14 RX ORDER — VERAPAMIL HYDROCHLORIDE 180 MG/1
180 CAPSULE, EXTENDED RELEASE ORAL NIGHTLY
Qty: 90 CAPSULE | Refills: 3 | Status: SHIPPED | OUTPATIENT
Start: 2022-12-14

## 2022-12-14 RX ORDER — FLECAINIDE ACETATE 100 MG/1
100 TABLET ORAL 2 TIMES DAILY
Qty: 180 TABLET | Refills: 1 | Status: SHIPPED | OUTPATIENT
Start: 2022-12-14

## 2022-12-14 NOTE — PROGRESS NOTES
[address] [place], [place]  [address] [place] [address], [address]  Alana Machado, [address] [place] [address]  PHONE: [phone]  1965    SUBJECTIVE:   Valerie Floyd is a 56 y.o. female seen for a follow up visit regarding the following:     Chief Complaint   Patient presents with    Coronary Artery Disease       HPI:    Valerie Floyd is a very pleasant 49 y.o.  female with a past medical and cardiac history significant for minimal CAD, PVCs, HTN, HLD, s/p right sided PVC ablation with plans for left sided access, however, developed a thrombus on her CS catheter and ablation was aborted. She presented back for left sided PVC ablation. She continues to feel palpitations, but feels some improvement on flecainide. She is overall doing well, no new cardiac complaints. Cardiac PMH: (Old records have been reviewed and summarized below)   TTE (12/17/21): EF 55%   Monitor (12/21): EF 55% PVCs   Cath (6/20): mild to moderate CAD      Reviewed office note Dr. Warner Falcon 12/13/22    Past Medical History, Past Surgical History, Family history, Social History, and Medications were all reviewed with the patient today and updated as necessary.      Current Outpatient Medications   Medication Sig Dispense Refill    flecainide (TAMBOCOR) 100 MG tablet Take 1 tablet by mouth 2 times daily 180 tablet 1    verapamil (VERELAN) 180 MG extended release capsule Take 1 capsule by mouth nightly 90 capsule 3    losartan (COZAAR) 25 MG tablet TAKE 1 TABLET BY MOUTH ONCE DAILY 90 tablet 1    montelukast (SINGULAIR) 10 MG tablet TAKE 1 TABLET BY MOUTH ONCE DAILY FOR ALLERGIES AND FOR ASTHMA 90 tablet 1    buPROPion (WELLBUTRIN XL) 150 MG extended release tablet TAKE 1 TABLET BY MOUTH ONCE DAILY IN THE MORNING 90 tablet 1    pantoprazole (PROTONIX) 40 MG tablet TAKE 1 TABLET BY MOUTH ONCE DAILY IN THE MORNING BEFORE BREAKFAST 90 tablet 1    escitalopram (LEXAPRO) 5 MG tablet Take 1 tablet by mouth daily 90 tablet 1    meclizine (ANTIVERT) 12.5 MG tablet Take 1 tablet by mouth 3 times daily as needed for Nausea or Dizziness 30 tablet 3    WINLEVI 1 % CREA Apply to face twice daily      AKLIEF 0.005 % CREA Apply to face nightly      olopatadine (PATANASE) 0.6 % SOLN nassl soln 2 sprays by Nasal route in the morning and 2 sprays before bedtime. (Patient taking differently: 2 sprays by Nasal route 2 times daily as needed) 1 each 5    fluticasone (FLONASE) 50 MCG/ACT nasal spray 2 sprays by Each Nostril route daily (Patient taking differently: 2 sprays by Each Nostril route as needed) 48 g 1    rosuvastatin (CRESTOR) 10 MG tablet Take 1 tablet by mouth daily (Patient taking differently: Take 10 mg by mouth at bedtime) 90 tablet 3    baclofen (LIORESAL) 10 MG tablet Take 10 mg by mouth 3 times daily as needed      albuterol sulfate  (90 Base) MCG/ACT inhaler INHALE 1 PUFF BY MOUTH EVERY 6 HOURS AS NEEDED FOR WHEEZING      gabapentin (NEURONTIN) 100 MG capsule Take 1 capsule by mouth 3 times daily for 10 days. 30 capsule 0     No current facility-administered medications for this visit. No Known Allergies  [unfilled]  Past Medical History:   Diagnosis Date    Adverse effect of anesthesia     states woke up several times during colonoscopy    Anxiety     managing with medication    CAD (coronary artery disease)     follows with Cardiology    COVID-19 12/2020    not hospitalized reports symptoms of cough, SOB, fever,chills, fatigue, weakness, HA, loss of taste and smell    Depression     Gallstones 12/14/2012    GERD (gastroesophageal reflux disease)     History of DVT (deep vein thrombosis)     History of pulmonary embolus (PE)     Hyperlipidemia     Hypertension     controlled with meds    Incisional hernia 07/28/2022    Robotic incarcerated incisional Herniorrhaphy with Mesh.  Daun Counts, DO 10/7/22    BOSTON (obstructive sleep apnea)     Mouth piece    PONV (postoperative nausea and vomiting)     PVC (premature ventricular contraction) Thromboembolus ([facility] [address]     PE & DVT     Past Surgical History:   Procedure Laterality Date    BREAST LUMPECTOMY Left 1994    benign    CARDIAC CATHETERIZATION      CHOLECYSTECTOMY, LAPAROSCOPIC      COLONOSCOPY      GYN      laparoscopies for endometriosis- x 4    HERNIA REPAIR N/A 10/07/2022    HERNIA EPIGASTRIC REPAIR LAPAROSCOPIC ROBOTIC performed by Jennifer Mccurdy DO at [facility] MAIN OR     Family History   Problem Relation Age of Onset    Other Maternal Grandfather         lymphoma    Pulmonary Embolism Paternal Grandfather     Other Paternal Grandfather         brain tumor    Pulmonary Embolism Other     Hypertension Mother     Diabetes Father      Social History     Tobacco Use    Smoking status: Former     Packs/day: 1.00     Types: Cigarettes     Quit date: 1/1/2007     Years since quitting: 15.9    Smokeless tobacco: Never   Substance Use Topics    Alcohol use: Yes     Alcohol/week: 4.0 standard drinks     Types: 1 Glasses of wine, 3 Cans of beer per week     Comment: occasional       PHYSICAL EXAM:    /72   Pulse 71   Ht 5' 4\" (1.626 m)   Wt 201 lb (91.2 kg)   BMI 34.50 kg/m²      Wt Readings from Last 5 Encounters:   12/14/22 201 lb (91.2 kg)   12/07/22 204 lb (92.5 kg)   10/20/22 202 lb (91.6 kg)   10/07/22 202 lb 6.4 oz (91.8 kg)   07/28/22 201 lb (91.2 kg)       BP Readings from Last 5 Encounters:   12/14/22 122/72   12/07/22 120/72   10/07/22 (!) 109/55   07/28/22 128/78   06/14/22 128/82       General appearance: Alert, well appearing, and in no distress   CV: RRR, no M/R/G, no JVD, normal distal pulses, no lower extremity edema  Pulm: Clear to auscultation, no wheezes, no crackles, no accessory muscle use  GI: Soft, nontender, nondistended  Neuro: Alert and oriented    Medical problems and test results were reviewed with the patient today.      Lab Results   Component Value Date/Time    K 3.4 04/23/2022 04:18 AM     Creatinine   Date Value Ref Range Status   04/23/2022 0.70 0.6 - 1.0 MG/DL Final     Lab Results   Component Value Date/Time    HGB 13.7 10/05/2022 09:30 AM     No results found for: INR, PTMR, PT1    EKG: (EKG has been independently visualized by me with interpretation below)  Sinus  Rhythm, rate 71  -Poor R-wave progression -nonspecific -consider old anterior infarct. Everett Figueroa was seen today for coronary artery disease. Diagnoses and all orders for this visit:    CAD (coronary artery disease)  -     EKG 12 Lead    PVC (premature ventricular contraction)    Primary hypertension    Other orders  -     flecainide (TAMBOCOR) 100 MG tablet; Take 1 tablet by mouth 2 times daily  -     verapamil (VERELAN) 180 MG extended release capsule; Take 1 capsule by mouth nightly      ASSESSMENT and PLAN  1. PVCs: some improvement s/p ablation, and doing well on flecainide 100mg, cont med    2. HTN: controlled, cont verapamil 180 mg, losartan 25mg    3. Flecainide: EKG stable, cont med    Patient has been instructed and agrees to call our office with any issues or other concerns related to their cardiac condition(s) and/or complaint(s). No follow-up provider specified. Elier Mendoza MD, MS  Cardiology/Electrophysiology  12/14/2022  8:20 AM

## 2023-03-21 NOTE — TELEPHONE ENCOUNTER
Notified pt that tablet was sent to pharmacy so that pharmacy can see if insurance will cover. Understanding voiced.

## 2023-03-21 NOTE — TELEPHONE ENCOUNTER
Requested Prescriptions     Signed Prescriptions Disp Refills    verapamil (CALAN SR) 180 MG extended release tablet 90 tablet 1     Sig: Take 1 tablet by mouth daily     Authorizing Provider: Jose Raul Soto

## 2023-03-21 NOTE — TELEPHONE ENCOUNTER
Pt states that her insurance will not cover Verapamil 180 mg as a capsule, but her insurance will cover it as a tablet. Wants to know if the tablet form can be called in to [facility] at [facility] [place][note 2]. Their contact number is [phone]. Please call pt with any questions. Thank you.

## 2023-05-17 ENCOUNTER — OFFICE VISIT (OUTPATIENT)
Age: 58
End: 2023-05-17
Payer: COMMERCIAL

## 2023-05-17 VITALS
HEART RATE: 77 BPM | BODY MASS INDEX: 35 KG/M2 | WEIGHT: 205 LBS | DIASTOLIC BLOOD PRESSURE: 86 MMHG | HEIGHT: 64 IN | SYSTOLIC BLOOD PRESSURE: 116 MMHG

## 2023-05-17 DIAGNOSIS — I10 PRIMARY HYPERTENSION: ICD-10-CM

## 2023-05-17 DIAGNOSIS — I49.3 PVC (PREMATURE VENTRICULAR CONTRACTION): Primary | ICD-10-CM

## 2023-05-17 PROCEDURE — 93000 ELECTROCARDIOGRAM COMPLETE: CPT | Performed by: INTERNAL MEDICINE

## 2023-05-17 PROCEDURE — 99214 OFFICE O/P EST MOD 30 MIN: CPT | Performed by: INTERNAL MEDICINE

## 2023-05-17 PROCEDURE — 3074F SYST BP LT 130 MM HG: CPT | Performed by: INTERNAL MEDICINE

## 2023-05-17 PROCEDURE — 3079F DIAST BP 80-89 MM HG: CPT | Performed by: INTERNAL MEDICINE

## 2023-05-17 RX ORDER — BROMFENAC SODIUM 0.7 MG/ML
SOLUTION/ DROPS OPHTHALMIC
COMMUNITY
Start: 2023-04-27

## 2023-05-17 RX ORDER — PREDNISOLONE ACETATE 10 MG/ML
SUSPENSION/ DROPS OPHTHALMIC
COMMUNITY
Start: 2023-04-27

## 2023-05-17 NOTE — PROGRESS NOTES
[address] [place], [place]  [address], [address], [address]  [address]  PHONE: [phone]  1965    SUBJECTIVE:   Festus Acevedo is a 56 y.o. female seen for a follow up visit regarding the following:     Chief Complaint   Patient presents with    Irregular Heart Beat    6 Month Follow-Up       HPI:    Festus Acevedo is a very pleasant 49 y.o.  female with a past medical and cardiac history significant for minimal CAD, PVCs, HTN, HLD, s/p right sided PVC ablation with plans for left sided access, however, developed a thrombus on her CS catheter and ablation was aborted. She presented back for left sided PVC ablation. She continues to feel palpitations, had some improvement on flecainide but feels symptoms are worsening. Main complaints of LANDRY, fatigue. Cardiac PMH: (Old records have been reviewed and summarized below)   TTE (12/17/21): EF 55%   Monitor (12/21): EF 55% PVCs   Cath (6/20): mild to moderate CAD      Reviewed office note Dr. Monie Barclay 12/13/22    Past Medical History, Past Surgical History, Family history, Social History, and Medications were all reviewed with the patient today and updated as necessary. Current Outpatient Medications   Medication Sig Dispense Refill    PROLENSA 0.07 % SOLN INSTILL 1 DROP INTO OPERATIVE EYE ONCE DAILY STARTING 3 DAYS PRIOR TO SURGERY THEN CONTINUING UNTIL GONE AFTER SURGERY.       prednisoLONE acetate (PRED FORTE) 1 % ophthalmic suspension INSTILL 1 DROP IN OPERATIVE EYE THREE TIMES DAILY (START AFTER SURGERY)      verapamil (CALAN SR) 180 MG extended release tablet Take 1 tablet by mouth daily 90 tablet 1    flecainide (TAMBOCOR) 100 MG tablet Take 1 tablet by mouth 2 times daily 180 tablet 1    losartan (COZAAR) 25 MG tablet TAKE 1 TABLET BY MOUTH ONCE DAILY 90 tablet 1    montelukast (SINGULAIR) 10 MG tablet TAKE 1 TABLET BY MOUTH ONCE DAILY FOR ALLERGIES AND FOR ASTHMA 90 tablet 1    buPROPion (WELLBUTRIN XL) 150 MG extended release

## 2023-05-19 ENCOUNTER — APPOINTMENT (RX ONLY)
Dept: URBAN - METROPOLITAN AREA CLINIC 330 | Facility: CLINIC | Age: 58
Setting detail: DERMATOLOGY
End: 2023-05-19

## 2023-05-19 DIAGNOSIS — L71.0 PERIORAL DERMATITIS: ICD-10-CM | Status: INADEQUATELY CONTROLLED

## 2023-05-19 DIAGNOSIS — L70.0 ACNE VULGARIS: ICD-10-CM | Status: WELL CONTROLLED

## 2023-05-19 PROCEDURE — 99213 OFFICE O/P EST LOW 20 MIN: CPT

## 2023-05-19 PROCEDURE — ? PRESCRIPTION MEDICATION MANAGEMENT

## 2023-05-19 PROCEDURE — ? OTHER

## 2023-05-19 PROCEDURE — ? COUNSELING

## 2023-05-19 PROCEDURE — ? PRESCRIPTION

## 2023-05-19 RX ORDER — DOXYCYCLINE HYCLATE 100 MG/1
CAPSULE, GELATIN COATED ORAL
Qty: 30 | Refills: 1 | Status: ERX | COMMUNITY
Start: 2023-05-19

## 2023-05-19 RX ORDER — MINOCYCLINE HYDROCHLORIDE 100 MG/1
CAPSULE ORAL
Qty: 30 | Refills: 0 | Status: ERX | COMMUNITY
Start: 2023-05-19

## 2023-05-19 RX ORDER — TRIFAROTENE 50 UG/G
CREAM TOPICAL
Qty: 45 | Refills: 5 | Status: ERX

## 2023-05-19 RX ORDER — CLASCOTERONE 1 G/100G
CREAM TOPICAL
Qty: 60 | Refills: 5 | Status: ERX

## 2023-05-19 RX ADMIN — MINOCYCLINE HYDROCHLORIDE: 100 CAPSULE ORAL at 00:00

## 2023-05-19 RX ADMIN — DOXYCYCLINE HYCLATE: 100 CAPSULE, GELATIN COATED ORAL at 00:00

## 2023-05-19 ASSESSMENT — SEVERITY ASSESSMENT OVERALL AMONG ALL PATIENTS
IN YOUR EXPERIENCE, AMONG ALL PATIENTS YOU HAVE SEEN WITH THIS CONDITION, HOW SEVERE IS THIS PATIENT'S CONDITION?: ALMOST CLEAR

## 2023-05-19 ASSESSMENT — LOCATION DETAILED DESCRIPTION DERM
LOCATION DETAILED: LEFT CENTRAL BUCCAL CHEEK
LOCATION DETAILED: RIGHT INFERIOR MEDIAL MALAR CHEEK

## 2023-05-19 ASSESSMENT — LOCATION SIMPLE DESCRIPTION DERM
LOCATION SIMPLE: LEFT CHEEK
LOCATION SIMPLE: RIGHT CHEEK

## 2023-05-19 ASSESSMENT — LOCATION ZONE DERM: LOCATION ZONE: FACE

## 2023-05-19 ASSESSMENT — INVESTIGATOR STATIC GLOBAL ASSESSMENT: IN YOUR EXPERIENCE, AMONG ALL PATIENTS YOU HAVE SEEN WITH THIS CONDITION, HOW SEVERE IS THIS PATIENT'S CONDITION?: MILD

## 2023-05-19 NOTE — PROCEDURE: PRESCRIPTION MEDICATION MANAGEMENT
Continue Regimen: Doxycycline 100mg take once daily for 1-2 weeks for flares\\n\\nAklief apply to face once daily\\n\\nWinlevi apply to face bid
Detail Level: Zone
Render In Strict Bullet Format?: No

## 2023-05-19 NOTE — PROCEDURE: COUNSELING
Isotretinoin Pregnancy And Lactation Text: This medication is Pregnancy Category X and is considered extremely dangerous during pregnancy. It is unknown if it is excreted in breast milk.
Minocycline Pregnancy And Lactation Text: This medication is Pregnancy Category D and not consider safe during pregnancy. It is also excreted in breast milk.
Azithromycin Counseling:  I discussed with the patient the risks of azithromycin including but not limited to GI upset, allergic reaction, drug rash, diarrhea, and yeast infections.
Tazorac Pregnancy And Lactation Text: This medication is not safe during pregnancy. It is unknown if this medication is excreted in breast milk.
Dapsone Counseling: I discussed with the patient the risks of dapsone including but not limited to hemolytic anemia, agranulocytosis, rashes, methemoglobinemia, kidney failure, peripheral neuropathy, headaches, GI upset, and liver toxicity.  Patients who start dapsone require monitoring including baseline LFTs and weekly CBCs for the first month, then every month thereafter.  The patient verbalized understanding of the proper use and possible adverse effects of dapsone.  All of the patient's questions and concerns were addressed.
Spironolactone Counseling: Patient advised regarding risks of diarrhea, abdominal pain, hyperkalemia, birth defects (for female patients), liver toxicity and renal toxicity. The patient may need blood work to monitor liver and kidney function and potassium levels while on therapy. The patient verbalized understanding of the proper use and possible adverse effects of spironolactone.  All of the patient's questions and concerns were addressed.
Spironolactone Pregnancy And Lactation Text: This medication can cause feminization of the male fetus and should be avoided during pregnancy. The active metabolite is also found in breast milk.
Minocycline Counseling: Patient advised regarding possible photosensitivity and discoloration of the teeth, skin, lips, tongue and gums.  Patient instructed to avoid sunlight, if possible.  When exposed to sunlight, patients should wear protective clothing, sunglasses, and sunscreen.  The patient was instructed to call the office immediately if the following severe adverse effects occur:  hearing changes, easy bruising/bleeding, severe headache, or vision changes.  The patient verbalized understanding of the proper use and possible adverse effects of minocycline.  All of the patient's questions and concerns were addressed.
Birth Control Pills Counseling: Birth Control Pill Counseling: I discussed with the patient the potential side effects of OCPs including but not limited to increased risk of stroke, heart attack, thrombophlebitis, deep venous thrombosis, hepatic adenomas, breast changes, GI upset, headaches, and depression.  The patient verbalized understanding of the proper use and possible adverse effects of OCPs. All of the patient's questions and concerns were addressed.
Topical Sulfur Applications Pregnancy And Lactation Text: This medication is Pregnancy Category C and has an unknown safety profile during pregnancy. It is unknown if this topical medication is excreted in breast milk.
Topical Clindamycin Pregnancy And Lactation Text: This medication is Pregnancy Category B and is considered safe during pregnancy. It is unknown if it is excreted in breast milk.
High Dose Vitamin A Pregnancy And Lactation Text: High dose vitamin A therapy is contraindicated during pregnancy and breast feeding.
Doxycycline Pregnancy And Lactation Text: This medication is Pregnancy Category D and not consider safe during pregnancy. It is also excreted in breast milk but is considered safe for shorter treatment courses.
High Dose Vitamin A Counseling: Side effects reviewed, pt to contact office should one occur.
Benzoyl Peroxide Counseling: Patient counseled that medicine may cause skin irritation and bleach clothing.  In the event of skin irritation, the patient was advised to reduce the amount of the drug applied or use it less frequently.   The patient verbalized understanding of the proper use and possible adverse effects of benzoyl peroxide.  All of the patient's questions and concerns were addressed.
Topical Retinoid counseling:  Patient advised to apply a pea-sized amount only at bedtime and wait 30 minutes after washing their face before applying.  If too drying, patient may add a non-comedogenic moisturizer. The patient verbalized understanding of the proper use and possible adverse effects of retinoids.  All of the patient's questions and concerns were addressed.
Dapsone Pregnancy And Lactation Text: This medication is Pregnancy Category C and is not considered safe during pregnancy or breast feeding.
Topical Clindamycin Counseling: Patient counseled that this medication may cause skin irritation or allergic reactions.  In the event of skin irritation, the patient was advised to reduce the amount of the drug applied or use it less frequently.   The patient verbalized understanding of the proper use and possible adverse effects of clindamycin.  All of the patient's questions and concerns were addressed.
Birth Control Pills Pregnancy And Lactation Text: This medication should be avoided if pregnant and for the first 30 days post-partum.
Detail Level: Zone
Isotretinoin Counseling: Patient should get monthly blood tests, not donate blood, not drive at night if vision affected, not share medication, and not undergo elective surgery for 6 months after tx completed. Side effects reviewed, pt to contact office should one occur.
Erythromycin Pregnancy And Lactation Text: This medication is Pregnancy Category B and is considered safe during pregnancy. It is also excreted in breast milk.
Benzoyl Peroxide Pregnancy And Lactation Text: This medication is Pregnancy Category C. It is unknown if benzoyl peroxide is excreted in breast milk.
Sarecycline Counseling: Patient advised regarding possible photosensitivity and discoloration of the teeth, skin, lips, tongue and gums.  Patient instructed to avoid sunlight, if possible.  When exposed to sunlight, patients should wear protective clothing, sunglasses, and sunscreen.  The patient was instructed to call the office immediately if the following severe adverse effects occur:  hearing changes, easy bruising/bleeding, severe headache, or vision changes.  The patient verbalized understanding of the proper use and possible adverse effects of sarecycline.  All of the patient's questions and concerns were addressed.
Tetracycline Counseling: Patient counseled regarding possible photosensitivity and increased risk for sunburn.  Patient instructed to avoid sunlight, if possible.  When exposed to sunlight, patients should wear protective clothing, sunglasses, and sunscreen.  The patient was instructed to call the office immediately if the following severe adverse effects occur:  hearing changes, easy bruising/bleeding, severe headache, or vision changes.  The patient verbalized understanding of the proper use and possible adverse effects of tetracycline.  All of the patient's questions and concerns were addressed. Patient understands to avoid pregnancy while on therapy due to potential birth defects.
Include Pregnancy/Lactation Warning?: No
Topical Retinoid Pregnancy And Lactation Text: This medication is Pregnancy Category C. It is unknown if this medication is excreted in breast milk.
Erythromycin Counseling:  I discussed with the patient the risks of erythromycin including but not limited to GI upset, allergic reaction, drug rash, diarrhea, increase in liver enzymes, and yeast infections.
Topical Sulfur Applications Counseling: Topical Sulfur Counseling: Patient counseled that this medication may cause skin irritation or allergic reactions.  In the event of skin irritation, the patient was advised to reduce the amount of the drug applied or use it less frequently.   The patient verbalized understanding of the proper use and possible adverse effects of topical sulfur application.  All of the patient's questions and concerns were addressed.
Bactrim Pregnancy And Lactation Text: This medication is Pregnancy Category D and is known to cause fetal risk.  It is also excreted in breast milk.
Azithromycin Pregnancy And Lactation Text: This medication is considered safe during pregnancy and is also secreted in breast milk.
Bactrim Counseling:  I discussed with the patient the risks of sulfa antibiotics including but not limited to GI upset, allergic reaction, drug rash, diarrhea, dizziness, photosensitivity, and yeast infections.  Rarely, more serious reactions can occur including but not limited to aplastic anemia, agranulocytosis, methemoglobinemia, blood dyscrasias, liver or kidney failure, lung infiltrates or desquamative/blistering drug rashes.
Tazorac Counseling:  Patient advised that medication is irritating and drying.  Patient may need to apply sparingly and wash off after an hour before eventually leaving it on overnight.  The patient verbalized understanding of the proper use and possible adverse effects of tazorac.  All of the patient's questions and concerns were addressed.
Doxycycline Counseling:  Patient counseled regarding possible photosensitivity and increased risk for sunburn.  Patient instructed to avoid sunlight, if possible.  When exposed to sunlight, patients should wear protective clothing, sunglasses, and sunscreen.  The patient was instructed to call the office immediately if the following severe adverse effects occur:  hearing changes, easy bruising/bleeding, severe headache, or vision changes.  The patient verbalized understanding of the proper use and possible adverse effects of doxycycline.  All of the patient's questions and concerns were addressed.
Detail Level: Simple

## 2023-05-19 NOTE — PROCEDURE: OTHER
Other (Free Text): Present for about 2 months.\\n\\nPatient requested fluconazole but too many drug interactions. Advised she avoid this medication and use otc Monistat if she does develop yeast infection. Also discussed probiotics
Detail Level: Zone
Render Risk Assessment In Note?: no
Note Text (......Xxx Chief Complaint.): This diagnosis correlates with the

## 2023-05-19 NOTE — PROCEDURE: MIPS QUALITY
Detail Level: Generalized
Quality 130: Documentation Of Current Medications In The Medical Record: Current Medications Documented
Quality 110: Preventive Care And Screening: Influenza Immunization: Influenza Immunization previously received during influenza season
Quality 431: Preventive Care And Screening: Unhealthy Alcohol Use - Screening: Patient did not receive brief counseling if identified as an unhealthy alcohol user, reason not given
Quality 226: Preventive Care And Screening: Tobacco Use: Screening And Cessation Intervention: Tobacco Screening not Performed for Unknown Reasons

## 2023-05-30 ENCOUNTER — OFFICE VISIT (OUTPATIENT)
Age: 58
End: 2023-05-30
Payer: COMMERCIAL

## 2023-05-30 VITALS
DIASTOLIC BLOOD PRESSURE: 80 MMHG | WEIGHT: 209 LBS | BODY MASS INDEX: 35.68 KG/M2 | HEART RATE: 82 BPM | HEIGHT: 64 IN | SYSTOLIC BLOOD PRESSURE: 138 MMHG

## 2023-05-30 DIAGNOSIS — I49.3 PVC (PREMATURE VENTRICULAR CONTRACTION): Primary | ICD-10-CM

## 2023-05-30 DIAGNOSIS — E66.01 SEVERE OBESITY (BMI 35.0-39.9) WITH COMORBIDITY (HCC): ICD-10-CM

## 2023-05-30 DIAGNOSIS — Z86.711 HX PULMONARY EMBOLISM: ICD-10-CM

## 2023-05-30 DIAGNOSIS — I10 PRIMARY HYPERTENSION: ICD-10-CM

## 2023-05-30 PROCEDURE — 3079F DIAST BP 80-89 MM HG: CPT | Performed by: INTERNAL MEDICINE

## 2023-05-30 PROCEDURE — 3075F SYST BP GE 130 - 139MM HG: CPT | Performed by: INTERNAL MEDICINE

## 2023-05-30 PROCEDURE — 99214 OFFICE O/P EST MOD 30 MIN: CPT | Performed by: INTERNAL MEDICINE

## 2023-05-30 RX ORDER — LOSARTAN POTASSIUM 25 MG/1
TABLET ORAL
Qty: 90 TABLET | Refills: 3 | Status: SHIPPED | OUTPATIENT
Start: 2023-05-30

## 2023-05-30 RX ORDER — FLECAINIDE ACETATE 100 MG/1
100 TABLET ORAL 2 TIMES DAILY
Qty: 180 TABLET | Refills: 3 | Status: SHIPPED | OUTPATIENT
Start: 2023-05-30

## 2023-05-30 ASSESSMENT — ENCOUNTER SYMPTOMS
COUGH: 0
APHONIA: 0
ABDOMINAL PAIN: 0
STRIDOR: 0
NAIL CHANGES: 0
EYE PAIN: 0

## 2023-05-30 NOTE — PROGRESS NOTES
Thromboembolus ([facility] [address]     PE & DVT     Past Surgical History:   Procedure Laterality Date    BREAST LUMPECTOMY Left     benign    CARDIAC CATHETERIZATION      CHOLECYSTECTOMY, LAPAROSCOPIC      COLONOSCOPY      GYN      laparoscopies for endometriosis- x 4    HERNIA REPAIR N/A 10/07/2022    HERNIA EPIGASTRIC REPAIR LAPAROSCOPIC ROBOTIC performed by Charle Claude, DO at [facility] MAIN OR     Family History   Problem Relation Age of Onset    Other Maternal Grandfather         lymphoma    Pulmonary Embolism Paternal Grandfather     Other Paternal Grandfather         brain tumor    Pulmonary Embolism Other     Hypertension Mother     Diabetes Father       Social History     Tobacco Use    Smoking status: Former     Packs/day: 1.00     Types: Cigarettes     Quit date: 2007     Years since quittin.4    Smokeless tobacco: Never   Substance Use Topics    Alcohol use: Yes     Alcohol/week: 4.0 standard drinks     Types: 1 Glasses of wine, 3 Cans of beer per week     Comment: occasional       ROS:    Review of Systems   Constitutional: Negative for fever. HENT:  Negative for stridor. Eyes:  Negative for pain. Cardiovascular:  Negative for chest pain. Respiratory:  Negative for cough. Endocrine: Negative for cold intolerance. Skin:  Negative for nail changes. Musculoskeletal:  Negative for arthritis. Gastrointestinal:  Negative for abdominal pain. Genitourinary:  Negative for dysuria. Neurological:  Negative for aphonia. Psychiatric/Behavioral:  Negative for altered mental status. Allergic/Immunologic: Negative for hives.          PHYSICAL EXAM:    /80   Pulse 82   Ht 5' 4\" (1.626 m)   Wt 209 lb (94.8 kg)   BMI 35.87 kg/m²        Wt Readings from Last 3 Encounters:   23 209 lb (94.8 kg)   23 205 lb (93 kg)   22 201 lb (91.2 kg)     BP Readings from Last 3 Encounters:   23 138/80   23 116/86   22 122/72         Physical Exam  Vitals

## 2023-06-19 ASSESSMENT — ENCOUNTER SYMPTOMS
ORTHOPNEA: 0
BLURRED VISION: 0
SHORTNESS OF BREATH: 0

## 2023-06-20 ENCOUNTER — OFFICE VISIT (OUTPATIENT)
Dept: FAMILY MEDICINE CLINIC | Facility: CLINIC | Age: 58
End: 2023-06-20
Payer: COMMERCIAL

## 2023-06-20 VITALS
RESPIRATION RATE: 16 BRPM | TEMPERATURE: 98.2 F | HEART RATE: 63 BPM | WEIGHT: 213 LBS | HEIGHT: 64 IN | DIASTOLIC BLOOD PRESSURE: 74 MMHG | SYSTOLIC BLOOD PRESSURE: 123 MMHG | BODY MASS INDEX: 36.37 KG/M2 | OXYGEN SATURATION: 97 %

## 2023-06-20 DIAGNOSIS — J30.1 NON-SEASONAL ALLERGIC RHINITIS DUE TO POLLEN: ICD-10-CM

## 2023-06-20 DIAGNOSIS — Z12.31 ENCOUNTER FOR SCREENING MAMMOGRAM FOR MALIGNANT NEOPLASM OF BREAST: ICD-10-CM

## 2023-06-20 DIAGNOSIS — K20.90 ESOPHAGITIS: ICD-10-CM

## 2023-06-20 DIAGNOSIS — H69.83 ETD (EUSTACHIAN TUBE DYSFUNCTION), BILATERAL: ICD-10-CM

## 2023-06-20 DIAGNOSIS — I10 PRIMARY HYPERTENSION: Primary | ICD-10-CM

## 2023-06-20 DIAGNOSIS — K44.9 HIATAL HERNIA: ICD-10-CM

## 2023-06-20 DIAGNOSIS — Z12.11 SCREEN FOR COLON CANCER: ICD-10-CM

## 2023-06-20 DIAGNOSIS — F41.1 GAD (GENERALIZED ANXIETY DISORDER): ICD-10-CM

## 2023-06-20 PROCEDURE — 99214 OFFICE O/P EST MOD 30 MIN: CPT | Performed by: FAMILY MEDICINE

## 2023-06-20 PROCEDURE — 3074F SYST BP LT 130 MM HG: CPT | Performed by: FAMILY MEDICINE

## 2023-06-20 PROCEDURE — 3078F DIAST BP <80 MM HG: CPT | Performed by: FAMILY MEDICINE

## 2023-06-20 RX ORDER — ESCITALOPRAM OXALATE 5 MG/1
5 TABLET ORAL DAILY
Qty: 90 TABLET | Refills: 1 | Status: SHIPPED | OUTPATIENT
Start: 2023-06-20

## 2023-06-20 RX ORDER — BUPROPION HYDROCHLORIDE 150 MG/1
TABLET ORAL
Qty: 90 TABLET | Refills: 1 | Status: SHIPPED | OUTPATIENT
Start: 2023-06-20

## 2023-06-20 RX ORDER — FLUTICASONE PROPIONATE 50 MCG
2 SPRAY, SUSPENSION (ML) NASAL DAILY
Qty: 48 G | Refills: 5 | Status: SHIPPED | OUTPATIENT
Start: 2023-06-20

## 2023-06-20 RX ORDER — PANTOPRAZOLE SODIUM 40 MG/1
TABLET, DELAYED RELEASE ORAL
Qty: 90 TABLET | Refills: 1 | Status: SHIPPED | OUTPATIENT
Start: 2023-06-20

## 2023-06-20 RX ORDER — MONTELUKAST SODIUM 10 MG/1
TABLET ORAL
Qty: 90 TABLET | Refills: 1 | Status: SHIPPED | OUTPATIENT
Start: 2023-06-20

## 2023-06-20 RX ORDER — OLOPATADINE HYDROCHLORIDE 665 UG/1
2 SPRAY NASAL 2 TIMES DAILY
Qty: 1 EACH | Refills: 5 | Status: SHIPPED | OUTPATIENT
Start: 2023-06-20

## 2023-06-20 SDOH — ECONOMIC STABILITY: FOOD INSECURITY: WITHIN THE PAST 12 MONTHS, THE FOOD YOU BOUGHT JUST DIDN'T LAST AND YOU DIDN'T HAVE MONEY TO GET MORE.: NEVER TRUE

## 2023-06-20 SDOH — ECONOMIC STABILITY: HOUSING INSECURITY
IN THE LAST 12 MONTHS, WAS THERE A TIME WHEN YOU DID NOT HAVE A STEADY PLACE TO SLEEP OR SLEPT IN A SHELTER (INCLUDING NOW)?: NO

## 2023-06-20 SDOH — ECONOMIC STABILITY: INCOME INSECURITY: HOW HARD IS IT FOR YOU TO PAY FOR THE VERY BASICS LIKE FOOD, HOUSING, MEDICAL CARE, AND HEATING?: NOT HARD AT ALL

## 2023-06-20 SDOH — ECONOMIC STABILITY: FOOD INSECURITY: WITHIN THE PAST 12 MONTHS, YOU WORRIED THAT YOUR FOOD WOULD RUN OUT BEFORE YOU GOT MONEY TO BUY MORE.: NEVER TRUE

## 2023-06-20 ASSESSMENT — PATIENT HEALTH QUESTIONNAIRE - PHQ9
SUM OF ALL RESPONSES TO PHQ QUESTIONS 1-9: 0
SUM OF ALL RESPONSES TO PHQ9 QUESTIONS 1 & 2: 0
SUM OF ALL RESPONSES TO PHQ QUESTIONS 1-9: 0
1. LITTLE INTEREST OR PLEASURE IN DOING THINGS: 0
SUM OF ALL RESPONSES TO PHQ QUESTIONS 1-9: 0
2. FEELING DOWN, DEPRESSED OR HOPELESS: 0
SUM OF ALL RESPONSES TO PHQ QUESTIONS 1-9: 0

## 2023-06-26 PROBLEM — F41.1 GAD (GENERALIZED ANXIETY DISORDER): Status: ACTIVE | Noted: 2023-06-26

## 2023-06-26 ASSESSMENT — ENCOUNTER SYMPTOMS
RHINORRHEA: 0
ABDOMINAL PAIN: 0
COUGH: 0
DIARRHEA: 0
NAUSEA: 0
CONSTIPATION: 0
WHEEZING: 0
VOMITING: 0

## 2023-11-28 ENCOUNTER — TELEPHONE (OUTPATIENT)
Age: 58
End: 2023-11-28

## 2023-11-28 RX ORDER — ROSUVASTATIN CALCIUM 10 MG/1
10 TABLET, COATED ORAL DAILY
Qty: 90 TABLET | Refills: 3 | Status: SHIPPED | OUTPATIENT
Start: 2023-11-28

## 2023-11-28 NOTE — TELEPHONE ENCOUNTER
Requested Prescriptions     Pending Prescriptions Disp Refills    rosuvastatin (CRESTOR) 10 MG tablet [Pharmacy Med Name: Rosuvastatin Calcium 10 MG Oral Tablet] 90 tablet 3     Sig: Take 1 tablet by mouth once daily     Verified rx. Last OV 05/30/2023. Erx to pharm on file.

## 2023-11-28 NOTE — TELEPHONE ENCOUNTER
Pt lvm on rx line stating she needs refills on her 10mg rosuvastatin sent to the [facility] on [id number] [facility]

## 2023-12-20 RX ORDER — PANTOPRAZOLE SODIUM 40 MG/1
TABLET, DELAYED RELEASE ORAL
Qty: 90 TABLET | Refills: 1 | Status: CANCELLED | OUTPATIENT
Start: 2023-12-20

## 2023-12-20 RX ORDER — GABAPENTIN 100 MG/1
100 CAPSULE ORAL 3 TIMES DAILY
Qty: 30 CAPSULE | Refills: 0 | Status: CANCELLED | OUTPATIENT
Start: 2023-12-20 | End: 2023-12-30

## 2023-12-22 ENCOUNTER — OFFICE VISIT (OUTPATIENT)
Dept: FAMILY MEDICINE CLINIC | Facility: CLINIC | Age: 58
End: 2023-12-22
Payer: COMMERCIAL

## 2023-12-22 VITALS
HEART RATE: 82 BPM | DIASTOLIC BLOOD PRESSURE: 70 MMHG | BODY MASS INDEX: 33.8 KG/M2 | RESPIRATION RATE: 16 BRPM | HEIGHT: 64 IN | OXYGEN SATURATION: 98 % | TEMPERATURE: 98.4 F | WEIGHT: 198 LBS | SYSTOLIC BLOOD PRESSURE: 121 MMHG

## 2023-12-22 DIAGNOSIS — F39 UNSPECIFIED MOOD (AFFECTIVE) DISORDER (HCC): ICD-10-CM

## 2023-12-22 DIAGNOSIS — I26.99 OTHER ACUTE PULMONARY EMBOLISM, UNSPECIFIED WHETHER ACUTE COR PULMONALE PRESENT (HCC): ICD-10-CM

## 2023-12-22 DIAGNOSIS — K44.9 HIATAL HERNIA: ICD-10-CM

## 2023-12-22 DIAGNOSIS — I10 PRIMARY HYPERTENSION: Primary | ICD-10-CM

## 2023-12-22 DIAGNOSIS — F41.1 GAD (GENERALIZED ANXIETY DISORDER): ICD-10-CM

## 2023-12-22 DIAGNOSIS — Z23 ENCOUNTER FOR IMMUNIZATION: ICD-10-CM

## 2023-12-22 DIAGNOSIS — K20.90 ESOPHAGITIS: ICD-10-CM

## 2023-12-22 DIAGNOSIS — B37.2 CANDIDAL DERMATITIS: ICD-10-CM

## 2023-12-22 DIAGNOSIS — J30.1 NON-SEASONAL ALLERGIC RHINITIS DUE TO POLLEN: ICD-10-CM

## 2023-12-22 PROCEDURE — 3078F DIAST BP <80 MM HG: CPT | Performed by: FAMILY MEDICINE

## 2023-12-22 PROCEDURE — 3074F SYST BP LT 130 MM HG: CPT | Performed by: FAMILY MEDICINE

## 2023-12-22 PROCEDURE — 90674 CCIIV4 VAC NO PRSV 0.5 ML IM: CPT | Performed by: FAMILY MEDICINE

## 2023-12-22 PROCEDURE — 99214 OFFICE O/P EST MOD 30 MIN: CPT | Performed by: FAMILY MEDICINE

## 2023-12-22 PROCEDURE — 90471 IMMUNIZATION ADMIN: CPT | Performed by: FAMILY MEDICINE

## 2023-12-22 RX ORDER — CLOTRIMAZOLE AND BETAMETHASONE DIPROPIONATE 10; .64 MG/G; MG/G
CREAM TOPICAL
Qty: 45 G | Refills: 0 | Status: SHIPPED | OUTPATIENT
Start: 2023-12-22

## 2023-12-22 RX ORDER — ESCITALOPRAM OXALATE 5 MG/1
5 TABLET ORAL DAILY
Qty: 90 TABLET | Refills: 1 | Status: SHIPPED | OUTPATIENT
Start: 2023-12-22

## 2023-12-22 RX ORDER — MONTELUKAST SODIUM 10 MG/1
TABLET ORAL
Qty: 90 TABLET | Refills: 1 | Status: SHIPPED | OUTPATIENT
Start: 2023-12-22

## 2023-12-22 RX ORDER — BUPROPION HYDROCHLORIDE 150 MG/1
TABLET ORAL
Qty: 90 TABLET | Refills: 1 | Status: SHIPPED | OUTPATIENT
Start: 2023-12-22

## 2023-12-22 RX ORDER — PANTOPRAZOLE SODIUM 40 MG/1
TABLET, DELAYED RELEASE ORAL
Qty: 90 TABLET | Refills: 1 | Status: SHIPPED | OUTPATIENT
Start: 2023-12-22

## 2023-12-22 NOTE — PROGRESS NOTES
HISTORY OF PRESENT ILLNESS  Chief Complaint   Patient presents with    Medication Refill    Rash     On abdomen started two days ago; states this looks like blisters     Rash On abdomen started two days ago; states this looks like blisters    Had a uri - finished up the med last week. Subjective:   Dirk Laws is a 66 y.o. female with hypertension. Hypertension ROS: taking medications as instructed, no medication side effects noted, no TIA's, no chest pain on exertion, no dyspnea on exertion, no swelling of ankles. Key CAD CHF Meds            rosuvastatin (CRESTOR) 10 MG tablet (Taking)    Sig - Route: Take 1 tablet by mouth once daily - Oral    losartan (COZAAR) 25 MG tablet (Taking)    Sig: TAKE 1 TABLET BY MOUTH ONCE DAILY    verapamil (CALAN SR) 180 MG extended release tablet (Taking)    Sig - Route:  Take 1 tablet by mouth daily - Oral           Lab Results   Component Value Date/Time     06/08/2023 09:08 AM    K 4.1 06/08/2023 09:08 AM     06/08/2023 09:08 AM    CO2 27 06/08/2023 09:08 AM    BUN 14 06/08/2023 09:08 AM    CREATININE 0.90 06/08/2023 09:08 AM    GLUCOSE 83 06/08/2023 09:08 AM    CALCIUM 8.8 06/08/2023 09:08 AM       Lab Results   Component Value Date    CREATININE 0.90 06/08/2023      Lab Results   Component Value Date    CHOL 159 06/08/2023    CHOL 145 12/01/2021    CHOL 150 06/15/2020     Lab Results   Component Value Date    TRIG 80 06/08/2023    TRIG 90 12/01/2021    TRIG 75 06/15/2020     Lab Results   Component Value Date    HDL 70 (H) 06/08/2023    HDL 60 12/01/2021    HDL 50 06/15/2020     Lab Results   Component Value Date    LDLCALC 73 06/08/2023    LDLCALC 68 12/01/2021    LDLCALC 85 06/15/2020     Lab Results   Component Value Date    LABVLDL 16 06/08/2023    LABVLDL 15 06/15/2020    VLDL 17 12/01/2021     Lab Results   Component Value Date    CHOLHDLRATIO 2.3 06/08/2023    CHOLHDLRATIO 3.0 06/15/2020      No results found for: \"MALBCR\"   BP

## 2024-01-02 ASSESSMENT — ENCOUNTER SYMPTOMS
BLURRED VISION: 0
NAIL CHANGES: 0
ORTHOPNEA: 0

## 2024-01-03 ENCOUNTER — OFFICE VISIT (OUTPATIENT)
Dept: FAMILY MEDICINE CLINIC | Facility: CLINIC | Age: 59
End: 2024-01-03
Payer: COMMERCIAL

## 2024-01-03 VITALS
TEMPERATURE: 98.1 F | HEIGHT: 64 IN | HEART RATE: 94 BPM | SYSTOLIC BLOOD PRESSURE: 170 MMHG | WEIGHT: 200 LBS | BODY MASS INDEX: 34.15 KG/M2 | RESPIRATION RATE: 16 BRPM | OXYGEN SATURATION: 98 % | DIASTOLIC BLOOD PRESSURE: 107 MMHG

## 2024-01-03 DIAGNOSIS — B96.89 ACUTE BACTERIAL SINUSITIS: ICD-10-CM

## 2024-01-03 DIAGNOSIS — I10 HYPERTENSION, UNSPECIFIED TYPE: ICD-10-CM

## 2024-01-03 DIAGNOSIS — J01.90 ACUTE BACTERIAL SINUSITIS: ICD-10-CM

## 2024-01-03 DIAGNOSIS — F39 UNSPECIFIED MOOD (AFFECTIVE) DISORDER (HCC): ICD-10-CM

## 2024-01-03 DIAGNOSIS — I26.99 OTHER ACUTE PULMONARY EMBOLISM, UNSPECIFIED WHETHER ACUTE COR PULMONALE PRESENT (HCC): ICD-10-CM

## 2024-01-03 DIAGNOSIS — J30.1 NON-SEASONAL ALLERGIC RHINITIS DUE TO POLLEN: ICD-10-CM

## 2024-01-03 DIAGNOSIS — J20.9 ACUTE BRONCHITIS, UNSPECIFIED ORGANISM: Primary | ICD-10-CM

## 2024-01-03 PROCEDURE — 99214 OFFICE O/P EST MOD 30 MIN: CPT | Performed by: FAMILY MEDICINE

## 2024-01-03 PROCEDURE — 3077F SYST BP >= 140 MM HG: CPT | Performed by: FAMILY MEDICINE

## 2024-01-03 PROCEDURE — 3080F DIAST BP >= 90 MM HG: CPT | Performed by: FAMILY MEDICINE

## 2024-01-03 RX ORDER — DOXYCYCLINE HYCLATE 100 MG
TABLET ORAL
COMMUNITY
Start: 2023-12-12 | End: 2024-01-03

## 2024-01-03 RX ORDER — AMOXICILLIN AND CLAVULANATE POTASSIUM 875; 125 MG/1; MG/1
1 TABLET, FILM COATED ORAL 2 TIMES DAILY
Qty: 20 TABLET | Refills: 0 | Status: SHIPPED | OUTPATIENT
Start: 2024-01-03 | End: 2024-01-13

## 2024-01-03 RX ORDER — METHYLPREDNISOLONE 4 MG/1
TABLET ORAL
COMMUNITY
Start: 2023-12-12

## 2024-01-03 RX ORDER — GUAIFENESIN 600 MG/1
1200 TABLET, EXTENDED RELEASE ORAL 2 TIMES DAILY
Qty: 30 TABLET | Refills: 0 | Status: SHIPPED | OUTPATIENT
Start: 2024-01-03 | End: 2024-01-18

## 2024-01-03 RX ORDER — ALBUTEROL SULFATE 90 UG/1
AEROSOL, METERED RESPIRATORY (INHALATION)
Qty: 18 G | Refills: 2 | Status: SHIPPED | OUTPATIENT
Start: 2024-01-03

## 2024-01-03 ASSESSMENT — ENCOUNTER SYMPTOMS
VOMITING: 0
CONSTIPATION: 0
COUGH: 0
SORE THROAT: 1
WHEEZING: 0
SHORTNESS OF BREATH: 0
DIARRHEA: 1
RHINORRHEA: 0
ABDOMINAL PAIN: 0
SINUS PRESSURE: 1
NAUSEA: 0

## 2024-01-03 ASSESSMENT — PATIENT HEALTH QUESTIONNAIRE - PHQ9
1. LITTLE INTEREST OR PLEASURE IN DOING THINGS: 0
SUM OF ALL RESPONSES TO PHQ QUESTIONS 1-9: 0
SUM OF ALL RESPONSES TO PHQ QUESTIONS 1-9: 0
2. FEELING DOWN, DEPRESSED OR HOPELESS: 0
SUM OF ALL RESPONSES TO PHQ QUESTIONS 1-9: 0
SUM OF ALL RESPONSES TO PHQ9 QUESTIONS 1 & 2: 0
SUM OF ALL RESPONSES TO PHQ QUESTIONS 1-9: 0

## 2024-01-03 NOTE — PROGRESS NOTES
HISTORY OF PRESENT ILLNESS  Chief Complaint   Patient presents with    Cough     Had URI at the beginning of December and was given doxycycline and a steroid and was getting better and cough and nasal congestion came back right before wilmar     Nasal Congestion       Had URI at the beginning of December and was given doxycycline and a steroid and was getting better and cough and nasal congestion came back right before wilmar    Got some better and now coughing her head off.  ??PND  Productive thick sputum with hard cough.    Mom has bronchitis,   sick    No fever.    Using the mucinex  whisky and honey vicks, advil cough drops.    Previously said, \" 12/12/2023 [facility]  Patient presents with: body aches, fever, fatigue and chills   Presenting symptoms: cough, fatigue, fever, headaches, myalgias, rhinorrhea and shortness of breath   Onset quality: Gradual  Duration of current symptoms: 8 days  Timing: Unchanged  Chronicity: New  Treatments tried: OTC medication (vicks, ibuprofen)  Response to treatment: No relief  Associated symptoms: chills and nasal congestion   1. Acute non-recurrent maxillary sinusitis   2. Hypertension, unspecified type   3. Acute bronchitis, unspecified organism   4. Acute cough    doxycycline (VIBRA-TABS) 100 MG tablet 14 tablet 0   Sig: Take 1 tablet (100 mg total) by mouth 2 (two) times a day for 7 days Not for pregnancy/lactation    methylPREDNISolone (MEDROL DOSEPACK) \"      Subjective:   Yeni Yepez is a 58 y.o. female with hypertension.    Hypertension ROS: taking medications as instructed, no medication side effects noted, no TIA's, no chest pain on exertion, no dyspnea on exertion, no swelling of ankles.   Key CAD CHF Meds            rosuvastatin (CRESTOR) 10 MG tablet (Taking)    Sig - Route: Take 1 tablet by mouth once daily - Oral    losartan (COZAAR) 25 MG tablet (Taking)    Sig: TAKE 1 TABLET BY MOUTH ONCE DAILY    verapamil (CALAN SR)

## 2024-04-04 ENCOUNTER — CLINICAL DOCUMENTATION (OUTPATIENT)
Dept: SURGERY | Age: 59
End: 2024-04-04

## 2024-04-04 NOTE — PROGRESS NOTES
I have reviewed the patient's chart and consider the patient an acceptable risk for screening colonoscopy without a formal office visit.  We will contact the patient to give the details of the bowel prep and to schedule screening colonoscopy in the near future.     Colonoscopy: history of but no details/dates in Epic.    Anticoagulation: none  Family Hx: non contributory  Patient on FLECANIDE    Once the colonoscopy has been completed, the Health Maintenance will be updated accordingly.     JACOB REED, APRN - CNP

## 2024-04-09 ENCOUNTER — PREP FOR PROCEDURE (OUTPATIENT)
Dept: SURGERY | Age: 59
End: 2024-04-09

## 2024-04-09 DIAGNOSIS — Z12.11 SPECIAL SCREENING FOR MALIGNANT NEOPLASMS, COLON: ICD-10-CM

## 2024-05-09 PROBLEM — Z12.11 SPECIAL SCREENING FOR MALIGNANT NEOPLASMS, COLON: Status: RESOLVED | Noted: 2024-04-09 | Resolved: 2024-05-09

## 2024-05-14 RX ORDER — SODIUM CHLORIDE 0.9 % (FLUSH) 0.9 %
5-40 SYRINGE (ML) INJECTION EVERY 12 HOURS SCHEDULED
Status: CANCELLED | OUTPATIENT
Start: 2024-05-14

## 2024-05-14 RX ORDER — SODIUM CHLORIDE 9 MG/ML
INJECTION, SOLUTION INTRAVENOUS PRN
Status: CANCELLED | OUTPATIENT
Start: 2024-05-14

## 2024-05-14 RX ORDER — SODIUM CHLORIDE 0.9 % (FLUSH) 0.9 %
5-40 SYRINGE (ML) INJECTION PRN
Status: CANCELLED | OUTPATIENT
Start: 2024-05-14

## 2024-05-17 PROBLEM — Z12.11 SPECIAL SCREENING FOR MALIGNANT NEOPLASMS, COLON: Status: ACTIVE | Noted: 2024-04-09

## 2024-05-22 NOTE — PERIOP NOTE
Patient verified name, , and procedure.    Type: 1a; abbreviated assessment per anesthesia guidelines    Labs per anesthesia: none    Instructed pt that they will be notified the day before their procedure by the GI Lab for time of arrival if their procedure is [place] and Pre-op for [place] cases. Arrival times should be called by 5 pm. If no phone is received the patient should contact their respective hospital. The GI lab telephone number is [phone] and ES Pre-op is [phone].     Follow diet and prep instructions per office including NPO status.  If patient has NOT received instructions from office patient is advised to call surgeon office, verbalizes understanding.    Bath or shower the night before and the am of surgery with non-mositurizing soap. No lotions, oils, powders, cologne on skin. No make up, eye make up or jewelry. Wear loose fitting comfortable, clean clothing.     Must have adult present in building the entire time .     Medications for the day of procedure Verapamil.    The following discharge instructions reviewed with patient: medication given during procedure may cause drowsiness for several hours, therefore, do not drive or operate machinery for remainder of the day. You may not drink alcohol on the day of your procedure, please resume regular diet and activity unless otherwise directed. You may experience abdominal distention for several hours that is relieved by the passage of gas. Contact your physician if you have any of the following: fever or chills, severe abdominal pain or excessive amount of bleeding or a large amount when having a bowel movement. Occasional specks of blood with bowel movement would not be unusual.

## 2024-05-28 ENCOUNTER — ANESTHESIA (OUTPATIENT)
Dept: ENDOSCOPY | Age: 59
End: 2024-05-28
Payer: COMMERCIAL

## 2024-05-28 ENCOUNTER — HOSPITAL ENCOUNTER (OUTPATIENT)
Age: 59
Setting detail: OUTPATIENT SURGERY
Discharge: HOME | End: 2024-05-28
Attending: SURGERY | Admitting: SURGERY
Payer: COMMERCIAL

## 2024-05-28 ENCOUNTER — ANESTHESIA EVENT (OUTPATIENT)
Dept: ENDOSCOPY | Age: 59
End: 2024-05-28
Payer: COMMERCIAL

## 2024-05-28 VITALS
TEMPERATURE: 98 F | HEART RATE: 78 BPM | HEIGHT: 64 IN | WEIGHT: 180.56 LBS | DIASTOLIC BLOOD PRESSURE: 60 MMHG | OXYGEN SATURATION: 99 % | SYSTOLIC BLOOD PRESSURE: 117 MMHG | RESPIRATION RATE: 14 BRPM | BODY MASS INDEX: 30.83 KG/M2

## 2024-05-28 PROCEDURE — 2709999900 HC NON-CHARGEABLE SUPPLY: Performed by: SURGERY

## 2024-05-28 PROCEDURE — 45380 COLONOSCOPY AND BIOPSY: CPT | Performed by: SURGERY

## 2024-05-28 PROCEDURE — 88305 TISSUE EXAM BY PATHOLOGIST: CPT

## 2024-05-28 PROCEDURE — 3700000000 HC ANESTHESIA ATTENDED CARE: Performed by: SURGERY

## 2024-05-28 PROCEDURE — 3700000001 HC ADD 15 MINUTES (ANESTHESIA): Performed by: SURGERY

## 2024-05-28 PROCEDURE — 2500000003 HC RX 250 WO HCPCS: Performed by: REGISTERED NURSE

## 2024-05-28 PROCEDURE — 2580000003 HC RX 258: Performed by: ANESTHESIOLOGY

## 2024-05-28 PROCEDURE — 7100000011 HC PHASE II RECOVERY - ADDTL 15 MIN: Performed by: SURGERY

## 2024-05-28 PROCEDURE — 2580000003 HC RX 258: Performed by: REGISTERED NURSE

## 2024-05-28 PROCEDURE — 7100000010 HC PHASE II RECOVERY - FIRST 15 MIN: Performed by: SURGERY

## 2024-05-28 PROCEDURE — 6360000002 HC RX W HCPCS: Performed by: REGISTERED NURSE

## 2024-05-28 PROCEDURE — 3609010400 HC COLONOSCOPY POLYPECTOMY HOT BIOPSY: Performed by: SURGERY

## 2024-05-28 RX ORDER — SODIUM CHLORIDE 0.9 % (FLUSH) 0.9 %
5-40 SYRINGE (ML) INJECTION EVERY 12 HOURS SCHEDULED
Status: DISCONTINUED | OUTPATIENT
Start: 2024-05-28 | End: 2024-05-28 | Stop reason: HOSPADM

## 2024-05-28 RX ORDER — SODIUM CHLORIDE 9 MG/ML
INJECTION, SOLUTION INTRAVENOUS PRN
Status: DISCONTINUED | OUTPATIENT
Start: 2024-05-28 | End: 2024-05-28 | Stop reason: SDUPTHER

## 2024-05-28 RX ORDER — LIDOCAINE HYDROCHLORIDE 20 MG/ML
INJECTION, SOLUTION EPIDURAL; INFILTRATION; INTRACAUDAL; PERINEURAL PRN
Status: DISCONTINUED | OUTPATIENT
Start: 2024-05-28 | End: 2024-05-28 | Stop reason: SDUPTHER

## 2024-05-28 RX ORDER — SODIUM CHLORIDE 0.9 % (FLUSH) 0.9 %
5-40 SYRINGE (ML) INJECTION EVERY 12 HOURS SCHEDULED
Status: DISCONTINUED | OUTPATIENT
Start: 2024-05-28 | End: 2024-05-28 | Stop reason: SDUPTHER

## 2024-05-28 RX ORDER — SODIUM CHLORIDE, SODIUM LACTATE, POTASSIUM CHLORIDE, CALCIUM CHLORIDE 600; 310; 30; 20 MG/100ML; MG/100ML; MG/100ML; MG/100ML
INJECTION, SOLUTION INTRAVENOUS CONTINUOUS
Status: DISCONTINUED | OUTPATIENT
Start: 2024-05-28 | End: 2024-05-28 | Stop reason: HOSPADM

## 2024-05-28 RX ORDER — SODIUM CHLORIDE 0.9 % (FLUSH) 0.9 %
5-40 SYRINGE (ML) INJECTION PRN
Status: DISCONTINUED | OUTPATIENT
Start: 2024-05-28 | End: 2024-05-28 | Stop reason: SDUPTHER

## 2024-05-28 RX ORDER — SODIUM CHLORIDE 0.9 % (FLUSH) 0.9 %
5-40 SYRINGE (ML) INJECTION PRN
Status: DISCONTINUED | OUTPATIENT
Start: 2024-05-28 | End: 2024-05-28 | Stop reason: HOSPADM

## 2024-05-28 RX ORDER — SODIUM CHLORIDE 9 MG/ML
INJECTION, SOLUTION INTRAVENOUS PRN
Status: DISCONTINUED | OUTPATIENT
Start: 2024-05-28 | End: 2024-05-28 | Stop reason: HOSPADM

## 2024-05-28 RX ORDER — NALOXONE HYDROCHLORIDE 0.4 MG/ML
INJECTION, SOLUTION INTRAMUSCULAR; INTRAVENOUS; SUBCUTANEOUS PRN
Status: DISCONTINUED | OUTPATIENT
Start: 2024-05-28 | End: 2024-05-28 | Stop reason: HOSPADM

## 2024-05-28 RX ORDER — LIDOCAINE HYDROCHLORIDE 10 MG/ML
1 INJECTION, SOLUTION INFILTRATION; PERINEURAL
Status: DISCONTINUED | OUTPATIENT
Start: 2024-05-28 | End: 2024-05-28 | Stop reason: HOSPADM

## 2024-05-28 RX ORDER — SODIUM CHLORIDE, SODIUM LACTATE, POTASSIUM CHLORIDE, CALCIUM CHLORIDE 600; 310; 30; 20 MG/100ML; MG/100ML; MG/100ML; MG/100ML
INJECTION, SOLUTION INTRAVENOUS CONTINUOUS PRN
Status: DISCONTINUED | OUTPATIENT
Start: 2024-05-28 | End: 2024-05-28 | Stop reason: SDUPTHER

## 2024-05-28 RX ORDER — EPHEDRINE SULFATE/0.9% NACL/PF 50 MG/5 ML
SYRINGE (ML) INTRAVENOUS PRN
Status: DISCONTINUED | OUTPATIENT
Start: 2024-05-28 | End: 2024-05-28 | Stop reason: SDUPTHER

## 2024-05-28 RX ORDER — PROPOFOL 10 MG/ML
INJECTION, EMULSION INTRAVENOUS PRN
Status: DISCONTINUED | OUTPATIENT
Start: 2024-05-28 | End: 2024-05-28 | Stop reason: SDUPTHER

## 2024-05-28 RX ORDER — GLYCOPYRROLATE 0.2 MG/ML
INJECTION INTRAMUSCULAR; INTRAVENOUS PRN
Status: DISCONTINUED | OUTPATIENT
Start: 2024-05-28 | End: 2024-05-28 | Stop reason: SDUPTHER

## 2024-05-28 RX ADMIN — PROPOFOL 180 MCG/KG/MIN: 10 INJECTION, EMULSION INTRAVENOUS at 09:42

## 2024-05-28 RX ADMIN — Medication 5 MG: at 10:03

## 2024-05-28 RX ADMIN — SODIUM CHLORIDE, SODIUM LACTATE, POTASSIUM CHLORIDE, AND CALCIUM CHLORIDE: 600; 310; 30; 20 INJECTION, SOLUTION INTRAVENOUS at 09:38

## 2024-05-28 RX ADMIN — Medication 10 MG: at 09:59

## 2024-05-28 RX ADMIN — PROPOFOL 100 MG: 10 INJECTION, EMULSION INTRAVENOUS at 09:41

## 2024-05-28 RX ADMIN — LIDOCAINE HYDROCHLORIDE 30 MG: 20 INJECTION, SOLUTION EPIDURAL; INFILTRATION; INTRACAUDAL; PERINEURAL at 09:41

## 2024-05-28 RX ADMIN — Medication 10 MG: at 09:56

## 2024-05-28 RX ADMIN — GLYCOPYRROLATE 0.2 MG: 0.2 INJECTION INTRAMUSCULAR; INTRAVENOUS at 09:50

## 2024-05-28 RX ADMIN — Medication 15 MG: at 10:05

## 2024-05-28 RX ADMIN — SODIUM CHLORIDE, POTASSIUM CHLORIDE, SODIUM LACTATE AND CALCIUM CHLORIDE: 600; 310; 30; 20 INJECTION, SOLUTION INTRAVENOUS at 09:03

## 2024-05-28 ASSESSMENT — PAIN - FUNCTIONAL ASSESSMENT: PAIN_FUNCTIONAL_ASSESSMENT: 0-10

## 2024-05-28 NOTE — DISCHARGE INSTRUCTIONS
Gastrointestinal Colonoscopy/Flexible Sigmoidoscopy - Lower Exam Discharge Instructions  Call Dr. Heller at [phone] for any problems or questions.    Contact the doctor’s office for follow up appointment as directed    Medication may cause drowsiness for several hours, therefore:  Do not drive or operate machinery for reminder of the day.  No alcohol today.  Do not make any important or legal decisions for 24 hours.  Do not sign any legal documents for 24 hours.    Ordinarily, you may resume regular diet and activity after exam unless otherwise specified by your physician.    Because of air put into your colon during exam, you may experience some abdominal distension, relieved by the passage of gas, for several hours.    Contact your physician if you have any of the following:  Excessive amount of bleeding - large amount when having a bowel movement.  Occasional specks of blood with bowel movement would not be unusual.  Severe abdominal pain  Fever or Chills    Polyp Removal - follow these additional instructions  Take Metamucil - 1 tablespoon in juice every morning for 3 days  No Aspirin, Advil, Aleve, Nuprin, Ibuprofen, or medications that contain these drugs for 2 weeks.      Any additional instructions:    Await pathology  Call office to schedule follow up appointment for 2-3 weeks to discuss pathology results   Follow up with PCP on a normal schedule     0 = independent

## 2024-05-28 NOTE — ANESTHESIA PRE PROCEDURE
Department of Anesthesiology  Preprocedure Note       Name:  Yeni Yepez   Age:  58 y.o.  :  1965                                          MRN:  [id number]         Date:  2024      Surgeon: Surgeon(s):  Bobby Heller MD    Procedure: Procedure(s):  COLORECTAL CANCER SCREENING, NOT HIGH RISK    Medications prior to admission:   Prior to Admission medications    Medication Sig Start Date End Date Taking? Authorizing Provider   Loratadine (CLARITIN PO) Take by mouth   Yes Danni Campbell MD   methylPREDNISolone (MEDROL DOSEPACK) 4 MG tablet TAKE 6 TABLETS ON DAY 1 AS DIRECTED ON PACKAGE AND DECREASE BY 1 TAB EACH DAY FOR A TOTAL OF 6 DAYS 23   Danni Campbell MD   albuterol sulfate HFA (PROVENTIL;VENTOLIN;PROAIR) 108 (90 Base) MCG/ACT inhaler INHALE 1 PUFF BY MOUTH EVERY 6 HOURS AS NEEDED FOR WHEEZING 1/3/24   Francine Arriaga MD   montelukast (SINGULAIR) 10 MG tablet TAKE 1 TABLET BY MOUTH ONCE DAILY FOR ALLERGIES AND FOR ASTHMA 23   Francine Arriaga MD   escitalopram (LEXAPRO) 5 MG tablet Take 1 tablet by mouth daily 23   Francine Arriaga MD   buPROPion (WELLBUTRIN XL) 150 MG extended release tablet TAKE 1 TABLET BY MOUTH ONCE DAILY IN THE MORNING  Patient taking differently: every morning TAKE 1 TABLET BY MOUTH ONCE DAILY IN THE MORNING 23   Francine Arriaga MD   clotrimazole-betamethasone (LOTRISONE) 1-0.05 % cream Apply topically 2 times daily. 23   Francine Arriaga MD   rosuvastatin (CRESTOR) 10 MG tablet Take 1 tablet by mouth once daily 23   Abimael Malone MD   olopatadine (PATANASE) 0.6 % SOLN nassl soln 2 sprays by Nasal route 2 times daily 23   Francine Arriaga MD   fluticasone (FLONASE) 50 MCG/ACT nasal spray 2 sprays by Each Nostril route daily 23   Francine Arriaga MD   flecainide (TAMBOCOR) 100 MG tablet Take 1 tablet by mouth 2 times daily  Patient taking differently: Take 1 tablet by mouth at bedtime 23   Abimael Malone

## 2024-05-28 NOTE — H&P
GENERAL SURGERY HISTORY AND PHYSICAL    PRIMARY CARE PROVIDER:Francine Arriaga MD    Date of visit: 05/28/24    CHIEF COMPLAINT: Screening Colonoscopy      HISTORY OF PRESENT ILLNESS: Yeni Yepez is a 58 y.o. female who presents for colonoscopy via direct scheduling.  Patient was given a 1 day prep.  The patient is not on anticoagulation.    Prior colonoscopies: 2013, no polyps or abnormalities    FH: negative for colon cancer, colon polyps, GI cancers, IBD    Prior Abdominal Surgery: Lap torrey, epigastric hernia repair  Cardiac History: HTN, HLD, CAD s/p LHC 2020, PVC s/p ablation 2022  Pulmonary History: BOSTON  Blood Thinners: None      PAST MEDICAL HISTORY:    Past Medical History:   Diagnosis Date    Adverse effect of anesthesia     states woke up several times during colonoscopy    Anxiety     managing with medication    CAD (coronary artery disease)     follows with Cardiology    COVID-19 12/2020    not hospitalized reports symptoms of cough, SOB, fever,chills, fatigue, weakness, HA, loss of taste and smell    Depression     Gallstones 12/14/2012    GERD (gastroesophageal reflux disease)     History of DVT (deep vein thrombosis)     History of pulmonary embolus (PE)     Hyperlipidemia     Hypertension     controlled with meds    Incisional hernia 07/28/2022    Robotic incarcerated incisional Herniorrhaphy with Mesh. Jennifer Jacobs,  10/7/22    BOSTON (obstructive sleep apnea)     Mouth piece    PONV (postoperative nausea and vomiting)     PVC (premature ventricular contraction)     Thromboembolus (HCC)     PE & DVT- last was in 2022       PAST SURGICAL HISTORY:    Past Surgical History:   Procedure Laterality Date    BREAST LUMPECTOMY Left 1994    benign    CARDIAC CATHETERIZATION      CATARACT EXTRACTION, BILATERAL Bilateral 05/2023    CHOLECYSTECTOMY, LAPAROSCOPIC      COLONOSCOPY      GYN      laparoscopies for endometriosis- x 4    HERNIA REPAIR N/A 10/07/2022    HERNIA EPIGASTRIC REPAIR

## 2024-05-28 NOTE — OP NOTE
[place] [address]  [phone]    Colonoscopy Procedure Note    Name: Yeni Yepez     Date: 5/28/2024   Med Record Number: [id number]   Age: 58 y.o.   Sex: female   Procedure: Colonoscopy --screening  Pre-operative Diagnosis:  Screening  Post-operative Diagnosis: Same  Indications: screening for colon cancer  Anesthesia/Sedation: MAC IV MAC anesthesia Propofol  Procedure Details:    Informed consent was obtained for the procedure, including sedation.  Risks of perforation, hemorrhage, adverse drug reaction and aspiration were discussed. The patient was placed in the left lateral decubitus position.  Based on the pre-procedure assessment, including review of the patient's medical history, medications, allergies, and review of systems, she had been deemed to be an appropriate candidate for sedation by the Anesthesia Dept.   The patient was monitored continuously with ECG tracing, pulse oximetry, blood pressure monitoring, and direct observations.      A time out was performed.  Once sedation was adequate, a rectal examination was performed.  The [id number] was inserted into the rectum and advanced under direct vision to the cecum, which was identified by the ileocecal valve and appendiceal orifice.  The quality of the colonic preparation was excellent.  A careful inspection was made as the colonoscope was withdrawn, including a retroflexed view of the rectum; findings and interventions are described below.  Appropriate photodocumentation was obtained.    Findings:   ANUS: Anal exam reveals no masses or hemorrhoids, sphincter tone is normal.   RECTUM: Rectal polyp, sessile, 4mm removed by cold biopsy forceps.  Rectal exam otherwise reveals no masses or hemorrhoids.   SIGMOID COLON: The mucosa is normal with good vascular pattern and without ulcers, diverticula, and polyps.   DESCENDING COLON: The mucosa is normal with good vascular pattern and without

## 2024-05-28 NOTE — ANESTHESIA POSTPROCEDURE EVALUATION
Department of Anesthesiology  Postprocedure Note    Patient: Yeni Yepez  MRN: [id number]  YOB: 1965  Date of evaluation: 5/28/2024    Procedure Summary       Date: 05/28/24 [place] [facility] [place] [facility]    Anesthesia Start: 0938 Anesthesia Stop: 1006    Procedure: COLONOSCOPY POLYPECTOMY  BIOPSY Diagnosis:       Special screening for malignant neoplasms, colon      (Special screening for malignant neoplasms, colon [Z12.11])    Surgeons: Bobby Heller MD Responsible Provider: Simon De La Rosa MD    Anesthesia Type: TIVA ASA Status: 3            Anesthesia Type: No value filed.    Jose Phase I:      Jose Phase II: Jose Score: 10    Anesthesia Post Evaluation    Patient location during evaluation: PACU  Patient participation: complete - patient participated  Level of consciousness: awake and alert  Airway patency: patent  Nausea & Vomiting: no nausea and no vomiting  Cardiovascular status: hemodynamically stable  Respiratory status: acceptable, nonlabored ventilation and spontaneous ventilation  Hydration status: euvolemic  Comments: /60   Pulse 78   Temp 98 °F (36.7 °C) (Temporal)   Resp 14   Ht 1.626 m (5' 4.02\")   Wt 81.9 kg (180 lb 8.9 oz)   SpO2 99%   BMI 30.98 kg/m²     Multimodal analgesia pain management approach  Pain management: adequate and satisfactory to patient    No notable events documented.

## 2024-06-16 PROBLEM — Z12.11 SPECIAL SCREENING FOR MALIGNANT NEOPLASMS, COLON: Status: RESOLVED | Noted: 2024-04-09 | Resolved: 2024-06-16

## 2024-07-30 ENCOUNTER — OFFICE VISIT (OUTPATIENT)
Age: 59
End: 2024-07-30
Payer: COMMERCIAL

## 2024-07-30 VITALS
SYSTOLIC BLOOD PRESSURE: 138 MMHG | BODY MASS INDEX: 30.22 KG/M2 | HEART RATE: 64 BPM | WEIGHT: 177 LBS | HEIGHT: 64 IN | DIASTOLIC BLOOD PRESSURE: 88 MMHG

## 2024-07-30 DIAGNOSIS — I10 PRIMARY HYPERTENSION: Primary | ICD-10-CM

## 2024-07-30 DIAGNOSIS — I49.3 PVC (PREMATURE VENTRICULAR CONTRACTION): ICD-10-CM

## 2024-07-30 DIAGNOSIS — E78.5 HYPERLIPIDEMIA LDL GOAL <70: ICD-10-CM

## 2024-07-30 PROCEDURE — 93000 ELECTROCARDIOGRAM COMPLETE: CPT | Performed by: INTERNAL MEDICINE

## 2024-07-30 PROCEDURE — 3075F SYST BP GE 130 - 139MM HG: CPT | Performed by: INTERNAL MEDICINE

## 2024-07-30 PROCEDURE — 99214 OFFICE O/P EST MOD 30 MIN: CPT | Performed by: INTERNAL MEDICINE

## 2024-07-30 PROCEDURE — 3079F DIAST BP 80-89 MM HG: CPT | Performed by: INTERNAL MEDICINE

## 2024-07-30 RX ORDER — LOSARTAN POTASSIUM 25 MG/1
TABLET ORAL
Qty: 90 TABLET | Refills: 3 | Status: SHIPPED | OUTPATIENT
Start: 2024-07-30

## 2024-07-30 RX ORDER — FLECAINIDE ACETATE 100 MG/1
100 TABLET ORAL 2 TIMES DAILY
Qty: 180 TABLET | Refills: 3 | Status: SHIPPED | OUTPATIENT
Start: 2024-07-30

## 2024-07-30 ASSESSMENT — ENCOUNTER SYMPTOMS
NAIL CHANGES: 0
STRIDOR: 0
ABDOMINAL PAIN: 0
EYE PAIN: 0
APHONIA: 0
COUGH: 0

## 2024-10-23 DIAGNOSIS — F41.1 GAD (GENERALIZED ANXIETY DISORDER): ICD-10-CM

## 2024-10-23 RX ORDER — BUPROPION HYDROCHLORIDE 150 MG/1
TABLET ORAL
Qty: 90 TABLET | Refills: 0 | Status: SHIPPED | OUTPATIENT
Start: 2024-10-23

## 2024-10-23 NOTE — TELEPHONE ENCOUNTER
Needs Appointment.  Please schedule patient for appointment.     Prescription (s) refilled  It (they) has been escribed to the pharmacy.    Requested Prescriptions     Signed Prescriptions Disp Refills    buPROPion (WELLBUTRIN XL) 150 MG extended release tablet 90 tablet 0     Sig: TAKE 1 TABLET BY MOUTH ONCE DAILY IN THE MORNING     Authorizing Provider: JOSH ROCKWELL     No orders of the defined types were placed in this encounter.          ICD-10-CM    1. JOSH (generalized anxiety disorder)  F41.1 buPROPion (WELLBUTRIN XL) 150 MG extended release tablet

## 2025-01-13 DIAGNOSIS — F41.1 GAD (GENERALIZED ANXIETY DISORDER): ICD-10-CM

## 2025-01-13 DIAGNOSIS — J30.1 NON-SEASONAL ALLERGIC RHINITIS DUE TO POLLEN: ICD-10-CM

## 2025-01-13 RX ORDER — MONTELUKAST SODIUM 10 MG/1
TABLET ORAL
Qty: 90 TABLET | Refills: 0 | Status: SHIPPED | OUTPATIENT
Start: 2025-01-13

## 2025-01-13 RX ORDER — ESCITALOPRAM OXALATE 5 MG/1
5 TABLET ORAL DAILY
Qty: 90 TABLET | Refills: 0 | Status: SHIPPED | OUTPATIENT
Start: 2025-01-13

## 2025-01-13 NOTE — TELEPHONE ENCOUNTER
Needs Appointment.  Please schedule patient for appointment.   Prescription (s) refilled  It (they) has been escribed to the pharmacy.    Requested Prescriptions     Signed Prescriptions Disp Refills    escitalopram (LEXAPRO) 5 MG tablet 90 tablet 0     Sig: Take 1 tablet by mouth once daily     Authorizing Provider: JOSH ROCKWELL    montelukast (SINGULAIR) 10 MG tablet 90 tablet 0     Sig: TAKE 1 TABLET BY MOUTH ONCE DAILY FOR ALLERGIES AND FOR ASTHMA     Authorizing Provider: JOSH ROCKWELL     No orders of the defined types were placed in this encounter.          ICD-10-CM    1. JOSH (generalized anxiety disorder)  F41.1 escitalopram (LEXAPRO) 5 MG tablet      2. Non-seasonal allergic rhinitis due to pollen  J30.1 montelukast (SINGULAIR) 10 MG tablet

## 2025-01-31 ENCOUNTER — OFFICE VISIT (OUTPATIENT)
Dept: FAMILY MEDICINE CLINIC | Facility: CLINIC | Age: 60
End: 2025-01-31
Payer: COMMERCIAL

## 2025-01-31 VITALS
RESPIRATION RATE: 17 BRPM | OXYGEN SATURATION: 96 % | BODY MASS INDEX: 32.47 KG/M2 | HEART RATE: 73 BPM | SYSTOLIC BLOOD PRESSURE: 151 MMHG | DIASTOLIC BLOOD PRESSURE: 91 MMHG | WEIGHT: 190.2 LBS | HEIGHT: 64 IN | TEMPERATURE: 97.9 F

## 2025-01-31 DIAGNOSIS — F41.1 GAD (GENERALIZED ANXIETY DISORDER): ICD-10-CM

## 2025-01-31 DIAGNOSIS — R63.5 WEIGHT GAIN: ICD-10-CM

## 2025-01-31 DIAGNOSIS — J01.90 ACUTE BACTERIAL SINUSITIS: ICD-10-CM

## 2025-01-31 DIAGNOSIS — E78.5 HYPERLIPIDEMIA, UNSPECIFIED HYPERLIPIDEMIA TYPE: ICD-10-CM

## 2025-01-31 DIAGNOSIS — E78.5 DYSLIPIDEMIA: ICD-10-CM

## 2025-01-31 DIAGNOSIS — H69.93 ETD (EUSTACHIAN TUBE DYSFUNCTION), BILATERAL: ICD-10-CM

## 2025-01-31 DIAGNOSIS — F39 UNSPECIFIED MOOD (AFFECTIVE) DISORDER (HCC): ICD-10-CM

## 2025-01-31 DIAGNOSIS — R11.0 NAUSEA: ICD-10-CM

## 2025-01-31 DIAGNOSIS — Z12.31 ENCOUNTER FOR SCREENING MAMMOGRAM FOR BREAST CANCER: ICD-10-CM

## 2025-01-31 DIAGNOSIS — B37.31 CANDIDAL VAGINITIS: ICD-10-CM

## 2025-01-31 DIAGNOSIS — B96.89 ACUTE BACTERIAL SINUSITIS: ICD-10-CM

## 2025-01-31 DIAGNOSIS — I10 PRIMARY HYPERTENSION: Primary | ICD-10-CM

## 2025-01-31 DIAGNOSIS — J30.1 NON-SEASONAL ALLERGIC RHINITIS DUE TO POLLEN: ICD-10-CM

## 2025-01-31 LAB
ALBUMIN SERPL-MCNC: 3.8 G/DL (ref 3.5–5)
ALBUMIN/GLOB SERPL: 1.1 (ref 1–1.9)
ALP SERPL-CCNC: 103 U/L (ref 35–104)
ALT SERPL-CCNC: 43 U/L (ref 8–45)
ANION GAP SERPL CALC-SCNC: 12 MMOL/L (ref 7–16)
AST SERPL-CCNC: 37 U/L (ref 15–37)
BASOPHILS # BLD: 0.06 K/UL (ref 0–0.2)
BASOPHILS NFR BLD: 0.7 % (ref 0–2)
BILIRUB SERPL-MCNC: 0.3 MG/DL (ref 0–1.2)
BUN SERPL-MCNC: 15 MG/DL (ref 6–23)
CALCIUM SERPL-MCNC: 9.6 MG/DL (ref 8.8–10.2)
CHLORIDE SERPL-SCNC: 106 MMOL/L (ref 98–107)
CHOLEST SERPL-MCNC: 164 MG/DL (ref 0–200)
CO2 SERPL-SCNC: 25 MMOL/L (ref 20–29)
CREAT SERPL-MCNC: 0.95 MG/DL (ref 0.6–1.1)
CREAT UR-MCNC: 64.5 MG/DL (ref 28–217)
DIFFERENTIAL METHOD BLD: NORMAL
EOSINOPHIL # BLD: 0.22 K/UL (ref 0–0.8)
EOSINOPHIL NFR BLD: 2.7 % (ref 0.5–7.8)
ERYTHROCYTE [DISTWIDTH] IN BLOOD BY AUTOMATED COUNT: 13.2 % (ref 11.9–14.6)
GLOBULIN SER CALC-MCNC: 3.6 G/DL (ref 2.3–3.5)
GLUCOSE SERPL-MCNC: 83 MG/DL (ref 70–99)
HCT VFR BLD AUTO: 43.1 % (ref 35.8–46.3)
HDLC SERPL-MCNC: 73 MG/DL (ref 40–60)
HDLC SERPL: 2.2 (ref 0–5)
HGB BLD-MCNC: 14.6 G/DL (ref 11.7–15.4)
IMM GRANULOCYTES # BLD AUTO: 0.02 K/UL (ref 0–0.5)
IMM GRANULOCYTES NFR BLD AUTO: 0.2 % (ref 0–5)
LDLC SERPL CALC-MCNC: 65 MG/DL (ref 0–100)
LYMPHOCYTES # BLD: 2.07 K/UL (ref 0.5–4.6)
LYMPHOCYTES NFR BLD: 25.5 % (ref 13–44)
MCH RBC QN AUTO: 31.7 PG (ref 26.1–32.9)
MCHC RBC AUTO-ENTMCNC: 33.9 G/DL (ref 31.4–35)
MCV RBC AUTO: 93.7 FL (ref 82–102)
MICROALBUMIN UR-MCNC: <1.2 MG/DL (ref 0–20)
MICROALBUMIN/CREAT UR-RTO: NORMAL MG/G (ref 0–30)
MONOCYTES # BLD: 0.69 K/UL (ref 0.1–1.3)
MONOCYTES NFR BLD: 8.5 % (ref 4–12)
NEUTS SEG # BLD: 5.06 K/UL (ref 1.7–8.2)
NEUTS SEG NFR BLD: 62.4 % (ref 43–78)
NRBC # BLD: 0 K/UL (ref 0–0.2)
PLATELET # BLD AUTO: 323 K/UL (ref 150–450)
PMV BLD AUTO: 10.2 FL (ref 9.4–12.3)
POTASSIUM SERPL-SCNC: 4.1 MMOL/L (ref 3.5–5.1)
PROT SERPL-MCNC: 7.3 G/DL (ref 6.3–8.2)
RBC # BLD AUTO: 4.6 M/UL (ref 4.05–5.2)
SODIUM SERPL-SCNC: 143 MMOL/L (ref 136–145)
TRIGL SERPL-MCNC: 129 MG/DL (ref 0–150)
TSH, 3RD GENERATION: 0.84 UIU/ML (ref 0.27–4.2)
VLDLC SERPL CALC-MCNC: 26 MG/DL (ref 6–23)
WBC # BLD AUTO: 8.1 K/UL (ref 4.3–11.1)

## 2025-01-31 PROCEDURE — 3077F SYST BP >= 140 MM HG: CPT | Performed by: FAMILY MEDICINE

## 2025-01-31 PROCEDURE — 3080F DIAST BP >= 90 MM HG: CPT | Performed by: FAMILY MEDICINE

## 2025-01-31 PROCEDURE — 99214 OFFICE O/P EST MOD 30 MIN: CPT | Performed by: FAMILY MEDICINE

## 2025-01-31 RX ORDER — MONTELUKAST SODIUM 10 MG/1
TABLET ORAL
Qty: 90 TABLET | Refills: 2 | Status: SHIPPED | OUTPATIENT
Start: 2025-01-31

## 2025-01-31 RX ORDER — BUPROPION HYDROCHLORIDE 150 MG/1
TABLET ORAL
Qty: 90 TABLET | Refills: 2 | Status: SHIPPED | OUTPATIENT
Start: 2025-01-31

## 2025-01-31 RX ORDER — TERCONAZOLE 4 MG/G
CREAM VAGINAL
Qty: 1 EACH | Refills: 5 | Status: SHIPPED | OUTPATIENT
Start: 2025-01-31 | End: 2025-02-07

## 2025-01-31 RX ORDER — ROSUVASTATIN CALCIUM 10 MG/1
10 TABLET, COATED ORAL DAILY
Qty: 90 TABLET | Refills: 3 | Status: SHIPPED | OUTPATIENT
Start: 2025-01-31

## 2025-01-31 RX ORDER — DOXYCYCLINE 100 MG/1
100 CAPSULE ORAL DAILY
COMMUNITY
Start: 2024-10-24

## 2025-01-31 RX ORDER — CEFUROXIME AXETIL 500 MG/1
500 TABLET ORAL 2 TIMES DAILY
Qty: 20 TABLET | Refills: 0 | Status: SHIPPED | OUTPATIENT
Start: 2025-01-31 | End: 2025-02-10

## 2025-01-31 RX ORDER — ONDANSETRON 4 MG/1
4 TABLET, FILM COATED ORAL 3 TIMES DAILY PRN
Qty: 30 TABLET | Refills: 0 | Status: SHIPPED | OUTPATIENT
Start: 2025-01-31

## 2025-01-31 RX ORDER — ESCITALOPRAM OXALATE 5 MG/1
5 TABLET ORAL DAILY
Qty: 90 TABLET | Refills: 2 | Status: SHIPPED | OUTPATIENT
Start: 2025-01-31

## 2025-01-31 RX ORDER — FLUTICASONE PROPIONATE 50 MCG
2 SPRAY, SUSPENSION (ML) NASAL DAILY
Qty: 48 G | Refills: 5 | Status: SHIPPED | OUTPATIENT
Start: 2025-01-31

## 2025-01-31 SDOH — ECONOMIC STABILITY: FOOD INSECURITY: WITHIN THE PAST 12 MONTHS, THE FOOD YOU BOUGHT JUST DIDN'T LAST AND YOU DIDN'T HAVE MONEY TO GET MORE.: NEVER TRUE

## 2025-01-31 SDOH — ECONOMIC STABILITY: FOOD INSECURITY: WITHIN THE PAST 12 MONTHS, YOU WORRIED THAT YOUR FOOD WOULD RUN OUT BEFORE YOU GOT MONEY TO BUY MORE.: NEVER TRUE

## 2025-01-31 ASSESSMENT — PATIENT HEALTH QUESTIONNAIRE - PHQ9
SUM OF ALL RESPONSES TO PHQ QUESTIONS 1-9: 0
2. FEELING DOWN, DEPRESSED OR HOPELESS: NOT AT ALL
SUM OF ALL RESPONSES TO PHQ9 QUESTIONS 1 & 2: 0
1. LITTLE INTEREST OR PLEASURE IN DOING THINGS: NOT AT ALL

## 2025-01-31 NOTE — PROGRESS NOTES
HISTORY OF PRESENT ILLNESS  Chief Complaint   Patient presents with    Hypertension    Congestion     Sinus congestion, cough     NOTICE FOR THE PATIENT: This clinical note is not designed to be interpreted by patients.  We do not recommend reading it unless you have medical training. These notes may contain candid and (unintentionally) offensive descriptions, which are sometimes required for accurate documentation. If you would like more information about your healthcare, please obtain it directly by myself or my staff/colleagues - never solely from the notes. Thank you for your understanding and cooperation.     Sinus congestion, cough production of thick sputum    Chronic fluid sensation in ears.  Using the nasal steroids and still there.  History of Present Illness  The patient presents for evaluation of sinus issues, hypertension, and medication management.    She reports experiencing mild congestion and a cough, which she attributes to a recent sinus infection. The onset of her symptoms was on Monday, characterized by a sore throat and general malaise, but she did go to work. Her condition improved on Tuesday and Wednesday, but she continues to experience a cough and nasal congestion. She is not blowing out anything but is coughing up thick green mucus. She does not report any fevers, chills, or body aches. She felt groggy on the first day of her illness but has since returned to work. She has been in close contact with her 4-year-old granddaughter, who frequently falls ill due to school exposure. She has not taken any over-the-counter cold medications. She also reports a persistent sensation of fluid in her ears, despite regular use of nasal sprays. She is interested in seeing an ENT specialist for this issue.    Her blood pressure readings have been consistently around 125, although today's reading was elevated. She attributes this to recent stress but can not identify a specific cause for the

## 2025-02-10 ENCOUNTER — OFFICE VISIT (OUTPATIENT)
Dept: ENT CLINIC | Age: 60
End: 2025-02-10

## 2025-02-10 VITALS
DIASTOLIC BLOOD PRESSURE: 86 MMHG | HEIGHT: 64 IN | BODY MASS INDEX: 32.1 KG/M2 | WEIGHT: 188 LBS | SYSTOLIC BLOOD PRESSURE: 136 MMHG

## 2025-02-10 DIAGNOSIS — H69.93 DYSFUNCTION OF BOTH EUSTACHIAN TUBES: Primary | Chronic | ICD-10-CM

## 2025-02-10 DIAGNOSIS — H61.21 IMPACTED CERUMEN OF RIGHT EAR: Chronic | ICD-10-CM

## 2025-02-10 RX ORDER — TERCONAZOLE 4 MG/G
CREAM VAGINAL
COMMUNITY
Start: 2025-02-04

## 2025-02-10 RX ORDER — FLUOCINOLONE ACETONIDE 0.11 MG/ML
5 OIL AURICULAR (OTIC) 2 TIMES DAILY
Qty: 20 ML | Refills: 3 | Status: SHIPPED | OUTPATIENT
Start: 2025-02-10

## 2025-02-10 ASSESSMENT — ENCOUNTER SYMPTOMS
RESPIRATORY NEGATIVE: 1
GASTROINTESTINAL NEGATIVE: 1
ALLERGIC/IMMUNOLOGIC NEGATIVE: 1
EYES NEGATIVE: 1

## 2025-02-10 NOTE — PROGRESS NOTES
was also instructed to use Flonase nasal spray consistently, starting a few weeks before the onset of spring, to manage potential allergies that could exacerbate her condition. If the itching persists despite these interventions, she should return for a follow-up consultation.    PROCEDURE  Cerumen was removed from the ears using a small vacuum .        The patient (or guardian, if applicable) and other individuals in attendance with the patient were advised that Artificial Intelligence will be utilized during this visit to record, process the conversation to generate a clinical note, and support improvement of the AI technology. The patient (or guardian, if applicable) and other individuals in attendance at the appointment consented to the use of AI, including the recording.            BETTIE Bernal

## 2025-07-31 ENCOUNTER — OFFICE VISIT (OUTPATIENT)
Dept: FAMILY MEDICINE CLINIC | Facility: CLINIC | Age: 60
End: 2025-07-31
Payer: COMMERCIAL

## 2025-07-31 ENCOUNTER — HOSPITAL ENCOUNTER (OUTPATIENT)
Dept: MAMMOGRAPHY | Age: 60
Discharge: HOME | End: 2025-07-31
Payer: COMMERCIAL

## 2025-07-31 VITALS
OXYGEN SATURATION: 97 % | HEIGHT: 64 IN | DIASTOLIC BLOOD PRESSURE: 81 MMHG | SYSTOLIC BLOOD PRESSURE: 124 MMHG | RESPIRATION RATE: 18 BRPM | BODY MASS INDEX: 33.68 KG/M2 | WEIGHT: 197.3 LBS | TEMPERATURE: 97.2 F | HEART RATE: 73 BPM

## 2025-07-31 DIAGNOSIS — J30.1 NON-SEASONAL ALLERGIC RHINITIS DUE TO POLLEN: ICD-10-CM

## 2025-07-31 DIAGNOSIS — F41.1 GAD (GENERALIZED ANXIETY DISORDER): ICD-10-CM

## 2025-07-31 DIAGNOSIS — R42 DIZZINESS: ICD-10-CM

## 2025-07-31 DIAGNOSIS — E66.811 CLASS 1 OBESITY DUE TO EXCESS CALORIES WITH SERIOUS COMORBIDITY AND BODY MASS INDEX (BMI) OF 33.0 TO 33.9 IN ADULT: ICD-10-CM

## 2025-07-31 DIAGNOSIS — Z12.31 SCREENING MAMMOGRAM FOR BREAST CANCER: ICD-10-CM

## 2025-07-31 DIAGNOSIS — Z86.711 HISTORY OF PULMONARY EMBOLUS (PE): ICD-10-CM

## 2025-07-31 DIAGNOSIS — R11.0 NAUSEA: ICD-10-CM

## 2025-07-31 DIAGNOSIS — E66.09 CLASS 1 OBESITY DUE TO EXCESS CALORIES WITH SERIOUS COMORBIDITY AND BODY MASS INDEX (BMI) OF 33.0 TO 33.9 IN ADULT: ICD-10-CM

## 2025-07-31 DIAGNOSIS — E78.5 HYPERLIPIDEMIA, UNSPECIFIED HYPERLIPIDEMIA TYPE: ICD-10-CM

## 2025-07-31 DIAGNOSIS — Z12.31 ENCOUNTER FOR SCREENING MAMMOGRAM FOR BREAST CANCER: ICD-10-CM

## 2025-07-31 DIAGNOSIS — B37.2 CANDIDAL DERMATITIS: ICD-10-CM

## 2025-07-31 DIAGNOSIS — I10 PRIMARY HYPERTENSION: Primary | ICD-10-CM

## 2025-07-31 PROBLEM — I82.431 ACUTE DEEP VEIN THROMBOSIS (DVT) OF POPLITEAL VEIN OF RIGHT LOWER EXTREMITY (HCC): Status: RESOLVED | Noted: 2017-11-14 | Resolved: 2025-07-31

## 2025-07-31 PROBLEM — I26.99 ACUTE PULMONARY EMBOLISM (HCC): Status: RESOLVED | Noted: 2017-10-28 | Resolved: 2025-07-31

## 2025-07-31 PROCEDURE — 3074F SYST BP LT 130 MM HG: CPT | Performed by: FAMILY MEDICINE

## 2025-07-31 PROCEDURE — 3079F DIAST BP 80-89 MM HG: CPT | Performed by: FAMILY MEDICINE

## 2025-07-31 PROCEDURE — 77063 BREAST TOMOSYNTHESIS BI: CPT

## 2025-07-31 PROCEDURE — 99214 OFFICE O/P EST MOD 30 MIN: CPT | Performed by: FAMILY MEDICINE

## 2025-07-31 RX ORDER — LOSARTAN POTASSIUM 50 MG/1
50 TABLET ORAL DAILY
Qty: 90 TABLET | Refills: 1 | Status: SHIPPED | OUTPATIENT
Start: 2025-07-31

## 2025-07-31 RX ORDER — MONTELUKAST SODIUM 10 MG/1
TABLET ORAL
Qty: 90 TABLET | Refills: 2 | Status: SHIPPED | OUTPATIENT
Start: 2025-07-31

## 2025-07-31 RX ORDER — ROSUVASTATIN CALCIUM 10 MG/1
10 TABLET, COATED ORAL DAILY
Qty: 90 TABLET | Refills: 3 | Status: SHIPPED | OUTPATIENT
Start: 2025-07-31

## 2025-07-31 RX ORDER — BUPROPION HYDROCHLORIDE 150 MG/1
TABLET ORAL
Qty: 90 TABLET | Refills: 2 | Status: SHIPPED | OUTPATIENT
Start: 2025-07-31

## 2025-07-31 RX ORDER — ONDANSETRON 4 MG/1
4 TABLET, FILM COATED ORAL 3 TIMES DAILY PRN
Qty: 30 TABLET | Refills: 3 | Status: SHIPPED | OUTPATIENT
Start: 2025-07-31

## 2025-07-31 RX ORDER — CLOTRIMAZOLE AND BETAMETHASONE DIPROPIONATE 10; .64 MG/G; MG/G
CREAM TOPICAL
Qty: 45 G | Refills: 0 | Status: SHIPPED | OUTPATIENT
Start: 2025-07-31

## 2025-07-31 RX ORDER — MECLIZINE HCL 12.5 MG 12.5 MG/1
12.5 TABLET ORAL 3 TIMES DAILY PRN
Qty: 30 TABLET | Refills: 2 | Status: SHIPPED | OUTPATIENT
Start: 2025-07-31 | End: 2025-08-30

## 2025-07-31 RX ORDER — ESCITALOPRAM OXALATE 5 MG/1
5 TABLET ORAL DAILY
Qty: 90 TABLET | Refills: 2 | Status: SHIPPED | OUTPATIENT
Start: 2025-07-31

## 2025-08-07 ASSESSMENT — ENCOUNTER SYMPTOMS
VOMITING: 0
WHEEZING: 0
NAUSEA: 0
ABDOMINAL PAIN: 0
CONSTIPATION: 0
DIARRHEA: 0
COUGH: 0
RHINORRHEA: 0

## (undated) DEVICE — GLOVE ORANGE PI 7   MSG9070

## (undated) DEVICE — SUTURE DEV SZ 2-0 WND CLSR ABSRB GS-22 VLOC COVIDIEN VLOCM2145

## (undated) DEVICE — DRAPE,TOP,102X53,STERILE: Brand: MEDLINE

## (undated) DEVICE — PINNACLE TIF INTRODUCER SHEATH: Brand: PINNACLE

## (undated) DEVICE — FORCEPS BX L240CM JAW DIA2.8MM L CAP W/ NDL MIC MESH TOOTH

## (undated) DEVICE — 18G NG KIT WITH 96IN PROBE COVER (10 PK): Brand: SITE-RITE

## (undated) DEVICE — CATHETER US GE COMPATIBILITY SOUNDSTAR ECO 10FR

## (undated) DEVICE — SYSTEM CLOSURE 6-12 FR VEN VASC VASCADE MVP

## (undated) DEVICE — GAUZE,SPONGE,4"X4",12PLY,WOVEN,NS,LF: Brand: MEDLINE

## (undated) DEVICE — TROCAR: Brand: KII FIOS FIRST ENTRY

## (undated) DEVICE — CANNULA SEAL: Brand: SINGLE-SITE

## (undated) DEVICE — CATHETER EP 7FR L115CM 2-8-2MM SPC TIP 2MM 10 ELECTRD FJ

## (undated) DEVICE — CATH EP MAP 2-6-2 7FR D CRV -- PENTARAY

## (undated) DEVICE — SYRINGE, LUER SLIP, STERILE, 60ML: Brand: MEDLINE

## (undated) DEVICE — CANNULA NSL ORAL AD FOR CAPNOFLEX CO2 O2 AIRLFE

## (undated) DEVICE — MASTISOL ADHESIVE LIQ 2/3ML

## (undated) DEVICE — CATH BIDIR JCRV 8F 3.5X115MM -- THERMOCOOL SF

## (undated) DEVICE — ARM DRAPE

## (undated) DEVICE — YANKAUER,BULB TIP,W/O VENT,RIGID,STERILE: Brand: MEDLINE

## (undated) DEVICE — SUTURE MCRYL SZ 3-0 L18IN ABSRB UD L19MM PS-2 3/8 CIR PRIM Y497G

## (undated) DEVICE — BLADELESS OBTURATOR: Brand: WECK VISTA

## (undated) DEVICE — SYRINGE MED 10ML LUERLOCK TIP W/O SFTY DISP

## (undated) DEVICE — CONTAINER FORMALIN PREFILLED 10% NBF 60ML

## (undated) DEVICE — TUBE SET IRR PUMP THERMALCOOL -- SMARTABLATE

## (undated) DEVICE — ELECTRO LUBE IS A SINGLE PATIENT USE DEVICE THAT IS INTENDED TO BE USED ON ELECTROSURGICAL ELECTRODES TO REDUCE STICKING.: Brand: KEY SURGICAL ELECTRO LUBE

## (undated) DEVICE — [HIGH FLOW INSUFFLATOR,  DO NOT USE IF PACKAGE IS DAMAGED,  KEEP DRY,  KEEP AWAY FROM SUNLIGHT,  PROTECT FROM HEAT AND RADIOACTIVE SOURCES.]: Brand: PNEUMOSURE

## (undated) DEVICE — PATCH CARTO 3 EXT REF --

## (undated) DEVICE — KENDALL RADIOLUCENT FOAM MONITORING ELECTRODE RECTANGULAR SHAPE: Brand: KENDALL

## (undated) DEVICE — GLOVE SURG SZ 7 L12IN FNGR THK79MIL GRN LTX FREE

## (undated) DEVICE — SINGLE PORT MANIFOLD: Brand: NEPTUNE 2

## (undated) DEVICE — SOLUTION IRRIG 1000ML 09% SOD CHL USP PIC PLAS CONTAINER

## (undated) DEVICE — COLUMN DRAPE

## (undated) DEVICE — PAD PT POS 36 IN SURGYPAD DISP

## (undated) DEVICE — SHEATH INTRO CANN L11CM DIA4FR 0.035IN SLIX VLV FLX DIL ROT

## (undated) DEVICE — SUTURE V-LOC 180 SZ 0 L9IN ABSRB GRN GS-21 L37MM 1/2 CIR VLOCL0346

## (undated) DEVICE — ENDOSCOPIC KIT 1.1+ OP4 CA DE 2 GWN AAMI LEVEL 3

## (undated) DEVICE — GENERAL LAPAROSCOPY: Brand: MEDLINE INDUSTRIES, INC.

## (undated) DEVICE — STRIP,CLOSURE,WOUND,MEDI-STRIP,1/2X4: Brand: MEDLINE

## (undated) DEVICE — PERCLOSE™ PROSTYLE™ SUTURE-MEDIATED CLOSURE AND REPAIR SYSTEM: Brand: PERCLOSE™ PROSTYLE™

## (undated) DEVICE — AIRLIFE™ OXYGEN TUBING 7 FEET (2.1 M) CRUSH RESISTANT OXYGEN TUBING, VINYL TIPPED: Brand: AIRLIFE™

## (undated) DEVICE — PINNACLE INTRODUCER SHEATH: Brand: PINNACLE

## (undated) DEVICE — CATHETER ABLAT 8FR L115CM 1-6-2MM SPC TIP 3.5MM FJ CRV

## (undated) DEVICE — SYRINGE MEDICAL 3ML CLEAR PLASTIC STANDARD NON CONTROL LUERLOCK TIP DISPOSABLE

## (undated) DEVICE — CONNECTOR TBNG OD5-7MM O2 END DISP